# Patient Record
Sex: FEMALE | Race: WHITE | Employment: OTHER | URBAN - METROPOLITAN AREA
[De-identification: names, ages, dates, MRNs, and addresses within clinical notes are randomized per-mention and may not be internally consistent; named-entity substitution may affect disease eponyms.]

---

## 2019-01-01 ENCOUNTER — RESULTS ONLY (OUTPATIENT)
Dept: ENDOSCOPY | Facility: CLINIC | Age: 59
End: 2019-01-01

## 2019-01-01 ENCOUNTER — APPOINTMENT (OUTPATIENT)
Dept: INTERVENTIONAL RADIOLOGY/VASCULAR | Facility: CLINIC | Age: 59
End: 2019-01-01
Attending: RADIOLOGY
Payer: COMMERCIAL

## 2019-01-01 ENCOUNTER — APPOINTMENT (OUTPATIENT)
Dept: GENERAL RADIOLOGY | Facility: CLINIC | Age: 59
End: 2019-01-01
Attending: INTERNAL MEDICINE
Payer: COMMERCIAL

## 2019-01-01 ENCOUNTER — APPOINTMENT (OUTPATIENT)
Dept: INTERVENTIONAL RADIOLOGY/VASCULAR | Facility: CLINIC | Age: 59
End: 2019-01-01
Attending: INTERNAL MEDICINE
Payer: COMMERCIAL

## 2019-01-01 ENCOUNTER — TRANSFERRED RECORDS (OUTPATIENT)
Dept: HEALTH INFORMATION MANAGEMENT | Facility: CLINIC | Age: 59
End: 2019-01-01

## 2019-01-01 ENCOUNTER — APPOINTMENT (OUTPATIENT)
Dept: SPEECH THERAPY | Facility: CLINIC | Age: 59
End: 2019-01-01
Attending: INTERNAL MEDICINE
Payer: COMMERCIAL

## 2019-01-01 ENCOUNTER — APPOINTMENT (OUTPATIENT)
Dept: OCCUPATIONAL THERAPY | Facility: CLINIC | Age: 59
End: 2019-01-01
Attending: INTERNAL MEDICINE
Payer: COMMERCIAL

## 2019-01-01 ENCOUNTER — APPOINTMENT (OUTPATIENT)
Dept: GENERAL RADIOLOGY | Facility: CLINIC | Age: 59
End: 2019-01-01
Attending: EMERGENCY MEDICINE
Payer: COMMERCIAL

## 2019-01-01 ENCOUNTER — APPOINTMENT (OUTPATIENT)
Dept: GENERAL RADIOLOGY | Facility: CLINIC | Age: 59
End: 2019-01-01
Attending: HOSPITALIST
Payer: COMMERCIAL

## 2019-01-01 ENCOUNTER — HOSPITAL ENCOUNTER (INPATIENT)
Facility: CLINIC | Age: 59
LOS: 20 days | End: 2020-01-09
Attending: INTERNAL MEDICINE | Admitting: INTERNAL MEDICINE
Payer: COMMERCIAL

## 2019-01-01 ENCOUNTER — APPOINTMENT (OUTPATIENT)
Dept: CARDIOLOGY | Facility: CLINIC | Age: 59
End: 2019-01-01
Attending: INTERNAL MEDICINE
Payer: COMMERCIAL

## 2019-01-01 ENCOUNTER — ANESTHESIA (OUTPATIENT)
Dept: INTENSIVE CARE | Facility: CLINIC | Age: 59
End: 2019-01-01
Payer: COMMERCIAL

## 2019-01-01 ENCOUNTER — ANESTHESIA EVENT (OUTPATIENT)
Dept: INTENSIVE CARE | Facility: CLINIC | Age: 59
End: 2019-01-01
Payer: COMMERCIAL

## 2019-01-01 ENCOUNTER — HOSPITAL ENCOUNTER (INPATIENT)
Facility: CLINIC | Age: 59
LOS: 1 days | Discharge: ANOTHER HOSPITAL | End: 2019-12-20
Attending: EMERGENCY MEDICINE | Admitting: INTERNAL MEDICINE
Payer: COMMERCIAL

## 2019-01-01 ENCOUNTER — APPOINTMENT (OUTPATIENT)
Dept: PHYSICAL THERAPY | Facility: CLINIC | Age: 59
End: 2019-01-01
Attending: INTERNAL MEDICINE
Payer: COMMERCIAL

## 2019-01-01 ENCOUNTER — APPOINTMENT (OUTPATIENT)
Dept: GENERAL RADIOLOGY | Facility: CLINIC | Age: 59
End: 2019-01-01
Attending: SURGERY
Payer: COMMERCIAL

## 2019-01-01 VITALS
TEMPERATURE: 97.5 F | WEIGHT: 91.49 LBS | RESPIRATION RATE: 18 BRPM | DIASTOLIC BLOOD PRESSURE: 90 MMHG | HEART RATE: 126 BPM | OXYGEN SATURATION: 99 % | SYSTOLIC BLOOD PRESSURE: 172 MMHG

## 2019-01-01 DIAGNOSIS — N28.9 RENAL INSUFFICIENCY: ICD-10-CM

## 2019-01-01 DIAGNOSIS — K26.4 GASTROINTESTINAL HEMORRHAGE ASSOCIATED WITH DUODENAL ULCER: ICD-10-CM

## 2019-01-01 DIAGNOSIS — I95.89 OTHER SPECIFIED HYPOTENSION: ICD-10-CM

## 2019-01-01 LAB
ABO + RH BLD: NORMAL
ALBUMIN SERPL-MCNC: 1 G/DL (ref 3.4–5)
ALBUMIN SERPL-MCNC: 1.2 G/DL (ref 3.4–5)
ALBUMIN SERPL-MCNC: 1.8 G/DL (ref 3.4–5)
ALBUMIN SERPL-MCNC: 1.8 G/DL (ref 3.4–5)
ALBUMIN SERPL-MCNC: 2 G/DL (ref 3.4–5)
ALBUMIN SERPL-MCNC: 2.3 G/DL (ref 3.4–5)
ALP SERPL-CCNC: 136 U/L (ref 40–150)
ALP SERPL-CCNC: 42 U/L (ref 40–150)
ALP SERPL-CCNC: 43 U/L (ref 40–150)
ALP SERPL-CCNC: 45 U/L (ref 40–150)
ALP SERPL-CCNC: 62 U/L (ref 40–150)
ALP SERPL-CCNC: 81 U/L (ref 40–150)
ALT SERPL W P-5'-P-CCNC: 12 U/L (ref 0–50)
ALT SERPL W P-5'-P-CCNC: 12 U/L (ref 0–50)
ALT SERPL W P-5'-P-CCNC: 17 U/L (ref 0–50)
ALT SERPL W P-5'-P-CCNC: 22 U/L (ref 0–50)
ALT SERPL W P-5'-P-CCNC: 23 U/L (ref 0–50)
ALT SERPL W P-5'-P-CCNC: 24 U/L (ref 0–50)
ANION GAP SERPL CALCULATED.3IONS-SCNC: 10 MMOL/L (ref 3–14)
ANION GAP SERPL CALCULATED.3IONS-SCNC: 11 MMOL/L (ref 3–14)
ANION GAP SERPL CALCULATED.3IONS-SCNC: 11 MMOL/L (ref 3–14)
ANION GAP SERPL CALCULATED.3IONS-SCNC: 12 MMOL/L (ref 3–14)
ANION GAP SERPL CALCULATED.3IONS-SCNC: 13 MMOL/L (ref 3–14)
ANION GAP SERPL CALCULATED.3IONS-SCNC: 14 MMOL/L (ref 3–14)
ANION GAP SERPL CALCULATED.3IONS-SCNC: 8 MMOL/L (ref 3–14)
ANION GAP SERPL CALCULATED.3IONS-SCNC: 9 MMOL/L (ref 3–14)
APTT PPP: 25 SEC (ref 22–37)
APTT PPP: 36 SEC (ref 22–37)
APTT PPP: <20 SEC (ref 22–37)
AST SERPL W P-5'-P-CCNC: 12 U/L (ref 0–45)
AST SERPL W P-5'-P-CCNC: 18 U/L (ref 0–45)
AST SERPL W P-5'-P-CCNC: 22 U/L (ref 0–45)
AST SERPL W P-5'-P-CCNC: 28 U/L (ref 0–45)
AST SERPL W P-5'-P-CCNC: 33 U/L (ref 0–45)
AST SERPL W P-5'-P-CCNC: 39 U/L (ref 0–45)
BACTERIA SPEC CULT: NO GROWTH
BACTERIA SPEC CULT: NO GROWTH
BASE DEFICIT BLDA-SCNC: 11.2 MMOL/L
BASE DEFICIT BLDA-SCNC: 16.6 MMOL/L
BASE DEFICIT BLDV-SCNC: 12.1 MMOL/L
BASE DEFICIT BLDV-SCNC: 12.2 MMOL/L
BASE DEFICIT BLDV-SCNC: 13 MMOL/L
BASE DEFICIT BLDV-SCNC: 13.3 MMOL/L
BASE DEFICIT BLDV-SCNC: 14.1 MMOL/L
BASE DEFICIT BLDV-SCNC: 16.1 MMOL/L
BASE DEFICIT BLDV-SCNC: 5.4 MMOL/L
BASE DEFICIT BLDV-SCNC: 9.4 MMOL/L
BASOPHILS # BLD AUTO: 0 10E9/L (ref 0–0.2)
BASOPHILS NFR BLD AUTO: 0 %
BASOPHILS NFR BLD AUTO: 0.1 %
BASOPHILS NFR BLD AUTO: 0.1 %
BILIRUB SERPL-MCNC: 0.2 MG/DL (ref 0.2–1.3)
BILIRUB SERPL-MCNC: 0.2 MG/DL (ref 0.2–1.3)
BILIRUB SERPL-MCNC: 0.4 MG/DL (ref 0.2–1.3)
BILIRUB SERPL-MCNC: 0.4 MG/DL (ref 0.2–1.3)
BILIRUB SERPL-MCNC: 0.5 MG/DL (ref 0.2–1.3)
BILIRUB SERPL-MCNC: 0.8 MG/DL (ref 0.2–1.3)
BLD GP AB SCN SERPL QL: NORMAL
BLD PROD TYP BPU: NORMAL
BLD UNIT ID BPU: 0
BLOOD BANK CMNT PATIENT-IMP: NORMAL
BLOOD PRODUCT CODE: NORMAL
BPU ID: NORMAL
BUN SERPL-MCNC: 100 MG/DL (ref 7–30)
BUN SERPL-MCNC: 100 MG/DL (ref 7–30)
BUN SERPL-MCNC: 101 MG/DL (ref 7–30)
BUN SERPL-MCNC: 104 MG/DL (ref 7–30)
BUN SERPL-MCNC: 105 MG/DL (ref 7–30)
BUN SERPL-MCNC: 111 MG/DL (ref 7–30)
BUN SERPL-MCNC: 117 MG/DL (ref 7–30)
BUN SERPL-MCNC: 130 MG/DL (ref 7–30)
BUN SERPL-MCNC: 147 MG/DL (ref 7–30)
BUN SERPL-MCNC: 92 MG/DL (ref 7–30)
BUN SERPL-MCNC: 94 MG/DL (ref 7–30)
BUN SERPL-MCNC: 95 MG/DL (ref 7–30)
BUN SERPL-MCNC: 98 MG/DL (ref 7–30)
BUN SERPL-MCNC: 98 MG/DL (ref 7–30)
BUN SERPL-MCNC: 99 MG/DL (ref 7–30)
CA-I BLD-MCNC: 4.1 MG/DL (ref 4.4–5.2)
CA-I BLD-MCNC: 4.3 MG/DL (ref 4.4–5.2)
CA-I BLD-MCNC: 4.4 MG/DL (ref 4.4–5.2)
CA-I BLD-MCNC: 4.8 MG/DL (ref 4.4–5.2)
CA-I SERPL ISE-MCNC: 3.9 MG/DL (ref 4.4–5.2)
CALCIUM SERPL-MCNC: 6.3 MG/DL (ref 8.5–10.1)
CALCIUM SERPL-MCNC: 6.3 MG/DL (ref 8.5–10.1)
CALCIUM SERPL-MCNC: 6.5 MG/DL (ref 8.5–10.1)
CALCIUM SERPL-MCNC: 6.5 MG/DL (ref 8.5–10.1)
CALCIUM SERPL-MCNC: 6.6 MG/DL (ref 8.5–10.1)
CALCIUM SERPL-MCNC: 6.6 MG/DL (ref 8.5–10.1)
CALCIUM SERPL-MCNC: 6.8 MG/DL (ref 8.5–10.1)
CALCIUM SERPL-MCNC: 6.9 MG/DL (ref 8.5–10.1)
CALCIUM SERPL-MCNC: 7.2 MG/DL (ref 8.5–10.1)
CALCIUM SERPL-MCNC: 7.3 MG/DL (ref 8.5–10.1)
CALCIUM SERPL-MCNC: 7.5 MG/DL (ref 8.5–10.1)
CALCIUM SERPL-MCNC: 8.1 MG/DL (ref 8.5–10.1)
CALCIUM SERPL-MCNC: 8.6 MG/DL (ref 8.5–10.1)
CHLORIDE SERPL-SCNC: 112 MMOL/L (ref 94–109)
CHLORIDE SERPL-SCNC: 112 MMOL/L (ref 94–109)
CHLORIDE SERPL-SCNC: 113 MMOL/L (ref 94–109)
CHLORIDE SERPL-SCNC: 115 MMOL/L (ref 94–109)
CHLORIDE SERPL-SCNC: 116 MMOL/L (ref 94–109)
CHLORIDE SERPL-SCNC: 116 MMOL/L (ref 94–109)
CHLORIDE SERPL-SCNC: 117 MMOL/L (ref 94–109)
CHLORIDE SERPL-SCNC: 120 MMOL/L (ref 94–109)
CHLORIDE SERPL-SCNC: 120 MMOL/L (ref 94–109)
CHLORIDE SERPL-SCNC: 121 MMOL/L (ref 94–109)
CHLORIDE SERPL-SCNC: 121 MMOL/L (ref 94–109)
CHLORIDE SERPL-SCNC: 122 MMOL/L (ref 94–109)
CHLORIDE SERPL-SCNC: 123 MMOL/L (ref 94–109)
CHLORIDE SERPL-SCNC: 123 MMOL/L (ref 94–109)
CHLORIDE SERPL-SCNC: 125 MMOL/L (ref 94–109)
CO2 SERPL-SCNC: 11 MMOL/L (ref 20–32)
CO2 SERPL-SCNC: 12 MMOL/L (ref 20–32)
CO2 SERPL-SCNC: 12 MMOL/L (ref 20–32)
CO2 SERPL-SCNC: 13 MMOL/L (ref 20–32)
CO2 SERPL-SCNC: 14 MMOL/L (ref 20–32)
CO2 SERPL-SCNC: 14 MMOL/L (ref 20–32)
CO2 SERPL-SCNC: 15 MMOL/L (ref 20–32)
CO2 SERPL-SCNC: 15 MMOL/L (ref 20–32)
CO2 SERPL-SCNC: 16 MMOL/L (ref 20–32)
CO2 SERPL-SCNC: 16 MMOL/L (ref 20–32)
CO2 SERPL-SCNC: 17 MMOL/L (ref 20–32)
CO2 SERPL-SCNC: 18 MMOL/L (ref 20–32)
CO2 SERPL-SCNC: 18 MMOL/L (ref 20–32)
CREAT SERPL-MCNC: 2.17 MG/DL (ref 0.52–1.04)
CREAT SERPL-MCNC: 2.26 MG/DL (ref 0.52–1.04)
CREAT SERPL-MCNC: 2.49 MG/DL (ref 0.52–1.04)
CREAT SERPL-MCNC: 2.6 MG/DL (ref 0.52–1.04)
CREAT SERPL-MCNC: 2.62 MG/DL (ref 0.52–1.04)
CREAT SERPL-MCNC: 2.63 MG/DL (ref 0.52–1.04)
CREAT SERPL-MCNC: 2.72 MG/DL (ref 0.52–1.04)
CREAT SERPL-MCNC: 2.77 MG/DL (ref 0.52–1.04)
CREAT SERPL-MCNC: 2.95 MG/DL (ref 0.52–1.04)
CREAT SERPL-MCNC: 2.97 MG/DL (ref 0.52–1.04)
CREAT SERPL-MCNC: 3.1 MG/DL (ref 0.52–1.04)
CREAT SERPL-MCNC: 3.26 MG/DL (ref 0.52–1.04)
CREAT SERPL-MCNC: 3.27 MG/DL (ref 0.52–1.04)
CREAT SERPL-MCNC: 3.29 MG/DL (ref 0.52–1.04)
CREAT SERPL-MCNC: 3.36 MG/DL (ref 0.52–1.04)
CREAT SERPL-MCNC: 3.39 MG/DL (ref 0.52–1.04)
CREAT SERPL-MCNC: 3.52 MG/DL (ref 0.52–1.04)
CREAT UR-MCNC: 35 MG/DL
DIFFERENTIAL METHOD BLD: ABNORMAL
EOSINOPHIL # BLD AUTO: 0 10E9/L (ref 0–0.7)
EOSINOPHIL # BLD AUTO: 0.2 10E9/L (ref 0–0.7)
EOSINOPHIL # BLD AUTO: 0.2 10E9/L (ref 0–0.7)
EOSINOPHIL NFR BLD AUTO: 0 %
EOSINOPHIL NFR BLD AUTO: 1.1 %
EOSINOPHIL NFR BLD AUTO: 1.3 %
ERYTHROCYTE [DISTWIDTH] IN BLOOD BY AUTOMATED COUNT: 14.5 % (ref 10–15)
ERYTHROCYTE [DISTWIDTH] IN BLOOD BY AUTOMATED COUNT: 14.5 % (ref 10–15)
ERYTHROCYTE [DISTWIDTH] IN BLOOD BY AUTOMATED COUNT: 14.6 % (ref 10–15)
ERYTHROCYTE [DISTWIDTH] IN BLOOD BY AUTOMATED COUNT: 14.8 % (ref 10–15)
ERYTHROCYTE [DISTWIDTH] IN BLOOD BY AUTOMATED COUNT: 14.9 % (ref 10–15)
ERYTHROCYTE [DISTWIDTH] IN BLOOD BY AUTOMATED COUNT: 15 % (ref 10–15)
ERYTHROCYTE [DISTWIDTH] IN BLOOD BY AUTOMATED COUNT: 15 % (ref 10–15)
ERYTHROCYTE [DISTWIDTH] IN BLOOD BY AUTOMATED COUNT: 15.1 % (ref 10–15)
ERYTHROCYTE [DISTWIDTH] IN BLOOD BY AUTOMATED COUNT: 15.3 % (ref 10–15)
ERYTHROCYTE [DISTWIDTH] IN BLOOD BY AUTOMATED COUNT: 15.4 % (ref 10–15)
ERYTHROCYTE [DISTWIDTH] IN BLOOD BY AUTOMATED COUNT: 15.4 % (ref 10–15)
ERYTHROCYTE [DISTWIDTH] IN BLOOD BY AUTOMATED COUNT: 15.5 % (ref 10–15)
ERYTHROCYTE [DISTWIDTH] IN BLOOD BY AUTOMATED COUNT: 15.6 % (ref 10–15)
ERYTHROCYTE [DISTWIDTH] IN BLOOD BY AUTOMATED COUNT: 15.8 % (ref 10–15)
ERYTHROCYTE [DISTWIDTH] IN BLOOD BY AUTOMATED COUNT: 16.1 % (ref 10–15)
ERYTHROCYTE [DISTWIDTH] IN BLOOD BY AUTOMATED COUNT: 16.3 % (ref 10–15)
ERYTHROCYTE [DISTWIDTH] IN BLOOD BY AUTOMATED COUNT: 17 % (ref 10–15)
ERYTHROCYTE [DISTWIDTH] IN BLOOD BY AUTOMATED COUNT: 20.3 % (ref 10–15)
ETHANOL SERPL-MCNC: <0.01 G/DL
FIBRINOGEN PPP-MCNC: 187 MG/DL (ref 200–420)
FIBRINOGEN PPP-MCNC: 252 MG/DL (ref 200–420)
FIBRINOGEN PPP-MCNC: 297 MG/DL (ref 200–420)
FIBRINOGEN PPP-MCNC: 69 MG/DL (ref 200–420)
FIBRINOGEN PPP-MCNC: 92 MG/DL (ref 200–420)
FRACT EXCRET NA UR+SERPL-RTO: 2 %
GFR SERPL CREATININE-BSD FRML MDRD: 13 ML/MIN/{1.73_M2}
GFR SERPL CREATININE-BSD FRML MDRD: 14 ML/MIN/{1.73_M2}
GFR SERPL CREATININE-BSD FRML MDRD: 14 ML/MIN/{1.73_M2}
GFR SERPL CREATININE-BSD FRML MDRD: 15 ML/MIN/{1.73_M2}
GFR SERPL CREATININE-BSD FRML MDRD: 16 ML/MIN/{1.73_M2}
GFR SERPL CREATININE-BSD FRML MDRD: 16 ML/MIN/{1.73_M2}
GFR SERPL CREATININE-BSD FRML MDRD: 17 ML/MIN/{1.73_M2}
GFR SERPL CREATININE-BSD FRML MDRD: 18 ML/MIN/{1.73_M2}
GFR SERPL CREATININE-BSD FRML MDRD: 18 ML/MIN/{1.73_M2}
GFR SERPL CREATININE-BSD FRML MDRD: 19 ML/MIN/{1.73_M2}
GFR SERPL CREATININE-BSD FRML MDRD: 20 ML/MIN/{1.73_M2}
GFR SERPL CREATININE-BSD FRML MDRD: 23 ML/MIN/{1.73_M2}
GFR SERPL CREATININE-BSD FRML MDRD: 24 ML/MIN/{1.73_M2}
GLUCOSE BLDC GLUCOMTR-MCNC: 100 MG/DL (ref 70–99)
GLUCOSE BLDC GLUCOMTR-MCNC: 101 MG/DL (ref 70–99)
GLUCOSE BLDC GLUCOMTR-MCNC: 102 MG/DL (ref 70–99)
GLUCOSE BLDC GLUCOMTR-MCNC: 102 MG/DL (ref 70–99)
GLUCOSE BLDC GLUCOMTR-MCNC: 104 MG/DL (ref 70–99)
GLUCOSE BLDC GLUCOMTR-MCNC: 106 MG/DL (ref 70–99)
GLUCOSE BLDC GLUCOMTR-MCNC: 107 MG/DL (ref 70–99)
GLUCOSE BLDC GLUCOMTR-MCNC: 107 MG/DL (ref 70–99)
GLUCOSE BLDC GLUCOMTR-MCNC: 110 MG/DL (ref 70–99)
GLUCOSE BLDC GLUCOMTR-MCNC: 111 MG/DL (ref 70–99)
GLUCOSE BLDC GLUCOMTR-MCNC: 113 MG/DL (ref 70–99)
GLUCOSE BLDC GLUCOMTR-MCNC: 114 MG/DL (ref 70–99)
GLUCOSE BLDC GLUCOMTR-MCNC: 122 MG/DL (ref 70–99)
GLUCOSE BLDC GLUCOMTR-MCNC: 133 MG/DL (ref 70–99)
GLUCOSE BLDC GLUCOMTR-MCNC: 134 MG/DL (ref 70–99)
GLUCOSE BLDC GLUCOMTR-MCNC: 138 MG/DL (ref 70–99)
GLUCOSE BLDC GLUCOMTR-MCNC: 145 MG/DL (ref 70–99)
GLUCOSE BLDC GLUCOMTR-MCNC: 153 MG/DL (ref 70–99)
GLUCOSE BLDC GLUCOMTR-MCNC: 62 MG/DL (ref 70–99)
GLUCOSE BLDC GLUCOMTR-MCNC: 73 MG/DL (ref 70–99)
GLUCOSE BLDC GLUCOMTR-MCNC: 74 MG/DL (ref 70–99)
GLUCOSE BLDC GLUCOMTR-MCNC: 74 MG/DL (ref 70–99)
GLUCOSE BLDC GLUCOMTR-MCNC: 76 MG/DL (ref 70–99)
GLUCOSE BLDC GLUCOMTR-MCNC: 78 MG/DL (ref 70–99)
GLUCOSE BLDC GLUCOMTR-MCNC: 80 MG/DL (ref 70–99)
GLUCOSE BLDC GLUCOMTR-MCNC: 81 MG/DL (ref 70–99)
GLUCOSE BLDC GLUCOMTR-MCNC: 81 MG/DL (ref 70–99)
GLUCOSE BLDC GLUCOMTR-MCNC: 83 MG/DL (ref 70–99)
GLUCOSE BLDC GLUCOMTR-MCNC: 86 MG/DL (ref 70–99)
GLUCOSE BLDC GLUCOMTR-MCNC: 88 MG/DL (ref 70–99)
GLUCOSE BLDC GLUCOMTR-MCNC: 89 MG/DL (ref 70–99)
GLUCOSE BLDC GLUCOMTR-MCNC: 90 MG/DL (ref 70–99)
GLUCOSE BLDC GLUCOMTR-MCNC: 91 MG/DL (ref 70–99)
GLUCOSE BLDC GLUCOMTR-MCNC: 92 MG/DL (ref 70–99)
GLUCOSE BLDC GLUCOMTR-MCNC: 93 MG/DL (ref 70–99)
GLUCOSE BLDC GLUCOMTR-MCNC: 94 MG/DL (ref 70–99)
GLUCOSE BLDC GLUCOMTR-MCNC: 95 MG/DL (ref 70–99)
GLUCOSE BLDC GLUCOMTR-MCNC: 96 MG/DL (ref 70–99)
GLUCOSE BLDC GLUCOMTR-MCNC: 98 MG/DL (ref 70–99)
GLUCOSE BLDC GLUCOMTR-MCNC: 98 MG/DL (ref 70–99)
GLUCOSE BLDC GLUCOMTR-MCNC: 99 MG/DL (ref 70–99)
GLUCOSE SERPL-MCNC: 105 MG/DL (ref 70–99)
GLUCOSE SERPL-MCNC: 106 MG/DL (ref 70–99)
GLUCOSE SERPL-MCNC: 106 MG/DL (ref 70–99)
GLUCOSE SERPL-MCNC: 107 MG/DL (ref 70–99)
GLUCOSE SERPL-MCNC: 108 MG/DL (ref 70–99)
GLUCOSE SERPL-MCNC: 111 MG/DL (ref 70–99)
GLUCOSE SERPL-MCNC: 112 MG/DL (ref 70–99)
GLUCOSE SERPL-MCNC: 114 MG/DL (ref 70–99)
GLUCOSE SERPL-MCNC: 122 MG/DL (ref 70–99)
GLUCOSE SERPL-MCNC: 172 MG/DL (ref 70–99)
GLUCOSE SERPL-MCNC: 216 MG/DL (ref 70–99)
GLUCOSE SERPL-MCNC: 253 MG/DL (ref 70–99)
GLUCOSE SERPL-MCNC: 83 MG/DL (ref 70–99)
GLUCOSE SERPL-MCNC: 90 MG/DL (ref 70–99)
GLUCOSE SERPL-MCNC: 91 MG/DL (ref 70–99)
GLUCOSE SERPL-MCNC: 99 MG/DL (ref 70–99)
GLUCOSE SERPL-MCNC: 99 MG/DL (ref 70–99)
H PYLORI AG STL QL IA: NEGATIVE
HBA1C MFR BLD: 5.3 % (ref 0–5.6)
HCO3 BLD-SCNC: 10 MMOL/L (ref 21–28)
HCO3 BLD-SCNC: 13 MMOL/L (ref 21–28)
HCO3 BLDV-SCNC: 12 MMOL/L (ref 21–28)
HCO3 BLDV-SCNC: 13 MMOL/L (ref 21–28)
HCO3 BLDV-SCNC: 13 MMOL/L (ref 21–28)
HCO3 BLDV-SCNC: 14 MMOL/L (ref 21–28)
HCO3 BLDV-SCNC: 16 MMOL/L (ref 21–28)
HCO3 BLDV-SCNC: 20 MMOL/L (ref 21–28)
HCT VFR BLD AUTO: 16.8 % (ref 35–47)
HCT VFR BLD AUTO: 19.8 % (ref 35–47)
HCT VFR BLD AUTO: 20.5 % (ref 35–47)
HCT VFR BLD AUTO: 20.8 % (ref 35–47)
HCT VFR BLD AUTO: 21.1 % (ref 35–47)
HCT VFR BLD AUTO: 21.3 % (ref 35–47)
HCT VFR BLD AUTO: 21.5 % (ref 35–47)
HCT VFR BLD AUTO: 24.8 % (ref 35–47)
HCT VFR BLD AUTO: 26.5 % (ref 35–47)
HCT VFR BLD AUTO: 28.1 % (ref 35–47)
HCT VFR BLD AUTO: 28.6 % (ref 35–47)
HCT VFR BLD AUTO: 28.9 % (ref 35–47)
HCT VFR BLD AUTO: 29.3 % (ref 35–47)
HCT VFR BLD AUTO: 29.7 % (ref 35–47)
HCT VFR BLD AUTO: 29.8 % (ref 35–47)
HCT VFR BLD AUTO: 30.2 % (ref 35–47)
HCT VFR BLD AUTO: 30.5 % (ref 35–47)
HCT VFR BLD AUTO: 30.7 % (ref 35–47)
HCT VFR BLD AUTO: 31.5 % (ref 35–47)
HCT VFR BLD AUTO: 31.7 % (ref 35–47)
HCT VFR BLD AUTO: 33.3 % (ref 35–47)
HCT VFR BLD AUTO: 34.1 % (ref 35–47)
HCT VFR BLD AUTO: 36.1 % (ref 35–47)
HCT VFR BLD AUTO: 39.1 % (ref 35–47)
HGB BLD-MCNC: 10 G/DL (ref 11.7–15.7)
HGB BLD-MCNC: 10.1 G/DL (ref 11.7–15.7)
HGB BLD-MCNC: 10.2 G/DL (ref 11.7–15.7)
HGB BLD-MCNC: 10.3 G/DL (ref 11.7–15.7)
HGB BLD-MCNC: 10.3 G/DL (ref 11.7–15.7)
HGB BLD-MCNC: 10.4 G/DL (ref 11.7–15.7)
HGB BLD-MCNC: 10.5 G/DL (ref 11.7–15.7)
HGB BLD-MCNC: 10.5 G/DL (ref 11.7–15.7)
HGB BLD-MCNC: 10.6 G/DL (ref 11.7–15.7)
HGB BLD-MCNC: 10.6 G/DL (ref 11.7–15.7)
HGB BLD-MCNC: 10.7 G/DL (ref 11.7–15.7)
HGB BLD-MCNC: 10.7 G/DL (ref 11.7–15.7)
HGB BLD-MCNC: 11.1 G/DL (ref 11.7–15.7)
HGB BLD-MCNC: 11.8 G/DL (ref 11.7–15.7)
HGB BLD-MCNC: 12.7 G/DL (ref 11.7–15.7)
HGB BLD-MCNC: 13.5 G/DL (ref 11.7–15.7)
HGB BLD-MCNC: 13.5 G/DL (ref 11.7–15.7)
HGB BLD-MCNC: 5 G/DL (ref 11.7–15.7)
HGB BLD-MCNC: 6.8 G/DL (ref 11.7–15.7)
HGB BLD-MCNC: 7 G/DL (ref 11.7–15.7)
HGB BLD-MCNC: 7.2 G/DL (ref 11.7–15.7)
HGB BLD-MCNC: 7.3 G/DL (ref 11.7–15.7)
HGB BLD-MCNC: 7.4 G/DL (ref 11.7–15.7)
HGB BLD-MCNC: 7.5 G/DL (ref 11.7–15.7)
HGB BLD-MCNC: 8 G/DL (ref 11.7–15.7)
HGB BLD-MCNC: 8.1 G/DL (ref 11.7–15.7)
HGB BLD-MCNC: 8.4 G/DL (ref 11.7–15.7)
HGB BLD-MCNC: 8.4 G/DL (ref 11.7–15.7)
HGB BLD-MCNC: 8.8 G/DL (ref 11.7–15.7)
HGB BLD-MCNC: 8.9 G/DL (ref 11.7–15.7)
HGB BLD-MCNC: 9.2 G/DL (ref 11.7–15.7)
HGB BLD-MCNC: 9.3 G/DL (ref 11.7–15.7)
HGB BLD-MCNC: 9.4 G/DL (ref 11.7–15.7)
HGB BLD-MCNC: 9.5 G/DL (ref 11.7–15.7)
HGB BLD-MCNC: 9.8 G/DL (ref 11.7–15.7)
IMM GRANULOCYTES # BLD: 0 10E9/L (ref 0–0.4)
IMM GRANULOCYTES # BLD: 0.1 10E9/L (ref 0–0.4)
IMM GRANULOCYTES # BLD: 0.1 10E9/L (ref 0–0.4)
IMM GRANULOCYTES NFR BLD: 0.3 %
IMM GRANULOCYTES NFR BLD: 0.6 %
IMM GRANULOCYTES NFR BLD: 0.8 %
INR PPP: 1.26 (ref 0.86–1.14)
INR PPP: 1.26 (ref 0.86–1.14)
INR PPP: 1.28 (ref 0.86–1.14)
INR PPP: 1.3 (ref 0.86–1.14)
INR PPP: 1.31 (ref 0.86–1.14)
INR PPP: 1.32 (ref 0.86–1.14)
INR PPP: 1.34 (ref 0.86–1.14)
INR PPP: 1.36 (ref 0.86–1.14)
INR PPP: 1.67 (ref 0.86–1.14)
INR PPP: 1.74 (ref 0.86–1.14)
INTERPRETATION ECG - MUSE: NORMAL
INTERPRETATION ECG - MUSE: NORMAL
LACTATE BLD-SCNC: 0.7 MMOL/L (ref 0.7–2)
LACTATE BLD-SCNC: 0.9 MMOL/L (ref 0.7–2)
LACTATE BLD-SCNC: 1 MMOL/L (ref 0.7–2)
LACTATE BLD-SCNC: 1.1 MMOL/L (ref 0.7–2)
LACTATE BLD-SCNC: 1.3 MMOL/L (ref 0.7–2)
LACTATE BLD-SCNC: 2.1 MMOL/L (ref 0.7–2)
LACTATE BLD-SCNC: 4.2 MMOL/L (ref 0.7–2)
LACTATE SERPL-SCNC: 0.5 MMOL/L (ref 0.4–2)
LACTATE SERPL-SCNC: 0.6 MMOL/L (ref 0.4–2)
LACTATE SERPL-SCNC: NORMAL MMOL/L (ref 0.4–2)
LIPASE SERPL-CCNC: 209 U/L (ref 73–393)
LYMPHOCYTES # BLD AUTO: 1.2 10E9/L (ref 0.8–5.3)
LYMPHOCYTES # BLD AUTO: 1.3 10E9/L (ref 0.8–5.3)
LYMPHOCYTES # BLD AUTO: 1.9 10E9/L (ref 0.8–5.3)
LYMPHOCYTES NFR BLD AUTO: 20.5 %
LYMPHOCYTES NFR BLD AUTO: 8.3 %
LYMPHOCYTES NFR BLD AUTO: 8.8 %
Lab: NORMAL
Lab: NORMAL
MAGNESIUM SERPL-MCNC: 1.4 MG/DL (ref 1.6–2.3)
MAGNESIUM SERPL-MCNC: 1.4 MG/DL (ref 1.6–2.3)
MAGNESIUM SERPL-MCNC: 1.7 MG/DL (ref 1.6–2.3)
MAGNESIUM SERPL-MCNC: 1.7 MG/DL (ref 1.6–2.3)
MAGNESIUM SERPL-MCNC: 1.9 MG/DL (ref 1.6–2.3)
MAGNESIUM SERPL-MCNC: 2 MG/DL (ref 1.6–2.3)
MCH RBC QN AUTO: 19.8 PG (ref 26.5–33)
MCH RBC QN AUTO: 28.9 PG (ref 26.5–33)
MCH RBC QN AUTO: 28.9 PG (ref 26.5–33)
MCH RBC QN AUTO: 29 PG (ref 26.5–33)
MCH RBC QN AUTO: 29.3 PG (ref 26.5–33)
MCH RBC QN AUTO: 29.4 PG (ref 26.5–33)
MCH RBC QN AUTO: 29.6 PG (ref 26.5–33)
MCH RBC QN AUTO: 29.6 PG (ref 26.5–33)
MCH RBC QN AUTO: 29.8 PG (ref 26.5–33)
MCH RBC QN AUTO: 29.8 PG (ref 26.5–33)
MCH RBC QN AUTO: 30 PG (ref 26.5–33)
MCH RBC QN AUTO: 30.1 PG (ref 26.5–33)
MCH RBC QN AUTO: 30.2 PG (ref 26.5–33)
MCH RBC QN AUTO: 30.2 PG (ref 26.5–33)
MCH RBC QN AUTO: 30.3 PG (ref 26.5–33)
MCH RBC QN AUTO: 30.3 PG (ref 26.5–33)
MCH RBC QN AUTO: 30.4 PG (ref 26.5–33)
MCH RBC QN AUTO: 30.5 PG (ref 26.5–33)
MCH RBC QN AUTO: 30.7 PG (ref 26.5–33)
MCH RBC QN AUTO: 30.7 PG (ref 26.5–33)
MCH RBC QN AUTO: 30.8 PG (ref 26.5–33)
MCHC RBC AUTO-ENTMCNC: 29.8 G/DL (ref 31.5–36.5)
MCHC RBC AUTO-ENTMCNC: 32.7 G/DL (ref 31.5–36.5)
MCHC RBC AUTO-ENTMCNC: 32.8 G/DL (ref 31.5–36.5)
MCHC RBC AUTO-ENTMCNC: 32.9 G/DL (ref 31.5–36.5)
MCHC RBC AUTO-ENTMCNC: 33.3 G/DL (ref 31.5–36.5)
MCHC RBC AUTO-ENTMCNC: 33.5 G/DL (ref 31.5–36.5)
MCHC RBC AUTO-ENTMCNC: 33.6 G/DL (ref 31.5–36.5)
MCHC RBC AUTO-ENTMCNC: 33.7 G/DL (ref 31.5–36.5)
MCHC RBC AUTO-ENTMCNC: 33.9 G/DL (ref 31.5–36.5)
MCHC RBC AUTO-ENTMCNC: 34 G/DL (ref 31.5–36.5)
MCHC RBC AUTO-ENTMCNC: 34.1 G/DL (ref 31.5–36.5)
MCHC RBC AUTO-ENTMCNC: 34.1 G/DL (ref 31.5–36.5)
MCHC RBC AUTO-ENTMCNC: 34.3 G/DL (ref 31.5–36.5)
MCHC RBC AUTO-ENTMCNC: 34.4 G/DL (ref 31.5–36.5)
MCHC RBC AUTO-ENTMCNC: 34.5 G/DL (ref 31.5–36.5)
MCHC RBC AUTO-ENTMCNC: 34.5 G/DL (ref 31.5–36.5)
MCHC RBC AUTO-ENTMCNC: 34.6 G/DL (ref 31.5–36.5)
MCHC RBC AUTO-ENTMCNC: 34.6 G/DL (ref 31.5–36.5)
MCHC RBC AUTO-ENTMCNC: 34.7 G/DL (ref 31.5–36.5)
MCHC RBC AUTO-ENTMCNC: 34.8 G/DL (ref 31.5–36.5)
MCHC RBC AUTO-ENTMCNC: 34.9 G/DL (ref 31.5–36.5)
MCHC RBC AUTO-ENTMCNC: 34.9 G/DL (ref 31.5–36.5)
MCHC RBC AUTO-ENTMCNC: 35.1 G/DL (ref 31.5–36.5)
MCHC RBC AUTO-ENTMCNC: 35.2 G/DL (ref 31.5–36.5)
MCHC RBC AUTO-ENTMCNC: 35.2 G/DL (ref 31.5–36.5)
MCHC RBC AUTO-ENTMCNC: 35.4 G/DL (ref 31.5–36.5)
MCV RBC AUTO: 66 FL (ref 78–100)
MCV RBC AUTO: 85 FL (ref 78–100)
MCV RBC AUTO: 86 FL (ref 78–100)
MCV RBC AUTO: 87 FL (ref 78–100)
MCV RBC AUTO: 88 FL (ref 78–100)
MCV RBC AUTO: 89 FL (ref 78–100)
MCV RBC AUTO: 92 FL (ref 78–100)
MONOCYTES # BLD AUTO: 0.4 10E9/L (ref 0–1.3)
MONOCYTES # BLD AUTO: 1.1 10E9/L (ref 0–1.3)
MONOCYTES # BLD AUTO: 1.4 10E9/L (ref 0–1.3)
MONOCYTES NFR BLD AUTO: 4.8 %
MONOCYTES NFR BLD AUTO: 7.2 %
MONOCYTES NFR BLD AUTO: 9.4 %
MRSA DNA SPEC QL NAA+PROBE: NEGATIVE
MRSA DNA SPEC QL NAA+PROBE: NEGATIVE
NEUTROPHILS # BLD AUTO: 11.9 10E9/L (ref 1.6–8.3)
NEUTROPHILS # BLD AUTO: 12.3 10E9/L (ref 1.6–8.3)
NEUTROPHILS # BLD AUTO: 6.7 10E9/L (ref 1.6–8.3)
NEUTROPHILS NFR BLD AUTO: 72.4 %
NEUTROPHILS NFR BLD AUTO: 80 %
NEUTROPHILS NFR BLD AUTO: 82.3 %
NRBC # BLD AUTO: 0 10*3/UL
NRBC # BLD AUTO: 0 10*3/UL
NRBC # BLD AUTO: 0.1 10*3/UL
NRBC BLD AUTO-RTO: 0 /100
NRBC BLD AUTO-RTO: 0 /100
NRBC BLD AUTO-RTO: 2 /100
NUM BPU REQUESTED: 1
NUM BPU REQUESTED: 1
NUM BPU REQUESTED: 12
NUM BPU REQUESTED: 2
NUM BPU REQUESTED: 4
NUM BPU REQUESTED: 4
NUM BPU REQUESTED: 8
O2/TOTAL GAS SETTING VFR VENT: 30 %
O2/TOTAL GAS SETTING VFR VENT: ABNORMAL %
OXYHGB MFR BLD: 95 % (ref 92–100)
OXYHGB MFR BLD: 99 % (ref 92–100)
OXYHGB MFR BLDV: 52 %
OXYHGB MFR BLDV: 62 %
OXYHGB MFR BLDV: 67 %
OXYHGB MFR BLDV: 71 %
OXYHGB MFR BLDV: 71 %
OXYHGB MFR BLDV: 73 %
OXYHGB MFR BLDV: 73 %
OXYHGB MFR BLDV: 79 %
PCO2 BLD: 24 MM HG (ref 35–45)
PCO2 BLD: 24 MM HG (ref 35–45)
PCO2 BLDV: 26 MM HG (ref 40–50)
PCO2 BLDV: 27 MM HG (ref 40–50)
PCO2 BLDV: 28 MM HG (ref 40–50)
PCO2 BLDV: 30 MM HG (ref 40–50)
PCO2 BLDV: 31 MM HG (ref 40–50)
PCO2 BLDV: 33 MM HG (ref 40–50)
PCO2 BLDV: 35 MM HG (ref 40–50)
PCO2 BLDV: 42 MM HG (ref 40–50)
PH BLD: 7.21 PH (ref 7.35–7.45)
PH BLD: 7.34 PH (ref 7.35–7.45)
PH BLDV: 7.1 PH (ref 7.32–7.43)
PH BLDV: 7.22 PH (ref 7.32–7.43)
PH BLDV: 7.26 PH (ref 7.32–7.43)
PH BLDV: 7.27 PH (ref 7.32–7.43)
PH BLDV: 7.28 PH (ref 7.32–7.43)
PH BLDV: 7.3 PH (ref 7.32–7.43)
PH BLDV: 7.3 PH (ref 7.32–7.43)
PH BLDV: 7.35 PH (ref 7.32–7.43)
PHOSPHATE SERPL-MCNC: 4 MG/DL (ref 2.5–4.5)
PHOSPHATE SERPL-MCNC: 4 MG/DL (ref 2.5–4.5)
PHOSPHATE SERPL-MCNC: 4.8 MG/DL (ref 2.5–4.5)
PHOSPHATE SERPL-MCNC: 5.8 MG/DL (ref 2.5–4.5)
PHOSPHATE SERPL-MCNC: 5.8 MG/DL (ref 2.5–4.5)
PHOSPHATE SERPL-MCNC: 6 MG/DL (ref 2.5–4.5)
PHOSPHATE SERPL-MCNC: 7.8 MG/DL (ref 2.5–4.5)
PLATELET # BLD AUTO: 110 10E9/L (ref 150–450)
PLATELET # BLD AUTO: 117 10E9/L (ref 150–450)
PLATELET # BLD AUTO: 118 10E9/L (ref 150–450)
PLATELET # BLD AUTO: 119 10E9/L (ref 150–450)
PLATELET # BLD AUTO: 120 10E9/L (ref 150–450)
PLATELET # BLD AUTO: 123 10E9/L (ref 150–450)
PLATELET # BLD AUTO: 123 10E9/L (ref 150–450)
PLATELET # BLD AUTO: 124 10E9/L (ref 150–450)
PLATELET # BLD AUTO: 143 10E9/L (ref 150–450)
PLATELET # BLD AUTO: 144 10E9/L (ref 150–450)
PLATELET # BLD AUTO: 170 10E9/L (ref 150–450)
PLATELET # BLD AUTO: 171 10E9/L (ref 150–450)
PLATELET # BLD AUTO: 182 10E9/L (ref 150–450)
PLATELET # BLD AUTO: 201 10E9/L (ref 150–450)
PLATELET # BLD AUTO: 202 10E9/L (ref 150–450)
PLATELET # BLD AUTO: 219 10E9/L (ref 150–450)
PLATELET # BLD AUTO: 227 10E9/L (ref 150–450)
PLATELET # BLD AUTO: 283 10E9/L (ref 150–450)
PLATELET # BLD AUTO: 29 10E9/L (ref 150–450)
PLATELET # BLD AUTO: 53 10E9/L (ref 150–450)
PLATELET # BLD AUTO: 98 10E9/L (ref 150–450)
PLATELET # BLD AUTO: 98 10E9/L (ref 150–450)
PO2 BLD: 227 MM HG (ref 80–105)
PO2 BLD: 72 MM HG (ref 80–105)
PO2 BLDV: 26 MM HG (ref 25–47)
PO2 BLDV: 32 MM HG (ref 25–47)
PO2 BLDV: 36 MM HG (ref 25–47)
PO2 BLDV: 37 MM HG (ref 25–47)
PO2 BLDV: 39 MM HG (ref 25–47)
PO2 BLDV: 39 MM HG (ref 25–47)
PO2 BLDV: 41 MM HG (ref 25–47)
PO2 BLDV: 52 MM HG (ref 25–47)
POTASSIUM SERPL-SCNC: 3.2 MMOL/L (ref 3.4–5.3)
POTASSIUM SERPL-SCNC: 3.3 MMOL/L (ref 3.4–5.3)
POTASSIUM SERPL-SCNC: 3.4 MMOL/L (ref 3.4–5.3)
POTASSIUM SERPL-SCNC: 3.6 MMOL/L (ref 3.4–5.3)
POTASSIUM SERPL-SCNC: 3.8 MMOL/L (ref 3.4–5.3)
POTASSIUM SERPL-SCNC: 3.9 MMOL/L (ref 3.4–5.3)
POTASSIUM SERPL-SCNC: 4 MMOL/L (ref 3.4–5.3)
POTASSIUM SERPL-SCNC: 4.3 MMOL/L (ref 3.4–5.3)
POTASSIUM SERPL-SCNC: 4.4 MMOL/L (ref 3.4–5.3)
POTASSIUM SERPL-SCNC: 4.5 MMOL/L (ref 3.4–5.3)
POTASSIUM SERPL-SCNC: 4.6 MMOL/L (ref 3.4–5.3)
POTASSIUM SERPL-SCNC: 4.6 MMOL/L (ref 3.4–5.3)
POTASSIUM SERPL-SCNC: 4.7 MMOL/L (ref 3.4–5.3)
POTASSIUM SERPL-SCNC: 4.9 MMOL/L (ref 3.4–5.3)
POTASSIUM SERPL-SCNC: 5.2 MMOL/L (ref 3.4–5.3)
POTASSIUM SERPL-SCNC: 5.3 MMOL/L (ref 3.4–5.3)
POTASSIUM SERPL-SCNC: 5.4 MMOL/L (ref 3.4–5.3)
PROCALCITONIN SERPL-MCNC: 1.58 NG/ML
PROCALCITONIN SERPL-MCNC: 1.66 NG/ML
PROT SERPL-MCNC: 3.2 G/DL (ref 6.8–8.8)
PROT SERPL-MCNC: 3.4 G/DL (ref 6.8–8.8)
PROT SERPL-MCNC: 3.9 G/DL (ref 6.8–8.8)
PROT SERPL-MCNC: 4.3 G/DL (ref 6.8–8.8)
PROT SERPL-MCNC: 4.5 G/DL (ref 6.8–8.8)
PROT SERPL-MCNC: 5.2 G/DL (ref 6.8–8.8)
RBC # BLD AUTO: 2.23 10E12/L (ref 3.8–5.2)
RBC # BLD AUTO: 2.3 10E12/L (ref 3.8–5.2)
RBC # BLD AUTO: 2.37 10E12/L (ref 3.8–5.2)
RBC # BLD AUTO: 2.41 10E12/L (ref 3.8–5.2)
RBC # BLD AUTO: 2.42 10E12/L (ref 3.8–5.2)
RBC # BLD AUTO: 2.46 10E12/L (ref 3.8–5.2)
RBC # BLD AUTO: 2.46 10E12/L (ref 3.8–5.2)
RBC # BLD AUTO: 2.53 10E12/L (ref 3.8–5.2)
RBC # BLD AUTO: 2.91 10E12/L (ref 3.8–5.2)
RBC # BLD AUTO: 2.97 10E12/L (ref 3.8–5.2)
RBC # BLD AUTO: 3.14 10E12/L (ref 3.8–5.2)
RBC # BLD AUTO: 3.18 10E12/L (ref 3.8–5.2)
RBC # BLD AUTO: 3.42 10E12/L (ref 3.8–5.2)
RBC # BLD AUTO: 3.46 10E12/L (ref 3.8–5.2)
RBC # BLD AUTO: 3.47 10E12/L (ref 3.8–5.2)
RBC # BLD AUTO: 3.48 10E12/L (ref 3.8–5.2)
RBC # BLD AUTO: 3.49 10E12/L (ref 3.8–5.2)
RBC # BLD AUTO: 3.56 10E12/L (ref 3.8–5.2)
RBC # BLD AUTO: 3.58 10E12/L (ref 3.8–5.2)
RBC # BLD AUTO: 3.59 10E12/L (ref 3.8–5.2)
RBC # BLD AUTO: 3.78 10E12/L (ref 3.8–5.2)
RBC # BLD AUTO: 3.91 10E12/L (ref 3.8–5.2)
RBC # BLD AUTO: 4.17 10E12/L (ref 3.8–5.2)
RBC # BLD AUTO: 4.42 10E12/L (ref 3.8–5.2)
SODIUM SERPL-SCNC: 136 MMOL/L (ref 133–144)
SODIUM SERPL-SCNC: 137 MMOL/L (ref 133–144)
SODIUM SERPL-SCNC: 138 MMOL/L (ref 133–144)
SODIUM SERPL-SCNC: 139 MMOL/L (ref 133–144)
SODIUM SERPL-SCNC: 142 MMOL/L (ref 133–144)
SODIUM SERPL-SCNC: 142 MMOL/L (ref 133–144)
SODIUM SERPL-SCNC: 144 MMOL/L (ref 133–144)
SODIUM SERPL-SCNC: 146 MMOL/L (ref 133–144)
SODIUM SERPL-SCNC: 147 MMOL/L (ref 133–144)
SODIUM SERPL-SCNC: 148 MMOL/L (ref 133–144)
SODIUM SERPL-SCNC: 149 MMOL/L (ref 133–144)
SODIUM SERPL-SCNC: 151 MMOL/L (ref 133–144)
SODIUM SERPL-SCNC: 151 MMOL/L (ref 133–144)
SODIUM UR-SCNC: 29 MMOL/L
SPECIMEN EXP DATE BLD: NORMAL
SPECIMEN SOURCE: NORMAL
TRANSFUSION STATUS PATIENT QL: NORMAL
TROPONIN I SERPL-MCNC: 0.04 UG/L (ref 0–0.04)
UPPER GI ENDOSCOPY: NORMAL
UPPER GI ENDOSCOPY: NORMAL
WBC # BLD AUTO: 11.3 10E9/L (ref 4–11)
WBC # BLD AUTO: 12.2 10E9/L (ref 4–11)
WBC # BLD AUTO: 14.9 10E9/L (ref 4–11)
WBC # BLD AUTO: 15 10E9/L (ref 4–11)
WBC # BLD AUTO: 15.2 10E9/L (ref 4–11)
WBC # BLD AUTO: 15.4 10E9/L (ref 4–11)
WBC # BLD AUTO: 15.5 10E9/L (ref 4–11)
WBC # BLD AUTO: 16.1 10E9/L (ref 4–11)
WBC # BLD AUTO: 16.1 10E9/L (ref 4–11)
WBC # BLD AUTO: 16.5 10E9/L (ref 4–11)
WBC # BLD AUTO: 17 10E9/L (ref 4–11)
WBC # BLD AUTO: 17.6 10E9/L (ref 4–11)
WBC # BLD AUTO: 17.7 10E9/L (ref 4–11)
WBC # BLD AUTO: 17.7 10E9/L (ref 4–11)
WBC # BLD AUTO: 19.2 10E9/L (ref 4–11)
WBC # BLD AUTO: 20 10E9/L (ref 4–11)
WBC # BLD AUTO: 20 10E9/L (ref 4–11)
WBC # BLD AUTO: 20.5 10E9/L (ref 4–11)
WBC # BLD AUTO: 21.3 10E9/L (ref 4–11)
WBC # BLD AUTO: 21.4 10E9/L (ref 4–11)
WBC # BLD AUTO: 21.5 10E9/L (ref 4–11)
WBC # BLD AUTO: 23.3 10E9/L (ref 4–11)
WBC # BLD AUTO: 23.4 10E9/L (ref 4–11)
WBC # BLD AUTO: 23.7 10E9/L (ref 4–11)
WBC # BLD AUTO: 8.1 10E9/L (ref 4–11)
WBC # BLD AUTO: 9 10E9/L (ref 4–11)

## 2019-01-01 PROCEDURE — 25000128 H RX IP 250 OP 636: Performed by: HOSPITALIST

## 2019-01-01 PROCEDURE — 87040 BLOOD CULTURE FOR BACTERIA: CPT | Performed by: EMERGENCY MEDICINE

## 2019-01-01 PROCEDURE — 85027 COMPLETE CBC AUTOMATED: CPT | Performed by: INTERNAL MEDICINE

## 2019-01-01 PROCEDURE — C9113 INJ PANTOPRAZOLE SODIUM, VIA: HCPCS | Performed by: INTERNAL MEDICINE

## 2019-01-01 PROCEDURE — 25800030 ZZH RX IP 258 OP 636: Performed by: HOSPITALIST

## 2019-01-01 PROCEDURE — 85384 FIBRINOGEN ACTIVITY: CPT | Performed by: INTERNAL MEDICINE

## 2019-01-01 PROCEDURE — 82805 BLOOD GASES W/O2 SATURATION: CPT | Performed by: INTERNAL MEDICINE

## 2019-01-01 PROCEDURE — 3E043XZ INTRODUCTION OF VASOPRESSOR INTO CENTRAL VEIN, PERCUTANEOUS APPROACH: ICD-10-PCS | Performed by: INTERNAL MEDICINE

## 2019-01-01 PROCEDURE — C1769 GUIDE WIRE: HCPCS

## 2019-01-01 PROCEDURE — 86923 COMPATIBILITY TEST ELECTRIC: CPT | Performed by: EMERGENCY MEDICINE

## 2019-01-01 PROCEDURE — 25800030 ZZH RX IP 258 OP 636: Performed by: INTERNAL MEDICINE

## 2019-01-01 PROCEDURE — 84100 ASSAY OF PHOSPHORUS: CPT | Performed by: INTERNAL MEDICINE

## 2019-01-01 PROCEDURE — 25800030 ZZH RX IP 258 OP 636: Performed by: RADIOLOGY

## 2019-01-01 PROCEDURE — 86850 RBC ANTIBODY SCREEN: CPT | Performed by: NURSE PRACTITIONER

## 2019-01-01 PROCEDURE — 96374 THER/PROPH/DIAG INJ IV PUSH: CPT | Mod: 59

## 2019-01-01 PROCEDURE — 20000003 ZZH R&B ICU

## 2019-01-01 PROCEDURE — 85610 PROTHROMBIN TIME: CPT | Performed by: INTERNAL MEDICINE

## 2019-01-01 PROCEDURE — 25000128 H RX IP 250 OP 636: Performed by: INTERNAL MEDICINE

## 2019-01-01 PROCEDURE — 92526 ORAL FUNCTION THERAPY: CPT | Mod: GN | Performed by: SPEECH-LANGUAGE PATHOLOGIST

## 2019-01-01 PROCEDURE — 25000128 H RX IP 250 OP 636

## 2019-01-01 PROCEDURE — 97110 THERAPEUTIC EXERCISES: CPT | Mod: GP | Performed by: PHYSICAL THERAPIST

## 2019-01-01 PROCEDURE — 40000008 ZZH STATISTIC AIRWAY CARE

## 2019-01-01 PROCEDURE — C9113 INJ PANTOPRAZOLE SODIUM, VIA: HCPCS | Performed by: HOSPITALIST

## 2019-01-01 PROCEDURE — 94002 VENT MGMT INPAT INIT DAY: CPT

## 2019-01-01 PROCEDURE — 99233 SBSQ HOSP IP/OBS HIGH 50: CPT | Performed by: INTERNAL MEDICINE

## 2019-01-01 PROCEDURE — 87641 MR-STAPH DNA AMP PROBE: CPT | Performed by: INTERNAL MEDICINE

## 2019-01-01 PROCEDURE — 99291 CRITICAL CARE FIRST HOUR: CPT | Performed by: INTERNAL MEDICINE

## 2019-01-01 PROCEDURE — 27210746 ZZH CATH CR16

## 2019-01-01 PROCEDURE — 00000146 ZZHCL STATISTIC GLUCOSE BY METER IP

## 2019-01-01 PROCEDURE — 93325 DOPPLER ECHO COLOR FLOW MAPG: CPT | Mod: 26 | Performed by: INTERNAL MEDICINE

## 2019-01-01 PROCEDURE — 25000125 ZZHC RX 250: Performed by: INTERNAL MEDICINE

## 2019-01-01 PROCEDURE — 86900 BLOOD TYPING SEROLOGIC ABO: CPT | Performed by: INTERNAL MEDICINE

## 2019-01-01 PROCEDURE — 83605 ASSAY OF LACTIC ACID: CPT | Performed by: INTERNAL MEDICINE

## 2019-01-01 PROCEDURE — 36246 INS CATH ABD/L-EXT ART 2ND: CPT

## 2019-01-01 PROCEDURE — 36430 TRANSFUSION BLD/BLD COMPNT: CPT

## 2019-01-01 PROCEDURE — 40000275 ZZH STATISTIC RCP TIME EA 10 MIN

## 2019-01-01 PROCEDURE — 87070 CULTURE OTHR SPECIMN AEROBIC: CPT | Performed by: INTERNAL MEDICINE

## 2019-01-01 PROCEDURE — 40000344 ZZHCL STATISTIC THAWING COMPONENT: Performed by: INTERNAL MEDICINE

## 2019-01-01 PROCEDURE — 94003 VENT MGMT INPAT SUBQ DAY: CPT

## 2019-01-01 PROCEDURE — 85027 COMPLETE CBC AUTOMATED: CPT | Performed by: HOSPITALIST

## 2019-01-01 PROCEDURE — 85018 HEMOGLOBIN: CPT | Performed by: INTERNAL MEDICINE

## 2019-01-01 PROCEDURE — 97530 THERAPEUTIC ACTIVITIES: CPT | Mod: GP | Performed by: PHYSICAL THERAPIST

## 2019-01-01 PROCEDURE — 75774 ARTERY X-RAY EACH VESSEL: CPT

## 2019-01-01 PROCEDURE — 80048 BASIC METABOLIC PNL TOTAL CA: CPT | Performed by: INTERNAL MEDICINE

## 2019-01-01 PROCEDURE — 3E043XZ INTRODUCTION OF VASOPRESSOR INTO CENTRAL VEIN, PERCUTANEOUS APPROACH: ICD-10-PCS | Performed by: HOSPITALIST

## 2019-01-01 PROCEDURE — 5A1955Z RESPIRATORY VENTILATION, GREATER THAN 96 CONSECUTIVE HOURS: ICD-10-PCS | Performed by: HOSPITALIST

## 2019-01-01 PROCEDURE — 86850 RBC ANTIBODY SCREEN: CPT | Performed by: INTERNAL MEDICINE

## 2019-01-01 PROCEDURE — 40000104 ZZH STATISTIC MODERATE SEDATION < 10 MIN: Performed by: INTERNAL MEDICINE

## 2019-01-01 PROCEDURE — 43235 EGD DIAGNOSTIC BRUSH WASH: CPT | Performed by: INTERNAL MEDICINE

## 2019-01-01 PROCEDURE — 0DJ08ZZ INSPECTION OF UPPER INTESTINAL TRACT, VIA NATURAL OR ARTIFICIAL OPENING ENDOSCOPIC: ICD-10-PCS | Performed by: INTERNAL MEDICINE

## 2019-01-01 PROCEDURE — 93308 TTE F-UP OR LMTD: CPT

## 2019-01-01 PROCEDURE — 36415 COLL VENOUS BLD VENIPUNCTURE: CPT | Performed by: EMERGENCY MEDICINE

## 2019-01-01 PROCEDURE — 82805 BLOOD GASES W/O2 SATURATION: CPT | Performed by: SURGERY

## 2019-01-01 PROCEDURE — 0D968ZZ DRAINAGE OF STOMACH, VIA NATURAL OR ARTIFICIAL OPENING ENDOSCOPIC: ICD-10-PCS | Performed by: INTERNAL MEDICINE

## 2019-01-01 PROCEDURE — 40000986 XR CHEST PORT 1 VW

## 2019-01-01 PROCEDURE — 83690 ASSAY OF LIPASE: CPT | Performed by: EMERGENCY MEDICINE

## 2019-01-01 PROCEDURE — 85610 PROTHROMBIN TIME: CPT | Performed by: SURGERY

## 2019-01-01 PROCEDURE — 86923 COMPATIBILITY TEST ELECTRIC: CPT | Performed by: INTERNAL MEDICINE

## 2019-01-01 PROCEDURE — 27210889 ZZH ACCESSORY CR8

## 2019-01-01 PROCEDURE — 25800025 ZZH RX 258: Performed by: INTERNAL MEDICINE

## 2019-01-01 PROCEDURE — 85730 THROMBOPLASTIN TIME PARTIAL: CPT | Performed by: SURGERY

## 2019-01-01 PROCEDURE — 84132 ASSAY OF SERUM POTASSIUM: CPT | Performed by: INTERNAL MEDICINE

## 2019-01-01 PROCEDURE — 5A1935Z RESPIRATORY VENTILATION, LESS THAN 24 CONSECUTIVE HOURS: ICD-10-PCS | Performed by: HOSPITALIST

## 2019-01-01 PROCEDURE — 99231 SBSQ HOSP IP/OBS SF/LOW 25: CPT | Performed by: SURGERY

## 2019-01-01 PROCEDURE — 84145 PROCALCITONIN (PCT): CPT | Performed by: INTERNAL MEDICINE

## 2019-01-01 PROCEDURE — C1760 CLOSURE DEV, VASC: HCPCS

## 2019-01-01 PROCEDURE — 25000125 ZZHC RX 250: Performed by: SURGERY

## 2019-01-01 PROCEDURE — 83036 HEMOGLOBIN GLYCOSYLATED A1C: CPT | Performed by: INTERNAL MEDICINE

## 2019-01-01 PROCEDURE — 25000128 H RX IP 250 OP 636: Performed by: RADIOLOGY

## 2019-01-01 PROCEDURE — 87040 BLOOD CULTURE FOR BACTERIA: CPT | Performed by: INTERNAL MEDICINE

## 2019-01-01 PROCEDURE — P9059 PLASMA, FRZ BETWEEN 8-24HOUR: HCPCS | Performed by: INTERNAL MEDICINE

## 2019-01-01 PROCEDURE — 99291 CRITICAL CARE FIRST HOUR: CPT | Mod: 25

## 2019-01-01 PROCEDURE — 80048 BASIC METABOLIC PNL TOTAL CA: CPT | Performed by: HOSPITALIST

## 2019-01-01 PROCEDURE — P9037 PLATE PHERES LEUKOREDU IRRAD: HCPCS | Performed by: INTERNAL MEDICINE

## 2019-01-01 PROCEDURE — 99233 SBSQ HOSP IP/OBS HIGH 50: CPT | Performed by: HOSPITALIST

## 2019-01-01 PROCEDURE — 85384 FIBRINOGEN ACTIVITY: CPT | Performed by: SURGERY

## 2019-01-01 PROCEDURE — P9016 RBC LEUKOCYTES REDUCED: HCPCS | Performed by: INTERNAL MEDICINE

## 2019-01-01 PROCEDURE — 85027 COMPLETE CBC AUTOMATED: CPT | Performed by: SURGERY

## 2019-01-01 PROCEDURE — 84100 ASSAY OF PHOSPHORUS: CPT | Performed by: HOSPITALIST

## 2019-01-01 PROCEDURE — 99291 CRITICAL CARE FIRST HOUR: CPT | Mod: 24 | Performed by: INTERNAL MEDICINE

## 2019-01-01 PROCEDURE — 25000131 ZZH RX MED GY IP 250 OP 636 PS 637: Performed by: INTERNAL MEDICINE

## 2019-01-01 PROCEDURE — 83735 ASSAY OF MAGNESIUM: CPT | Performed by: HOSPITALIST

## 2019-01-01 PROCEDURE — C9113 INJ PANTOPRAZOLE SODIUM, VIA: HCPCS | Performed by: EMERGENCY MEDICINE

## 2019-01-01 PROCEDURE — 99221 1ST HOSP IP/OBS SF/LOW 40: CPT | Mod: 24 | Performed by: SURGERY

## 2019-01-01 PROCEDURE — 85610 PROTHROMBIN TIME: CPT | Performed by: NURSE PRACTITIONER

## 2019-01-01 PROCEDURE — 83605 ASSAY OF LACTIC ACID: CPT | Performed by: HOSPITALIST

## 2019-01-01 PROCEDURE — 27210432 ZZH NUTRITION PRODUCT RENAL BASIC LITER

## 2019-01-01 PROCEDURE — 40000256 ZZH STATISTIC CARDIOPULM RESUSCITATION

## 2019-01-01 PROCEDURE — 04L13DZ OCCLUSION OF CELIAC ARTERY WITH INTRALUMINAL DEVICE, PERCUTANEOUS APPROACH: ICD-10-PCS | Performed by: RADIOLOGY

## 2019-01-01 PROCEDURE — 84484 ASSAY OF TROPONIN QUANT: CPT | Performed by: EMERGENCY MEDICINE

## 2019-01-01 PROCEDURE — 85025 COMPLETE CBC W/AUTO DIFF WBC: CPT | Performed by: EMERGENCY MEDICINE

## 2019-01-01 PROCEDURE — 27210886 ZZH ACCESSORY CR5

## 2019-01-01 PROCEDURE — 12000000 ZZH R&B MED SURG/OB

## 2019-01-01 PROCEDURE — 83735 ASSAY OF MAGNESIUM: CPT | Performed by: INTERNAL MEDICINE

## 2019-01-01 PROCEDURE — 25000132 ZZH RX MED GY IP 250 OP 250 PS 637: Performed by: INTERNAL MEDICINE

## 2019-01-01 PROCEDURE — 31500 INSERT EMERGENCY AIRWAY: CPT | Performed by: SURGERY

## 2019-01-01 PROCEDURE — 87077 CULTURE AEROBIC IDENTIFY: CPT | Performed by: INTERNAL MEDICINE

## 2019-01-01 PROCEDURE — 92610 EVALUATE SWALLOWING FUNCTION: CPT | Mod: GN

## 2019-01-01 PROCEDURE — 27210808 ZZH SHEATH CR7

## 2019-01-01 PROCEDURE — 40000671 ZZH STATISTIC ANESTHESIA CASE

## 2019-01-01 PROCEDURE — 85014 HEMATOCRIT: CPT | Performed by: INTERNAL MEDICINE

## 2019-01-01 PROCEDURE — 25800030 ZZH RX IP 258 OP 636: Performed by: EMERGENCY MEDICINE

## 2019-01-01 PROCEDURE — 85027 COMPLETE CBC AUTOMATED: CPT | Performed by: RADIOLOGY

## 2019-01-01 PROCEDURE — 27210738 ZZH ACCESSORY CR2

## 2019-01-01 PROCEDURE — 36248 INS CATH ABD/L-EXT ART ADDL: CPT

## 2019-01-01 PROCEDURE — 86901 BLOOD TYPING SEROLOGIC RH(D): CPT | Performed by: NURSE PRACTITIONER

## 2019-01-01 PROCEDURE — 27211143 ZZHC CATH NEURO CR14

## 2019-01-01 PROCEDURE — 85025 COMPLETE CBC W/AUTO DIFF WBC: CPT | Performed by: INTERNAL MEDICINE

## 2019-01-01 PROCEDURE — 85018 HEMOGLOBIN: CPT | Performed by: HOSPITALIST

## 2019-01-01 PROCEDURE — 86901 BLOOD TYPING SEROLOGIC RH(D): CPT | Performed by: INTERNAL MEDICINE

## 2019-01-01 PROCEDURE — 82330 ASSAY OF CALCIUM: CPT | Performed by: INTERNAL MEDICINE

## 2019-01-01 PROCEDURE — 80053 COMPREHEN METABOLIC PANEL: CPT | Performed by: EMERGENCY MEDICINE

## 2019-01-01 PROCEDURE — 40000809 ZZH STATISTIC NO DOCUMENTATION TO SUPPORT CHARGE

## 2019-01-01 PROCEDURE — 25500064 ZZH RX 255 OP 636: Performed by: HOSPITALIST

## 2019-01-01 PROCEDURE — 40000986 XR ABDOMEN PORT 1 VW

## 2019-01-01 PROCEDURE — 25000125 ZZHC RX 250: Performed by: RADIOLOGY

## 2019-01-01 PROCEDURE — 80053 COMPREHEN METABOLIC PANEL: CPT | Performed by: NURSE PRACTITIONER

## 2019-01-01 PROCEDURE — 5A1935Z RESPIRATORY VENTILATION, LESS THAN 24 CONSECUTIVE HOURS: ICD-10-PCS | Performed by: INTERNAL MEDICINE

## 2019-01-01 PROCEDURE — 25500064 ZZH RX 255 OP 636: Performed by: RADIOLOGY

## 2019-01-01 PROCEDURE — 85730 THROMBOPLASTIN TIME PARTIAL: CPT | Performed by: INTERNAL MEDICINE

## 2019-01-01 PROCEDURE — 99292 CRITICAL CARE ADDL 30 MIN: CPT | Performed by: INTERNAL MEDICINE

## 2019-01-01 PROCEDURE — 87186 SC STD MICRODIL/AGAR DIL: CPT | Performed by: INTERNAL MEDICINE

## 2019-01-01 PROCEDURE — C1773 RET DEV, INSERTABLE: HCPCS

## 2019-01-01 PROCEDURE — 82570 ASSAY OF URINE CREATININE: CPT | Performed by: INTERNAL MEDICINE

## 2019-01-01 PROCEDURE — 75726 ARTERY X-RAYS ABDOMEN: CPT

## 2019-01-01 PROCEDURE — 85610 PROTHROMBIN TIME: CPT | Performed by: EMERGENCY MEDICINE

## 2019-01-01 PROCEDURE — 25000125 ZZHC RX 250

## 2019-01-01 PROCEDURE — 93308 TTE F-UP OR LMTD: CPT | Mod: 26 | Performed by: INTERNAL MEDICINE

## 2019-01-01 PROCEDURE — P9016 RBC LEUKOCYTES REDUCED: HCPCS | Performed by: EMERGENCY MEDICINE

## 2019-01-01 PROCEDURE — 27210804 ZZH SHEATH CR3

## 2019-01-01 PROCEDURE — 96365 THER/PROPH/DIAG IV INF INIT: CPT | Mod: 59

## 2019-01-01 PROCEDURE — 71045 X-RAY EXAM CHEST 1 VIEW: CPT

## 2019-01-01 PROCEDURE — 25000128 H RX IP 250 OP 636: Performed by: EMERGENCY MEDICINE

## 2019-01-01 PROCEDURE — 36247 INS CATH ABD/L-EXT ART 3RD: CPT

## 2019-01-01 PROCEDURE — 97165 OT EVAL LOW COMPLEX 30 MIN: CPT | Mod: GO | Performed by: OCCUPATIONAL THERAPIST

## 2019-01-01 PROCEDURE — 80053 COMPREHEN METABOLIC PANEL: CPT | Performed by: SURGERY

## 2019-01-01 PROCEDURE — 04L53DZ OCCLUSION OF SUPERIOR MESENTERIC ARTERY WITH INTRALUMINAL DEVICE, PERCUTANEOUS APPROACH: ICD-10-PCS | Performed by: RADIOLOGY

## 2019-01-01 PROCEDURE — 86900 BLOOD TYPING SEROLOGIC ABO: CPT | Performed by: NURSE PRACTITIONER

## 2019-01-01 PROCEDURE — P9012 CRYOPRECIPITATE EACH UNIT: HCPCS | Performed by: INTERNAL MEDICINE

## 2019-01-01 PROCEDURE — 36556 INSERT NON-TUNNEL CV CATH: CPT

## 2019-01-01 PROCEDURE — 25800030 ZZH RX IP 258 OP 636: Performed by: NURSE PRACTITIONER

## 2019-01-01 PROCEDURE — 27211078 ZZH GLUE EMBOLI CR28

## 2019-01-01 PROCEDURE — 83605 ASSAY OF LACTIC ACID: CPT | Performed by: EMERGENCY MEDICINE

## 2019-01-01 PROCEDURE — 93321 DOPPLER ECHO F-UP/LMTD STD: CPT | Mod: 26 | Performed by: INTERNAL MEDICINE

## 2019-01-01 PROCEDURE — 87338 HPYLORI STOOL AG IA: CPT | Performed by: INTERNAL MEDICINE

## 2019-01-01 PROCEDURE — 25000132 ZZH RX MED GY IP 250 OP 250 PS 637: Performed by: HOSPITALIST

## 2019-01-01 PROCEDURE — 96361 HYDRATE IV INFUSION ADD-ON: CPT

## 2019-01-01 PROCEDURE — 25000125 ZZHC RX 250: Performed by: HOSPITALIST

## 2019-01-01 PROCEDURE — 85027 COMPLETE CBC AUTOMATED: CPT | Performed by: NURSE PRACTITIONER

## 2019-01-01 PROCEDURE — 40000873 ZZH CANCELLED SURGERY UP TO 15 MINS: Performed by: INTERNAL MEDICINE

## 2019-01-01 PROCEDURE — 80320 DRUG SCREEN QUANTALCOHOLS: CPT | Performed by: EMERGENCY MEDICINE

## 2019-01-01 PROCEDURE — 84300 ASSAY OF URINE SODIUM: CPT | Performed by: INTERNAL MEDICINE

## 2019-01-01 PROCEDURE — 99292 CRITICAL CARE ADDL 30 MIN: CPT | Mod: 24 | Performed by: SURGERY

## 2019-01-01 PROCEDURE — 99291 CRITICAL CARE FIRST HOUR: CPT | Mod: 25 | Performed by: INTERNAL MEDICINE

## 2019-01-01 PROCEDURE — 80048 BASIC METABOLIC PNL TOTAL CA: CPT | Performed by: SURGERY

## 2019-01-01 PROCEDURE — 27210742 ZZH CATH CR1

## 2019-01-01 PROCEDURE — 87640 STAPH A DNA AMP PROBE: CPT | Performed by: INTERNAL MEDICINE

## 2019-01-01 PROCEDURE — 99231 SBSQ HOSP IP/OBS SF/LOW 25: CPT | Mod: 24 | Performed by: SURGERY

## 2019-01-01 PROCEDURE — 25000125 ZZHC RX 250: Performed by: NURSE ANESTHETIST, CERTIFIED REGISTERED

## 2019-01-01 PROCEDURE — 80053 COMPREHEN METABOLIC PANEL: CPT | Performed by: INTERNAL MEDICINE

## 2019-01-01 PROCEDURE — 93005 ELECTROCARDIOGRAM TRACING: CPT | Mod: 76

## 2019-01-01 PROCEDURE — 99291 CRITICAL CARE FIRST HOUR: CPT | Performed by: NURSE PRACTITIONER

## 2019-01-01 PROCEDURE — 27210805 ZZH SHEATH CR4

## 2019-01-01 PROCEDURE — 85018 HEMOGLOBIN: CPT | Performed by: SURGERY

## 2019-01-01 PROCEDURE — 83605 ASSAY OF LACTIC ACID: CPT | Performed by: SURGERY

## 2019-01-01 PROCEDURE — 04V33DZ RESTRICTION OF HEPATIC ARTERY WITH INTRALUMINAL DEVICE, PERCUTANEOUS APPROACH: ICD-10-PCS | Performed by: RADIOLOGY

## 2019-01-01 PROCEDURE — 93005 ELECTROCARDIOGRAM TRACING: CPT

## 2019-01-01 PROCEDURE — 31500 INSERT EMERGENCY AIRWAY: CPT

## 2019-01-01 PROCEDURE — 86901 BLOOD TYPING SEROLOGIC RH(D): CPT | Performed by: EMERGENCY MEDICINE

## 2019-01-01 PROCEDURE — 04V53DZ RESTRICTION OF SUPERIOR MESENTERIC ARTERY WITH INTRALUMINAL DEVICE, PERCUTANEOUS APPROACH: ICD-10-PCS | Performed by: RADIOLOGY

## 2019-01-01 PROCEDURE — 31500 INSERT EMERGENCY AIRWAY: CPT | Performed by: INTERNAL MEDICINE

## 2019-01-01 PROCEDURE — 36600 WITHDRAWAL OF ARTERIAL BLOOD: CPT

## 2019-01-01 PROCEDURE — 85049 AUTOMATED PLATELET COUNT: CPT | Performed by: INTERNAL MEDICINE

## 2019-01-01 PROCEDURE — 99153 MOD SED SAME PHYS/QHP EA: CPT

## 2019-01-01 PROCEDURE — 99232 SBSQ HOSP IP/OBS MODERATE 35: CPT | Performed by: SURGERY

## 2019-01-01 PROCEDURE — 99152 MOD SED SAME PHYS/QHP 5/>YRS: CPT

## 2019-01-01 PROCEDURE — 25000128 H RX IP 250 OP 636: Performed by: NURSE ANESTHETIST, CERTIFIED REGISTERED

## 2019-01-01 PROCEDURE — 74018 RADEX ABDOMEN 1 VIEW: CPT

## 2019-01-01 PROCEDURE — 86900 BLOOD TYPING SEROLOGIC ABO: CPT | Performed by: EMERGENCY MEDICINE

## 2019-01-01 PROCEDURE — 97161 PT EVAL LOW COMPLEX 20 MIN: CPT | Mod: GP | Performed by: PHYSICAL THERAPIST

## 2019-01-01 PROCEDURE — 97535 SELF CARE MNGMENT TRAINING: CPT | Mod: GO | Performed by: OCCUPATIONAL THERAPY ASSISTANT

## 2019-01-01 PROCEDURE — 86850 RBC ANTIBODY SCREEN: CPT | Performed by: EMERGENCY MEDICINE

## 2019-01-01 PROCEDURE — 40000281 ZZH STATISTIC TRANSPORT TIME EA 15 MIN

## 2019-01-01 RX ORDER — CALCIUM CHLORIDE 100 MG/ML
INJECTION INTRAVENOUS; INTRAVENTRICULAR
Status: DISCONTINUED
Start: 2019-01-01 | End: 2019-01-01 | Stop reason: HOSPADM

## 2019-01-01 RX ORDER — IOPAMIDOL 612 MG/ML
100 INJECTION, SOLUTION INTRAVASCULAR ONCE
Status: COMPLETED | OUTPATIENT
Start: 2019-01-01 | End: 2019-01-01

## 2019-01-01 RX ORDER — HYDROMORPHONE HYDROCHLORIDE 1 MG/ML
0.3 INJECTION, SOLUTION INTRAMUSCULAR; INTRAVENOUS; SUBCUTANEOUS ONCE
Status: COMPLETED | OUTPATIENT
Start: 2019-01-01 | End: 2019-01-01

## 2019-01-01 RX ORDER — ACETAMINOPHEN 650 MG/1
650 SUPPOSITORY RECTAL EVERY 4 HOURS PRN
Status: DISCONTINUED | OUTPATIENT
Start: 2019-01-01 | End: 2020-01-01

## 2019-01-01 RX ORDER — CHLORHEXIDINE GLUCONATE ORAL RINSE 1.2 MG/ML
15 SOLUTION DENTAL EVERY 12 HOURS
Status: DISCONTINUED | OUTPATIENT
Start: 2019-01-01 | End: 2019-01-01

## 2019-01-01 RX ORDER — SODIUM CHLORIDE 9 MG/ML
INJECTION, SOLUTION INTRAVENOUS CONTINUOUS
Status: DISCONTINUED | OUTPATIENT
Start: 2019-01-01 | End: 2019-01-01

## 2019-01-01 RX ORDER — PROCHLORPERAZINE MALEATE 5 MG
10 TABLET ORAL EVERY 6 HOURS PRN
Status: DISCONTINUED | OUTPATIENT
Start: 2019-01-01 | End: 2019-01-01 | Stop reason: HOSPADM

## 2019-01-01 RX ORDER — SODIUM CHLORIDE, SODIUM LACTATE, POTASSIUM CHLORIDE, CALCIUM CHLORIDE 600; 310; 30; 20 MG/100ML; MG/100ML; MG/100ML; MG/100ML
1000 INJECTION, SOLUTION INTRAVENOUS CONTINUOUS
Status: DISCONTINUED | OUTPATIENT
Start: 2019-01-01 | End: 2019-01-01

## 2019-01-01 RX ORDER — NALOXONE HYDROCHLORIDE 0.4 MG/ML
.1-.4 INJECTION, SOLUTION INTRAMUSCULAR; INTRAVENOUS; SUBCUTANEOUS
Status: DISCONTINUED | OUTPATIENT
Start: 2019-01-01 | End: 2019-01-01

## 2019-01-01 RX ORDER — NOREPINEPHRINE BITARTRATE 0.06 MG/ML
INJECTION, SOLUTION INTRAVENOUS
Status: COMPLETED
Start: 2019-01-01 | End: 2019-01-01

## 2019-01-01 RX ORDER — ACETAMINOPHEN 500 MG
500 TABLET ORAL EVERY 6 HOURS PRN
Status: DISCONTINUED | OUTPATIENT
Start: 2019-01-01 | End: 2019-01-01

## 2019-01-01 RX ORDER — NICOTINE POLACRILEX 4 MG
15-30 LOZENGE BUCCAL
Status: DISCONTINUED | OUTPATIENT
Start: 2019-01-01 | End: 2019-01-01

## 2019-01-01 RX ORDER — FLUMAZENIL 0.1 MG/ML
0.2 INJECTION, SOLUTION INTRAVENOUS
Status: DISCONTINUED | OUTPATIENT
Start: 2019-01-01 | End: 2019-01-01 | Stop reason: HOSPADM

## 2019-01-01 RX ORDER — FUROSEMIDE 10 MG/ML
40 INJECTION INTRAMUSCULAR; INTRAVENOUS ONCE
Status: COMPLETED | OUTPATIENT
Start: 2019-01-01 | End: 2019-01-01

## 2019-01-01 RX ORDER — FENTANYL CITRATE 50 UG/ML
INJECTION, SOLUTION INTRAMUSCULAR; INTRAVENOUS
Status: DISCONTINUED
Start: 2019-01-01 | End: 2019-01-01 | Stop reason: HOSPADM

## 2019-01-01 RX ORDER — FENTANYL CITRATE 50 UG/ML
25-50 INJECTION, SOLUTION INTRAMUSCULAR; INTRAVENOUS EVERY 5 MIN PRN
Status: DISCONTINUED | OUTPATIENT
Start: 2019-01-01 | End: 2019-01-01 | Stop reason: HOSPADM

## 2019-01-01 RX ORDER — PROCHLORPERAZINE 25 MG
25 SUPPOSITORY, RECTAL RECTAL EVERY 12 HOURS PRN
Status: DISCONTINUED | OUTPATIENT
Start: 2019-01-01 | End: 2019-01-01 | Stop reason: HOSPADM

## 2019-01-01 RX ORDER — LIDOCAINE 40 MG/G
CREAM TOPICAL
Status: DISCONTINUED | OUTPATIENT
Start: 2019-01-01 | End: 2020-01-01

## 2019-01-01 RX ORDER — DEXMEDETOMIDINE HYDROCHLORIDE 4 UG/ML
0.2-0.7 INJECTION, SOLUTION INTRAVENOUS CONTINUOUS
Status: DISCONTINUED | OUTPATIENT
Start: 2019-01-01 | End: 2020-01-01

## 2019-01-01 RX ORDER — ALBUTEROL SULFATE 0.83 MG/ML
2.5 SOLUTION RESPIRATORY (INHALATION)
Status: DISCONTINUED | OUTPATIENT
Start: 2019-01-01 | End: 2020-01-01 | Stop reason: HOSPADM

## 2019-01-01 RX ORDER — PROPOFOL 10 MG/ML
5-75 INJECTION, EMULSION INTRAVENOUS CONTINUOUS
Status: DISCONTINUED | OUTPATIENT
Start: 2019-01-01 | End: 2019-01-01

## 2019-01-01 RX ORDER — LIDOCAINE HYDROCHLORIDE 10 MG/ML
INJECTION, SOLUTION INFILTRATION; PERINEURAL
Status: DISCONTINUED
Start: 2019-01-01 | End: 2019-01-01 | Stop reason: HOSPADM

## 2019-01-01 RX ORDER — ONDANSETRON 2 MG/ML
4 INJECTION INTRAMUSCULAR; INTRAVENOUS EVERY 6 HOURS PRN
Status: DISCONTINUED | OUTPATIENT
Start: 2019-01-01 | End: 2019-01-01 | Stop reason: HOSPADM

## 2019-01-01 RX ORDER — CHLORHEXIDINE GLUCONATE ORAL RINSE 1.2 MG/ML
15 SOLUTION DENTAL EVERY 12 HOURS
Status: DISCONTINUED | OUTPATIENT
Start: 2019-01-01 | End: 2020-01-01

## 2019-01-01 RX ORDER — POTASSIUM CHLORIDE 29.8 MG/ML
20 INJECTION INTRAVENOUS
Status: DISCONTINUED | OUTPATIENT
Start: 2019-01-01 | End: 2020-01-01

## 2019-01-01 RX ORDER — LIDOCAINE 40 MG/G
CREAM TOPICAL
Status: DISCONTINUED | OUTPATIENT
Start: 2019-01-01 | End: 2019-01-01 | Stop reason: HOSPADM

## 2019-01-01 RX ORDER — HYDROMORPHONE HYDROCHLORIDE 1 MG/ML
0.2 INJECTION, SOLUTION INTRAMUSCULAR; INTRAVENOUS; SUBCUTANEOUS ONCE
Status: DISCONTINUED | OUTPATIENT
Start: 2019-01-01 | End: 2019-01-01 | Stop reason: HOSPADM

## 2019-01-01 RX ORDER — FENTANYL CITRATE 50 UG/ML
25 INJECTION, SOLUTION INTRAMUSCULAR; INTRAVENOUS EVERY 5 MIN PRN
Status: DISCONTINUED | OUTPATIENT
Start: 2019-01-01 | End: 2019-01-01

## 2019-01-01 RX ORDER — EPHEDRINE SULFATE 50 MG/ML
INJECTION, SOLUTION INTRAMUSCULAR; INTRAVENOUS; SUBCUTANEOUS PRN
Status: DISCONTINUED | OUTPATIENT
Start: 2019-01-01 | End: 2019-01-01

## 2019-01-01 RX ORDER — DEXTROSE MONOHYDRATE 50 MG/ML
INJECTION, SOLUTION INTRAVENOUS CONTINUOUS
Status: DISCONTINUED | OUTPATIENT
Start: 2019-01-01 | End: 2019-01-01

## 2019-01-01 RX ORDER — DEXTROSE MONOHYDRATE 25 G/50ML
25-50 INJECTION, SOLUTION INTRAVENOUS
Status: DISCONTINUED | OUTPATIENT
Start: 2019-01-01 | End: 2019-01-01

## 2019-01-01 RX ORDER — CALCIUM CHLORIDE 100 MG/ML
INJECTION INTRAVENOUS; INTRAVENTRICULAR
Status: COMPLETED
Start: 2019-01-01 | End: 2019-01-01

## 2019-01-01 RX ORDER — FENTANYL CITRATE 50 UG/ML
50 INJECTION, SOLUTION INTRAMUSCULAR; INTRAVENOUS
Status: DISCONTINUED | OUTPATIENT
Start: 2019-01-01 | End: 2019-01-01

## 2019-01-01 RX ORDER — SODIUM CHLORIDE 9 MG/ML
INJECTION, SOLUTION INTRAVENOUS ONCE
Status: DISCONTINUED | OUTPATIENT
Start: 2019-01-01 | End: 2019-01-01

## 2019-01-01 RX ORDER — POTASSIUM CHLORIDE 1500 MG/1
20-40 TABLET, EXTENDED RELEASE ORAL
Status: DISCONTINUED | OUTPATIENT
Start: 2019-01-01 | End: 2020-01-01

## 2019-01-01 RX ORDER — ALBUTEROL SULFATE 0.83 MG/ML
2.5 SOLUTION RESPIRATORY (INHALATION) EVERY 6 HOURS PRN
COMMUNITY
End: 2019-01-01

## 2019-01-01 RX ORDER — NALOXONE HYDROCHLORIDE 0.4 MG/ML
.1-.4 INJECTION, SOLUTION INTRAMUSCULAR; INTRAVENOUS; SUBCUTANEOUS
Status: DISCONTINUED | OUTPATIENT
Start: 2019-01-01 | End: 2019-01-01 | Stop reason: HOSPADM

## 2019-01-01 RX ORDER — HYDROMORPHONE HYDROCHLORIDE 1 MG/ML
INJECTION, SOLUTION INTRAMUSCULAR; INTRAVENOUS; SUBCUTANEOUS
Status: COMPLETED
Start: 2019-01-01 | End: 2019-01-01

## 2019-01-01 RX ORDER — SODIUM CHLORIDE 9 MG/ML
INJECTION, SOLUTION INTRAVENOUS CONTINUOUS
Status: DISCONTINUED | OUTPATIENT
Start: 2019-01-01 | End: 2019-01-01 | Stop reason: HOSPADM

## 2019-01-01 RX ORDER — NOREPINEPHRINE BITARTRATE 0.06 MG/ML
0.03-0.4 INJECTION, SOLUTION INTRAVENOUS CONTINUOUS
Status: DISCONTINUED | OUTPATIENT
Start: 2019-01-01 | End: 2019-01-01

## 2019-01-01 RX ORDER — METOCLOPRAMIDE HYDROCHLORIDE 5 MG/ML
5 INJECTION INTRAMUSCULAR; INTRAVENOUS ONCE
Status: COMPLETED | OUTPATIENT
Start: 2019-01-01 | End: 2019-01-01

## 2019-01-01 RX ORDER — ONDANSETRON 4 MG/1
4 TABLET, ORALLY DISINTEGRATING ORAL EVERY 6 HOURS PRN
Status: DISCONTINUED | OUTPATIENT
Start: 2019-01-01 | End: 2019-01-01 | Stop reason: HOSPADM

## 2019-01-01 RX ORDER — MAGNESIUM SULFATE HEPTAHYDRATE 40 MG/ML
2 INJECTION, SOLUTION INTRAVENOUS ONCE
Status: COMPLETED | OUTPATIENT
Start: 2019-01-01 | End: 2019-01-01

## 2019-01-01 RX ORDER — POTASSIUM CHLORIDE 1.5 G/1.58G
20-40 POWDER, FOR SOLUTION ORAL
Status: DISCONTINUED | OUTPATIENT
Start: 2019-01-01 | End: 2020-01-01

## 2019-01-01 RX ORDER — CALCIUM CHLORIDE 100 MG/ML
1 INJECTION INTRAVENOUS; INTRAVENTRICULAR ONCE
Status: COMPLETED | OUTPATIENT
Start: 2019-01-01 | End: 2019-01-01

## 2019-01-01 RX ORDER — MAGNESIUM SULFATE HEPTAHYDRATE 40 MG/ML
2 INJECTION, SOLUTION INTRAVENOUS ONCE
Status: DISCONTINUED | OUTPATIENT
Start: 2019-01-01 | End: 2019-01-01

## 2019-01-01 RX ORDER — POTASSIUM CHLORIDE 7.45 MG/ML
10 INJECTION INTRAVENOUS
Status: DISCONTINUED | OUTPATIENT
Start: 2019-01-01 | End: 2020-01-01

## 2019-01-01 RX ORDER — NITROGLYCERIN 5 MG/ML
VIAL (ML) INTRAVENOUS
Status: DISCONTINUED
Start: 2019-01-01 | End: 2019-01-01 | Stop reason: HOSPADM

## 2019-01-01 RX ORDER — HYDROMORPHONE HYDROCHLORIDE 1 MG/ML
INJECTION, SOLUTION INTRAMUSCULAR; INTRAVENOUS; SUBCUTANEOUS
Status: DISCONTINUED
Start: 2019-01-01 | End: 2019-01-01 | Stop reason: HOSPADM

## 2019-01-01 RX ORDER — DOXYCYCLINE HYCLATE 100 MG/1
TABLET, DELAYED RELEASE ORAL
COMMUNITY
End: 2019-01-01

## 2019-01-01 RX ORDER — NITROGLYCERIN 5 MG/ML
100-500 VIAL (ML) INTRAVENOUS EVERY 5 MIN PRN
Status: DISCONTINUED | OUTPATIENT
Start: 2019-01-01 | End: 2019-01-01 | Stop reason: HOSPADM

## 2019-01-01 RX ORDER — ACETAMINOPHEN 325 MG/1
650 TABLET ORAL EVERY 4 HOURS PRN
Status: DISCONTINUED | OUTPATIENT
Start: 2019-01-01 | End: 2020-01-01 | Stop reason: HOSPADM

## 2019-01-01 RX ORDER — POTASSIUM CL/LIDO/0.9 % NACL 10MEQ/0.1L
10 INTRAVENOUS SOLUTION, PIGGYBACK (ML) INTRAVENOUS
Status: DISCONTINUED | OUTPATIENT
Start: 2019-01-01 | End: 2020-01-01

## 2019-01-01 RX ORDER — HYDROMORPHONE HYDROCHLORIDE 1 MG/ML
0.2 INJECTION, SOLUTION INTRAMUSCULAR; INTRAVENOUS; SUBCUTANEOUS
Status: DISCONTINUED | OUTPATIENT
Start: 2019-01-01 | End: 2020-01-01

## 2019-01-01 RX ORDER — ETOMIDATE 2 MG/ML
20 INJECTION INTRAVENOUS ONCE
Status: COMPLETED | OUTPATIENT
Start: 2019-01-01 | End: 2019-01-01

## 2019-01-01 RX ORDER — DEXTROSE MONOHYDRATE 25 G/50ML
25-50 INJECTION, SOLUTION INTRAVENOUS
Status: DISCONTINUED | OUTPATIENT
Start: 2019-01-01 | End: 2020-01-01 | Stop reason: HOSPADM

## 2019-01-01 RX ORDER — NOREPINEPHRINE BITARTRATE 0.06 MG/ML
.03-.4 INJECTION, SOLUTION INTRAVENOUS CONTINUOUS
Status: DISCONTINUED | OUTPATIENT
Start: 2019-01-01 | End: 2019-01-01

## 2019-01-01 RX ORDER — PHENYLEPHRINE HCL IN 0.9% NACL 50MG/250ML
0.5-6 PLASTIC BAG, INJECTION (ML) INTRAVENOUS CONTINUOUS
Status: DISCONTINUED | OUTPATIENT
Start: 2019-01-01 | End: 2019-01-01

## 2019-01-01 RX ORDER — GUAIFENESIN AND DEXTROMETHORPHAN HYDROBROMIDE 600; 30 MG/1; MG/1
1 TABLET, EXTENDED RELEASE ORAL EVERY 12 HOURS
COMMUNITY
End: 2019-01-01

## 2019-01-01 RX ORDER — PROPOFOL 10 MG/ML
10-20 INJECTION, EMULSION INTRAVENOUS EVERY 30 MIN PRN
Status: DISCONTINUED | OUTPATIENT
Start: 2019-01-01 | End: 2019-01-01

## 2019-01-01 RX ORDER — METOCLOPRAMIDE HYDROCHLORIDE 5 MG/ML
10 INJECTION INTRAMUSCULAR; INTRAVENOUS EVERY 6 HOURS
Status: DISCONTINUED | OUTPATIENT
Start: 2019-01-01 | End: 2019-01-01 | Stop reason: HOSPADM

## 2019-01-01 RX ORDER — FUROSEMIDE 10 MG/ML
20 INJECTION INTRAMUSCULAR; INTRAVENOUS ONCE
Status: COMPLETED | OUTPATIENT
Start: 2019-01-01 | End: 2019-01-01

## 2019-01-01 RX ORDER — PROPOFOL 10 MG/ML
INJECTION, EMULSION INTRAVENOUS PRN
Status: DISCONTINUED | OUTPATIENT
Start: 2019-01-01 | End: 2019-01-01

## 2019-01-01 RX ORDER — FLUMAZENIL 0.1 MG/ML
0.2 INJECTION, SOLUTION INTRAVENOUS
Status: DISCONTINUED | OUTPATIENT
Start: 2019-01-01 | End: 2019-01-01

## 2019-01-01 RX ORDER — NOREPINEPHRINE BITARTRATE 0.06 MG/ML
0.03-0.4 INJECTION, SOLUTION INTRAVENOUS CONTINUOUS
Status: DISCONTINUED | OUTPATIENT
Start: 2019-01-01 | End: 2019-01-01 | Stop reason: HOSPADM

## 2019-01-01 RX ORDER — NICOTINE POLACRILEX 4 MG
15-30 LOZENGE BUCCAL
Status: DISCONTINUED | OUTPATIENT
Start: 2019-01-01 | End: 2020-01-01 | Stop reason: HOSPADM

## 2019-01-01 RX ORDER — NALOXONE HYDROCHLORIDE 0.4 MG/ML
.1-.4 INJECTION, SOLUTION INTRAMUSCULAR; INTRAVENOUS; SUBCUTANEOUS
Status: DISCONTINUED | OUTPATIENT
Start: 2019-01-01 | End: 2020-01-01 | Stop reason: HOSPADM

## 2019-01-01 RX ORDER — HEPARIN SODIUM,PORCINE 10 UNIT/ML
5-10 VIAL (ML) INTRAVENOUS EVERY 24 HOURS
Status: DISCONTINUED | OUTPATIENT
Start: 2019-01-01 | End: 2020-01-01 | Stop reason: CLARIF

## 2019-01-01 RX ORDER — HEPARIN SODIUM,PORCINE 10 UNIT/ML
5-10 VIAL (ML) INTRAVENOUS
Status: DISCONTINUED | OUTPATIENT
Start: 2019-01-01 | End: 2020-01-01 | Stop reason: HOSPADM

## 2019-01-01 RX ORDER — SODIUM CHLORIDE 9 MG/ML
INJECTION, SOLUTION INTRAVENOUS CONTINUOUS
Status: DISCONTINUED | OUTPATIENT
Start: 2019-01-01 | End: 2020-01-01

## 2019-01-01 RX ORDER — ONDANSETRON 4 MG/1
4 TABLET, ORALLY DISINTEGRATING ORAL EVERY 8 HOURS PRN
COMMUNITY
End: 2019-01-01

## 2019-01-01 RX ORDER — PROPOFOL 10 MG/ML
INJECTION, EMULSION INTRAVENOUS
Status: COMPLETED
Start: 2019-01-01 | End: 2019-01-01

## 2019-01-01 RX ORDER — HYDROMORPHONE HYDROCHLORIDE 1 MG/ML
0.2 INJECTION, SOLUTION INTRAMUSCULAR; INTRAVENOUS; SUBCUTANEOUS ONCE
Status: COMPLETED | OUTPATIENT
Start: 2019-01-01 | End: 2019-01-01

## 2019-01-01 RX ORDER — DEXTROSE MONOHYDRATE 100 MG/ML
INJECTION, SOLUTION INTRAVENOUS CONTINUOUS
Status: DISCONTINUED | OUTPATIENT
Start: 2019-01-01 | End: 2020-01-01

## 2019-01-01 RX ORDER — SODIUM CHLORIDE 9 MG/ML
INJECTION, SOLUTION INTRAVENOUS CONTINUOUS
Status: DISCONTINUED | OUTPATIENT
Start: 2019-01-01 | End: 2019-01-01 | Stop reason: CLARIF

## 2019-01-01 RX ADMIN — DEXTROSE MONOHYDRATE: 50 INJECTION, SOLUTION INTRAVENOUS at 23:45

## 2019-01-01 RX ADMIN — SODIUM CHLORIDE 8 MG/HR: 9 INJECTION, SOLUTION INTRAVENOUS at 19:24

## 2019-01-01 RX ADMIN — SODIUM CHLORIDE: 9 INJECTION, SOLUTION INTRAVENOUS at 00:50

## 2019-01-01 RX ADMIN — HYDROMORPHONE HYDROCHLORIDE 0.2 MG: 1 INJECTION, SOLUTION INTRAMUSCULAR; INTRAVENOUS; SUBCUTANEOUS at 10:16

## 2019-01-01 RX ADMIN — Medication 0.22 MCG/KG/MIN: at 03:26

## 2019-01-01 RX ADMIN — HYDROMORPHONE HYDROCHLORIDE 0.2 MG: 1 INJECTION, SOLUTION INTRAMUSCULAR; INTRAVENOUS; SUBCUTANEOUS at 08:46

## 2019-01-01 RX ADMIN — MIDAZOLAM 4 MG/HR: 5 INJECTION INTRAMUSCULAR; INTRAVENOUS at 22:25

## 2019-01-01 RX ADMIN — SODIUM CHLORIDE: 9 INJECTION, SOLUTION INTRAVENOUS at 17:03

## 2019-01-01 RX ADMIN — SODIUM CHLORIDE: 9 INJECTION, SOLUTION INTRAVENOUS at 16:00

## 2019-01-01 RX ADMIN — SODIUM BICARBONATE 50 MEQ: 84 INJECTION, SOLUTION INTRAVENOUS at 07:49

## 2019-01-01 RX ADMIN — SODIUM CHLORIDE, POTASSIUM CHLORIDE, SODIUM LACTATE AND CALCIUM CHLORIDE 1000 ML: 600; 310; 30; 20 INJECTION, SOLUTION INTRAVENOUS at 18:46

## 2019-01-01 RX ADMIN — CHLORHEXIDINE GLUCONATE 15 ML: 1.2 RINSE ORAL at 20:21

## 2019-01-01 RX ADMIN — CHLORHEXIDINE GLUCONATE 15 ML: 1.2 RINSE ORAL at 19:46

## 2019-01-01 RX ADMIN — HYDROMORPHONE HYDROCHLORIDE 0.3 MG: 1 INJECTION, SOLUTION INTRAMUSCULAR; INTRAVENOUS; SUBCUTANEOUS at 09:05

## 2019-01-01 RX ADMIN — CHLORHEXIDINE GLUCONATE 15 ML: 1.2 RINSE ORAL at 07:41

## 2019-01-01 RX ADMIN — SODIUM CHLORIDE: 9 INJECTION, SOLUTION INTRAVENOUS at 22:05

## 2019-01-01 RX ADMIN — SODIUM BICARBONATE 50 MEQ: 84 INJECTION INTRAVENOUS at 12:48

## 2019-01-01 RX ADMIN — HYDROMORPHONE HYDROCHLORIDE 0.2 MG: 1 INJECTION, SOLUTION INTRAMUSCULAR; INTRAVENOUS; SUBCUTANEOUS at 21:03

## 2019-01-01 RX ADMIN — HYDROMORPHONE HYDROCHLORIDE 0.2 MG: 1 INJECTION, SOLUTION INTRAMUSCULAR; INTRAVENOUS; SUBCUTANEOUS at 04:30

## 2019-01-01 RX ADMIN — SODIUM CHLORIDE 8 MG/HR: 9 INJECTION, SOLUTION INTRAVENOUS at 18:21

## 2019-01-01 RX ADMIN — HEPARIN SODIUM 10000 UNITS: 10000 INJECTION INTRAVENOUS; SUBCUTANEOUS at 07:12

## 2019-01-01 RX ADMIN — HYDROMORPHONE HYDROCHLORIDE 0.2 MG: 1 INJECTION, SOLUTION INTRAMUSCULAR; INTRAVENOUS; SUBCUTANEOUS at 12:53

## 2019-01-01 RX ADMIN — VASOPRESSIN 2.4 UNITS/HR: 20 INJECTION INTRAVENOUS at 02:17

## 2019-01-01 RX ADMIN — SODIUM CHLORIDE: 9 INJECTION, SOLUTION INTRAVENOUS at 02:32

## 2019-01-01 RX ADMIN — SODIUM CHLORIDE 8 MG/HR: 9 INJECTION, SOLUTION INTRAVENOUS at 20:33

## 2019-01-01 RX ADMIN — ACETAMINOPHEN 650 MG: 325 TABLET, FILM COATED ORAL at 10:14

## 2019-01-01 RX ADMIN — SODIUM CHLORIDE 8 MG/HR: 9 INJECTION, SOLUTION INTRAVENOUS at 22:21

## 2019-01-01 RX ADMIN — Medication 0.03 MCG/KG/MIN: at 01:04

## 2019-01-01 RX ADMIN — SODIUM CHLORIDE 8 MG/HR: 9 INJECTION, SOLUTION INTRAVENOUS at 21:07

## 2019-01-01 RX ADMIN — HYDROMORPHONE HYDROCHLORIDE 0.2 MG: 1 INJECTION, SOLUTION INTRAMUSCULAR; INTRAVENOUS; SUBCUTANEOUS at 02:49

## 2019-01-01 RX ADMIN — HEPARIN SODIUM 10000 UNITS: 10000 INJECTION INTRAVENOUS; SUBCUTANEOUS at 16:16

## 2019-01-01 RX ADMIN — VASOPRESSIN 2.4 UNITS/HR: 20 INJECTION INTRAVENOUS at 09:47

## 2019-01-01 RX ADMIN — HYDROMORPHONE HYDROCHLORIDE 0.2 MG: 1 INJECTION, SOLUTION INTRAMUSCULAR; INTRAVENOUS; SUBCUTANEOUS at 12:20

## 2019-01-01 RX ADMIN — DEXTROSE MONOHYDRATE: 50 INJECTION, SOLUTION INTRAVENOUS at 22:04

## 2019-01-01 RX ADMIN — DEXTROSE MONOHYDRATE: 100 INJECTION, SOLUTION INTRAVENOUS at 16:12

## 2019-01-01 RX ADMIN — DEXTROSE MONOHYDRATE: 100 INJECTION, SOLUTION INTRAVENOUS at 14:43

## 2019-01-01 RX ADMIN — SODIUM CHLORIDE 8 MG/HR: 9 INJECTION, SOLUTION INTRAVENOUS at 08:56

## 2019-01-01 RX ADMIN — HYDROMORPHONE HYDROCHLORIDE 0.2 MG: 1 INJECTION, SOLUTION INTRAMUSCULAR; INTRAVENOUS; SUBCUTANEOUS at 08:45

## 2019-01-01 RX ADMIN — Medication 0.2 MCG/KG/MIN: at 04:23

## 2019-01-01 RX ADMIN — OCTREOTIDE ACETATE 50 MCG/HR: 200 INJECTION, SOLUTION INTRAVENOUS; SUBCUTANEOUS at 02:52

## 2019-01-01 RX ADMIN — DEXTROSE MONOHYDRATE 25 ML: 500 INJECTION PARENTERAL at 16:24

## 2019-01-01 RX ADMIN — CHLORHEXIDINE GLUCONATE 15 ML: 1.2 RINSE ORAL at 09:03

## 2019-01-01 RX ADMIN — SODIUM BICARBONATE: 84 INJECTION, SOLUTION INTRAVENOUS at 10:00

## 2019-01-01 RX ADMIN — SODIUM BICARBONATE 50 MEQ: 84 INJECTION, SOLUTION INTRAVENOUS at 14:39

## 2019-01-01 RX ADMIN — Medication 5 ML: at 18:21

## 2019-01-01 RX ADMIN — HYDROMORPHONE HYDROCHLORIDE 0.2 MG: 1 INJECTION, SOLUTION INTRAMUSCULAR; INTRAVENOUS; SUBCUTANEOUS at 08:57

## 2019-01-01 RX ADMIN — MIDAZOLAM 1 MG: 1 INJECTION INTRAMUSCULAR; INTRAVENOUS at 08:27

## 2019-01-01 RX ADMIN — SODIUM CHLORIDE 8 MG/HR: 9 INJECTION, SOLUTION INTRAVENOUS at 11:15

## 2019-01-01 RX ADMIN — CHLORHEXIDINE GLUCONATE 15 ML: 1.2 RINSE ORAL at 19:35

## 2019-01-01 RX ADMIN — PANTOPRAZOLE SODIUM 40 MG: 40 INJECTION, POWDER, FOR SOLUTION INTRAVENOUS at 20:15

## 2019-01-01 RX ADMIN — HYDROMORPHONE HYDROCHLORIDE 0.2 MG: 1 INJECTION, SOLUTION INTRAMUSCULAR; INTRAVENOUS; SUBCUTANEOUS at 19:19

## 2019-01-01 RX ADMIN — MAGNESIUM SULFATE HEPTAHYDRATE 2 G: 40 INJECTION, SOLUTION INTRAVENOUS at 10:01

## 2019-01-01 RX ADMIN — CALCIUM CHLORIDE 1 G: 100 INJECTION, SOLUTION INTRAVENOUS; INTRAVENTRICULAR at 03:45

## 2019-01-01 RX ADMIN — POTASSIUM CHLORIDE 20 MEQ: 1.5 POWDER, FOR SOLUTION ORAL at 18:18

## 2019-01-01 RX ADMIN — FUROSEMIDE 40 MG: 10 INJECTION, SOLUTION INTRAVENOUS at 09:43

## 2019-01-01 RX ADMIN — SODIUM CHLORIDE 500 ML: 9 INJECTION, SOLUTION INTRAVENOUS at 12:00

## 2019-01-01 RX ADMIN — SODIUM CHLORIDE 8 MG/HR: 9 INJECTION, SOLUTION INTRAVENOUS at 15:50

## 2019-01-01 RX ADMIN — Medication 5 ML: at 05:54

## 2019-01-01 RX ADMIN — SODIUM BICARBONATE 50 MEQ: 84 INJECTION, SOLUTION INTRAVENOUS at 21:26

## 2019-01-01 RX ADMIN — PROPOFOL 20 MCG/KG/MIN: 10 INJECTION, EMULSION INTRAVENOUS at 10:03

## 2019-01-01 RX ADMIN — ACETAMINOPHEN 650 MG: 325 TABLET, FILM COATED ORAL at 08:46

## 2019-01-01 RX ADMIN — HYDROMORPHONE HYDROCHLORIDE 0.2 MG: 10 INJECTION, SOLUTION INTRAMUSCULAR; INTRAVENOUS; SUBCUTANEOUS at 02:49

## 2019-01-01 RX ADMIN — Medication 0.2 MCG/KG/HR: at 16:58

## 2019-01-01 RX ADMIN — CALCIUM CHLORIDE 1 G: 100 INJECTION INTRAVENOUS; INTRAVENTRICULAR at 03:45

## 2019-01-01 RX ADMIN — DEXTROSE MONOHYDRATE: 100 INJECTION, SOLUTION INTRAVENOUS at 10:25

## 2019-01-01 RX ADMIN — FENTANYL CITRATE 25 MCG: 50 INJECTION, SOLUTION INTRAMUSCULAR; INTRAVENOUS at 16:20

## 2019-01-01 RX ADMIN — MIDAZOLAM HYDROCHLORIDE 1 MG: 1 INJECTION, SOLUTION INTRAMUSCULAR; INTRAVENOUS at 16:06

## 2019-01-01 RX ADMIN — LIDOCAINE HYDROCHLORIDE 5 ML: 10 INJECTION, SOLUTION INFILTRATION; PERINEURAL at 08:14

## 2019-01-01 RX ADMIN — IOPAMIDOL 100 ML: 612 INJECTION, SOLUTION INTRAVENOUS at 08:23

## 2019-01-01 RX ADMIN — SODIUM CHLORIDE 8 MG/HR: 9 INJECTION, SOLUTION INTRAVENOUS at 05:17

## 2019-01-01 RX ADMIN — Medication 0.02 MCG/KG/MIN: at 14:32

## 2019-01-01 RX ADMIN — HYDROMORPHONE HYDROCHLORIDE 0.2 MG: 1 INJECTION, SOLUTION INTRAMUSCULAR; INTRAVENOUS; SUBCUTANEOUS at 00:23

## 2019-01-01 RX ADMIN — PHENYLEPHRINE HYDROCHLORIDE 0.5 MCG/KG/MIN: 10 INJECTION INTRAVENOUS at 20:37

## 2019-01-01 RX ADMIN — MIDAZOLAM HYDROCHLORIDE 0.5 MG: 1 INJECTION, SOLUTION INTRAMUSCULAR; INTRAVENOUS at 16:30

## 2019-01-01 RX ADMIN — METOCLOPRAMIDE 10 MG: 5 INJECTION, SOLUTION INTRAMUSCULAR; INTRAVENOUS at 02:02

## 2019-01-01 RX ADMIN — Medication: at 04:20

## 2019-01-01 RX ADMIN — MIDAZOLAM 4 MG/HR: 5 INJECTION INTRAMUSCULAR; INTRAVENOUS at 01:09

## 2019-01-01 RX ADMIN — HYDROMORPHONE HYDROCHLORIDE 0.2 MG: 1 INJECTION, SOLUTION INTRAMUSCULAR; INTRAVENOUS; SUBCUTANEOUS at 22:53

## 2019-01-01 RX ADMIN — HYDROMORPHONE HYDROCHLORIDE 0.2 MG: 1 INJECTION, SOLUTION INTRAMUSCULAR; INTRAVENOUS; SUBCUTANEOUS at 18:01

## 2019-01-01 RX ADMIN — SODIUM CHLORIDE 8 MG/HR: 9 INJECTION, SOLUTION INTRAVENOUS at 05:13

## 2019-01-01 RX ADMIN — HYDROMORPHONE HYDROCHLORIDE 0.2 MG: 1 INJECTION, SOLUTION INTRAMUSCULAR; INTRAVENOUS; SUBCUTANEOUS at 18:20

## 2019-01-01 RX ADMIN — ACETAMINOPHEN 650 MG: 325 TABLET, FILM COATED ORAL at 12:21

## 2019-01-01 RX ADMIN — HEPARIN SODIUM 10000 UNITS: 10000 INJECTION INTRAVENOUS; SUBCUTANEOUS at 16:17

## 2019-01-01 RX ADMIN — HYDROMORPHONE HYDROCHLORIDE 0.2 MG: 1 INJECTION, SOLUTION INTRAMUSCULAR; INTRAVENOUS; SUBCUTANEOUS at 14:52

## 2019-01-01 RX ADMIN — DEXTROSE MONOHYDRATE: 50 INJECTION, SOLUTION INTRAVENOUS at 15:30

## 2019-01-01 RX ADMIN — SODIUM CHLORIDE 1000 ML: 9 INJECTION, SOLUTION INTRAVENOUS at 22:20

## 2019-01-01 RX ADMIN — HYDROMORPHONE HYDROCHLORIDE 0.2 MG: 1 INJECTION, SOLUTION INTRAMUSCULAR; INTRAVENOUS; SUBCUTANEOUS at 05:03

## 2019-01-01 RX ADMIN — HYDROMORPHONE HYDROCHLORIDE 0.2 MG: 1 INJECTION, SOLUTION INTRAMUSCULAR; INTRAVENOUS; SUBCUTANEOUS at 04:29

## 2019-01-01 RX ADMIN — PANTOPRAZOLE SODIUM 40 MG: 40 INJECTION, POWDER, FOR SOLUTION INTRAVENOUS at 19:46

## 2019-01-01 RX ADMIN — MIDAZOLAM 2 MG/HR: 5 INJECTION INTRAMUSCULAR; INTRAVENOUS at 16:32

## 2019-01-01 RX ADMIN — MIDAZOLAM 1 MG: 1 INJECTION INTRAMUSCULAR; INTRAVENOUS at 08:32

## 2019-01-01 RX ADMIN — SODIUM CHLORIDE 80 MG: 9 INJECTION, SOLUTION INTRAVENOUS at 19:53

## 2019-01-01 RX ADMIN — HYDROMORPHONE HYDROCHLORIDE 0.2 MG: 1 INJECTION, SOLUTION INTRAMUSCULAR; INTRAVENOUS; SUBCUTANEOUS at 00:13

## 2019-01-01 RX ADMIN — MIDAZOLAM HYDROCHLORIDE 1 MG: 1 INJECTION, SOLUTION INTRAMUSCULAR; INTRAVENOUS at 15:47

## 2019-01-01 RX ADMIN — FENTANYL CITRATE 25 MCG: 50 INJECTION, SOLUTION INTRAMUSCULAR; INTRAVENOUS at 18:12

## 2019-01-01 RX ADMIN — MIDAZOLAM 2 MG/HR: 5 INJECTION INTRAMUSCULAR; INTRAVENOUS at 19:36

## 2019-01-01 RX ADMIN — ACETAMINOPHEN 650 MG: 325 TABLET, FILM COATED ORAL at 14:22

## 2019-01-01 RX ADMIN — SODIUM CHLORIDE 8 MG/HR: 9 INJECTION, SOLUTION INTRAVENOUS at 10:34

## 2019-01-01 RX ADMIN — NITROGLYCERIN 1000 MCG: 10 INJECTION INTRAVENOUS at 16:19

## 2019-01-01 RX ADMIN — CHLORHEXIDINE GLUCONATE 15 ML: 1.2 RINSE ORAL at 08:08

## 2019-01-01 RX ADMIN — HYDROMORPHONE HYDROCHLORIDE 0.2 MG: 1 INJECTION, SOLUTION INTRAMUSCULAR; INTRAVENOUS; SUBCUTANEOUS at 01:47

## 2019-01-01 RX ADMIN — Medication 0.3 MCG/KG/HR: at 09:58

## 2019-01-01 RX ADMIN — SODIUM CHLORIDE 8 MG/HR: 9 INJECTION, SOLUTION INTRAVENOUS at 04:28

## 2019-01-01 RX ADMIN — SODIUM CHLORIDE 8 MG/HR: 9 INJECTION, SOLUTION INTRAVENOUS at 12:55

## 2019-01-01 RX ADMIN — SODIUM CHLORIDE 8 MG/HR: 9 INJECTION, SOLUTION INTRAVENOUS at 23:49

## 2019-01-01 RX ADMIN — SODIUM CHLORIDE: 9 INJECTION, SOLUTION INTRAVENOUS at 10:35

## 2019-01-01 RX ADMIN — FUROSEMIDE 20 MG: 10 INJECTION, SOLUTION INTRAMUSCULAR; INTRAVENOUS at 12:01

## 2019-01-01 RX ADMIN — POTASSIUM CHLORIDE 40 MEQ: 1.5 POWDER, FOR SOLUTION ORAL at 16:02

## 2019-01-01 RX ADMIN — DEXTROSE MONOHYDRATE: 50 INJECTION, SOLUTION INTRAVENOUS at 12:30

## 2019-01-01 RX ADMIN — SODIUM CHLORIDE 8 MG/HR: 9 INJECTION, SOLUTION INTRAVENOUS at 07:38

## 2019-01-01 RX ADMIN — Medication 0.2 MCG/KG/MIN: at 01:43

## 2019-01-01 RX ADMIN — SODIUM BICARBONATE 50 MEQ: 84 INJECTION INTRAVENOUS at 04:48

## 2019-01-01 RX ADMIN — SODIUM CHLORIDE 8 MG/HR: 9 INJECTION, SOLUTION INTRAVENOUS at 00:14

## 2019-01-01 RX ADMIN — Medication 1000 MCG: at 16:19

## 2019-01-01 RX ADMIN — HYDROMORPHONE HYDROCHLORIDE 0.2 MG: 1 INJECTION, SOLUTION INTRAMUSCULAR; INTRAVENOUS; SUBCUTANEOUS at 03:37

## 2019-01-01 RX ADMIN — Medication 0.05 MCG/KG/MIN: at 00:14

## 2019-01-01 RX ADMIN — HYDROMORPHONE HYDROCHLORIDE 0.2 MG: 1 INJECTION, SOLUTION INTRAMUSCULAR; INTRAVENOUS; SUBCUTANEOUS at 14:22

## 2019-01-01 RX ADMIN — SODIUM CHLORIDE 1 UNITS/HR: 9 INJECTION, SOLUTION INTRAVENOUS at 11:15

## 2019-01-01 RX ADMIN — CALCIUM CHLORIDE 1 G: 100 INJECTION, SOLUTION INTRAVENOUS at 18:18

## 2019-01-01 RX ADMIN — HYDROMORPHONE HYDROCHLORIDE 0.2 MG: 1 INJECTION, SOLUTION INTRAMUSCULAR; INTRAVENOUS; SUBCUTANEOUS at 16:26

## 2019-01-01 RX ADMIN — Medication 5 ML: at 06:12

## 2019-01-01 RX ADMIN — DEXTROSE MONOHYDRATE: 50 INJECTION, SOLUTION INTRAVENOUS at 07:43

## 2019-01-01 RX ADMIN — CALCIUM CHLORIDE 1 G: 100 INJECTION INTRAVENOUS; INTRAVENTRICULAR at 06:08

## 2019-01-01 RX ADMIN — SODIUM BICARBONATE 50 MEQ: 84 INJECTION, SOLUTION INTRAVENOUS at 20:47

## 2019-01-01 RX ADMIN — DEXTROSE AND SODIUM CHLORIDE: 5; 900 INJECTION, SOLUTION INTRAVENOUS at 19:02

## 2019-01-01 RX ADMIN — CHLORHEXIDINE GLUCONATE 15 ML: 1.2 RINSE ORAL at 08:58

## 2019-01-01 RX ADMIN — MIDAZOLAM 1 MG: 1 INJECTION INTRAMUSCULAR; INTRAVENOUS at 07:45

## 2019-01-01 RX ADMIN — CHLORHEXIDINE GLUCONATE 15 ML: 1.2 RINSE ORAL at 07:55

## 2019-01-01 RX ADMIN — HYDROMORPHONE HYDROCHLORIDE 0.2 MG: 1 INJECTION, SOLUTION INTRAMUSCULAR; INTRAVENOUS; SUBCUTANEOUS at 07:43

## 2019-01-01 RX ADMIN — FUROSEMIDE 20 MG: 10 INJECTION, SOLUTION INTRAVENOUS at 08:08

## 2019-01-01 RX ADMIN — MIDAZOLAM HYDROCHLORIDE 0.5 MG: 1 INJECTION, SOLUTION INTRAMUSCULAR; INTRAVENOUS at 17:15

## 2019-01-01 RX ADMIN — LIDOCAINE HYDROCHLORIDE 9 ML: 10 INJECTION, SOLUTION INFILTRATION; PERINEURAL at 15:59

## 2019-01-01 RX ADMIN — CHLORHEXIDINE GLUCONATE 15 ML: 1.2 RINSE ORAL at 19:32

## 2019-01-01 RX ADMIN — Medication 0.03 MCG/KG/MIN: at 16:30

## 2019-01-01 RX ADMIN — SODIUM CHLORIDE: 9 INJECTION, SOLUTION INTRAVENOUS at 21:24

## 2019-01-01 RX ADMIN — SODIUM CHLORIDE 250 ML: 9 INJECTION, SOLUTION INTRAVENOUS at 14:05

## 2019-01-01 RX ADMIN — SODIUM CHLORIDE 1000 ML: 9 INJECTION, SOLUTION INTRAVENOUS at 15:46

## 2019-01-01 RX ADMIN — MIDAZOLAM HYDROCHLORIDE 1 MG: 1 INJECTION, SOLUTION INTRAMUSCULAR; INTRAVENOUS at 06:08

## 2019-01-01 RX ADMIN — NOREPINEPHRINE BITARTRATE 0.03 MCG/KG/MIN: 0.06 INJECTION, SOLUTION INTRAVENOUS at 01:04

## 2019-01-01 RX ADMIN — HYDROMORPHONE HYDROCHLORIDE 0.2 MG: 1 INJECTION, SOLUTION INTRAMUSCULAR; INTRAVENOUS; SUBCUTANEOUS at 04:23

## 2019-01-01 RX ADMIN — HYDROMORPHONE HYDROCHLORIDE 0.2 MG: 1 INJECTION, SOLUTION INTRAMUSCULAR; INTRAVENOUS; SUBCUTANEOUS at 13:54

## 2019-01-01 RX ADMIN — DESMOPRESSIN ACETATE 15.36 MCG: 4 INJECTION INTRAVENOUS at 15:01

## 2019-01-01 RX ADMIN — SODIUM CHLORIDE 8 MG/HR: 9 INJECTION, SOLUTION INTRAVENOUS at 11:09

## 2019-01-01 RX ADMIN — SODIUM BICARBONATE 50 MEQ: 84 INJECTION INTRAVENOUS at 04:49

## 2019-01-01 RX ADMIN — CHLORHEXIDINE GLUCONATE 15 ML: 1.2 RINSE ORAL at 20:40

## 2019-01-01 RX ADMIN — SODIUM CHLORIDE 8 MG/HR: 9 INJECTION, SOLUTION INTRAVENOUS at 04:51

## 2019-01-01 RX ADMIN — Medication 10 MG: at 01:54

## 2019-01-01 RX ADMIN — IOPAMIDOL 66 ML: 612 INJECTION, SOLUTION INTRAVENOUS at 17:38

## 2019-01-01 RX ADMIN — HYDROMORPHONE HYDROCHLORIDE 0.2 MG: 1 INJECTION, SOLUTION INTRAMUSCULAR; INTRAVENOUS; SUBCUTANEOUS at 09:12

## 2019-01-01 RX ADMIN — METOCLOPRAMIDE 5 MG: 5 INJECTION, SOLUTION INTRAMUSCULAR; INTRAVENOUS at 10:53

## 2019-01-01 RX ADMIN — MIDAZOLAM HYDROCHLORIDE 2 MG: 1 INJECTION, SOLUTION INTRAMUSCULAR; INTRAVENOUS at 17:32

## 2019-01-01 RX ADMIN — Medication 5 ML: at 06:15

## 2019-01-01 RX ADMIN — HYDROMORPHONE HYDROCHLORIDE 0.2 MG: 1 INJECTION, SOLUTION INTRAMUSCULAR; INTRAVENOUS; SUBCUTANEOUS at 14:19

## 2019-01-01 RX ADMIN — CHLORHEXIDINE GLUCONATE 15 ML: 1.2 RINSE ORAL at 11:34

## 2019-01-01 RX ADMIN — HYDROMORPHONE HYDROCHLORIDE 0.2 MG: 1 INJECTION, SOLUTION INTRAMUSCULAR; INTRAVENOUS; SUBCUTANEOUS at 19:35

## 2019-01-01 RX ADMIN — CHLORHEXIDINE GLUCONATE 15 ML: 1.2 RINSE ORAL at 08:05

## 2019-01-01 RX ADMIN — MAGNESIUM SULFATE HEPTAHYDRATE 2 G: 40 INJECTION, SOLUTION INTRAVENOUS at 20:47

## 2019-01-01 RX ADMIN — FENTANYL CITRATE 25 MCG: 50 INJECTION, SOLUTION INTRAMUSCULAR; INTRAVENOUS at 17:23

## 2019-01-01 RX ADMIN — DEXTROSE MONOHYDRATE: 50 INJECTION, SOLUTION INTRAVENOUS at 01:37

## 2019-01-01 RX ADMIN — PANTOPRAZOLE SODIUM 40 MG: 40 INJECTION, POWDER, FOR SOLUTION INTRAVENOUS at 12:45

## 2019-01-01 RX ADMIN — HEPARIN SODIUM 10000 UNITS: 10000 INJECTION INTRAVENOUS; SUBCUTANEOUS at 08:07

## 2019-01-01 RX ADMIN — DEXTROSE MONOHYDRATE: 100 INJECTION, SOLUTION INTRAVENOUS at 12:19

## 2019-01-01 RX ADMIN — FENTANYL CITRATE 25 MCG: 50 INJECTION, SOLUTION INTRAMUSCULAR; INTRAVENOUS at 16:43

## 2019-01-01 RX ADMIN — HYDROMORPHONE HYDROCHLORIDE 0.2 MG: 1 INJECTION, SOLUTION INTRAMUSCULAR; INTRAVENOUS; SUBCUTANEOUS at 20:01

## 2019-01-01 RX ADMIN — FENTANYL CITRATE 25 MCG: 50 INJECTION, SOLUTION INTRAMUSCULAR; INTRAVENOUS at 17:51

## 2019-01-01 RX ADMIN — ETOMIDATE INJECTION 20 MG: 2 SOLUTION INTRAVENOUS at 15:16

## 2019-01-01 RX ADMIN — DEXTROSE MONOHYDRATE: 100 INJECTION, SOLUTION INTRAVENOUS at 01:42

## 2019-01-01 RX ADMIN — HEPARIN SODIUM 10000 UNITS: 10000 INJECTION INTRAVENOUS; SUBCUTANEOUS at 07:14

## 2019-01-01 RX ADMIN — HYDROMORPHONE HYDROCHLORIDE 0.2 MG: 1 INJECTION, SOLUTION INTRAMUSCULAR; INTRAVENOUS; SUBCUTANEOUS at 17:59

## 2019-01-01 RX ADMIN — HYDROMORPHONE HYDROCHLORIDE 0.2 MG: 1 INJECTION, SOLUTION INTRAMUSCULAR; INTRAVENOUS; SUBCUTANEOUS at 20:38

## 2019-01-01 RX ADMIN — MIDAZOLAM HYDROCHLORIDE 4 MG: 1 INJECTION, SOLUTION INTRAMUSCULAR; INTRAVENOUS at 11:45

## 2019-01-01 RX ADMIN — PANTOPRAZOLE SODIUM 40 MG: 40 INJECTION, POWDER, FOR SOLUTION INTRAVENOUS at 09:05

## 2019-01-01 RX ADMIN — HYDROMORPHONE HYDROCHLORIDE 0.2 MG: 1 INJECTION, SOLUTION INTRAMUSCULAR; INTRAVENOUS; SUBCUTANEOUS at 20:16

## 2019-01-01 RX ADMIN — CHLORHEXIDINE GLUCONATE 15 ML: 1.2 RINSE ORAL at 20:31

## 2019-01-01 RX ADMIN — SODIUM CHLORIDE 8 MG/HR: 9 INJECTION, SOLUTION INTRAVENOUS at 18:18

## 2019-01-01 RX ADMIN — OCTREOTIDE ACETATE 50 MCG/HR: 200 INJECTION, SOLUTION INTRAVENOUS; SUBCUTANEOUS at 01:58

## 2019-01-01 RX ADMIN — CHLORHEXIDINE GLUCONATE 15 ML: 1.2 RINSE ORAL at 20:23

## 2019-01-01 RX ADMIN — CHLORHEXIDINE GLUCONATE 15 ML: 1.2 RINSE ORAL at 08:24

## 2019-01-01 RX ADMIN — MIDAZOLAM 3 MG/HR: 5 INJECTION INTRAMUSCULAR; INTRAVENOUS at 09:04

## 2019-01-01 RX ADMIN — ROCURONIUM BROMIDE 50 MG: 10 INJECTION, SOLUTION INTRAVENOUS at 15:16

## 2019-01-01 RX ADMIN — PROPOFOL 150 MG: 10 INJECTION, EMULSION INTRAVENOUS at 01:45

## 2019-01-01 RX ADMIN — PROPOFOL 40 MCG/KG/MIN: 10 INJECTION, EMULSION INTRAVENOUS at 18:22

## 2019-01-01 RX ADMIN — DEXTROSE MONOHYDRATE: 50 INJECTION, SOLUTION INTRAVENOUS at 12:24

## 2019-01-01 RX ADMIN — HYDROMORPHONE HYDROCHLORIDE 0.2 MG: 1 INJECTION, SOLUTION INTRAMUSCULAR; INTRAVENOUS; SUBCUTANEOUS at 12:18

## 2019-01-01 RX ADMIN — CHLORHEXIDINE GLUCONATE 15 ML: 1.2 RINSE ORAL at 08:34

## 2019-01-01 RX ADMIN — PROPOFOL 40 MCG/KG/MIN: 10 INJECTION, EMULSION INTRAVENOUS at 01:20

## 2019-01-01 RX ADMIN — SODIUM CHLORIDE 8 MG/HR: 9 INJECTION, SOLUTION INTRAVENOUS at 23:19

## 2019-01-01 RX ADMIN — CALCIUM CHLORIDE 1 G: 100 INJECTION, SOLUTION INTRAVENOUS at 13:30

## 2019-01-01 RX ADMIN — Medication 1 G: at 03:07

## 2019-01-01 RX ADMIN — CHLORHEXIDINE GLUCONATE 15 ML: 1.2 RINSE ORAL at 19:56

## 2019-01-01 RX ADMIN — HYDROMORPHONE HYDROCHLORIDE 0.2 MG: 1 INJECTION, SOLUTION INTRAMUSCULAR; INTRAVENOUS; SUBCUTANEOUS at 22:51

## 2019-01-01 RX ADMIN — SODIUM CHLORIDE 8 MG/HR: 9 INJECTION, SOLUTION INTRAVENOUS at 18:15

## 2019-01-01 RX ADMIN — SODIUM CHLORIDE 8 MG/HR: 9 INJECTION, SOLUTION INTRAVENOUS at 15:28

## 2019-01-01 RX ADMIN — MIDAZOLAM HYDROCHLORIDE 1 MG/HR: 5 INJECTION, SOLUTION INTRAMUSCULAR; INTRAVENOUS at 02:12

## 2019-01-01 RX ADMIN — FENTANYL CITRATE 25 MCG: 50 INJECTION, SOLUTION INTRAMUSCULAR; INTRAVENOUS at 15:57

## 2019-01-01 ASSESSMENT — ACTIVITIES OF DAILY LIVING (ADL)
ADLS_ACUITY_SCORE: 36
ADLS_ACUITY_SCORE: 22
ADLS_ACUITY_SCORE: 37
ADLS_ACUITY_SCORE: 37
ADLS_ACUITY_SCORE: 22
ADLS_ACUITY_SCORE: 37
ADLS_ACUITY_SCORE: 37
ADLS_ACUITY_SCORE: 18
ADLS_ACUITY_SCORE: 39
ADLS_ACUITY_SCORE: 37
ADLS_ACUITY_SCORE: 42
ADLS_ACUITY_SCORE: 37
ADLS_ACUITY_SCORE: 22
ADLS_ACUITY_SCORE: 36
ADLS_ACUITY_SCORE: 37
ADLS_ACUITY_SCORE: 37
ADLS_ACUITY_SCORE: 39
ADLS_ACUITY_SCORE: 37
ADLS_ACUITY_SCORE: 37
ADLS_ACUITY_SCORE: 36
ADLS_ACUITY_SCORE: 37
ADLS_ACUITY_SCORE: 34
ADLS_ACUITY_SCORE: 37
ADLS_ACUITY_SCORE: 37
ADLS_ACUITY_SCORE: 26
ADLS_ACUITY_SCORE: 37
ADLS_ACUITY_SCORE: 24
ADLS_ACUITY_SCORE: 37
ADLS_ACUITY_SCORE: 37
ADLS_ACUITY_SCORE: 22
ADLS_ACUITY_SCORE: 39
ADLS_ACUITY_SCORE: 37
ADLS_ACUITY_SCORE: 37
ADLS_ACUITY_SCORE: 26
ADLS_ACUITY_SCORE: 22
ADLS_ACUITY_SCORE: 37
ADLS_ACUITY_SCORE: 39
ADLS_ACUITY_SCORE: 22
ADLS_ACUITY_SCORE: 26
ADLS_ACUITY_SCORE: 37
ADLS_ACUITY_SCORE: 34
ADLS_ACUITY_SCORE: 37
ADLS_ACUITY_SCORE: 26
ADLS_ACUITY_SCORE: 37
ADLS_ACUITY_SCORE: 36
ADLS_ACUITY_SCORE: 39
ADLS_ACUITY_SCORE: 37
ADLS_ACUITY_SCORE: 22
ADLS_ACUITY_SCORE: 34
ADLS_ACUITY_SCORE: 37
ADLS_ACUITY_SCORE: 17
ADLS_ACUITY_SCORE: 37

## 2019-01-01 ASSESSMENT — ENCOUNTER SYMPTOMS
WEAKNESS: 1
BLOOD IN STOOL: 1
NAUSEA: 1

## 2019-01-01 ASSESSMENT — MIFFLIN-ST. JEOR
SCORE: 1173.37
SCORE: 1135.37
SCORE: 1168.37
SCORE: 1168.37

## 2019-12-19 NOTE — ED NOTES
Bed: ED18  Expected date: 12/19/19  Expected time: 5:26 PM  Means of arrival: Ambulance  Comments:  Estella Montes- Black stools

## 2019-12-19 NOTE — ED TRIAGE NOTES
Somali speaking pt brought in by ambulance as she had black tarry stools and tarry vomit today. Lives with family, all Somali speaking and can be reached by phone.    Complains of nausea, when asked if she has allergies she states she is on a lot of medications and is currently on antibiotics for pneumonia.    Pt is extremely contracted as she has arthritis, and is bedridden.    ABC's intact, alert and oriented to situation and self.

## 2019-12-20 PROBLEM — R57.8 HEMORRHAGIC SHOCK (H): Status: ACTIVE | Noted: 2019-01-01

## 2019-12-20 PROBLEM — K92.2 GI BLEED: Status: ACTIVE | Noted: 2019-01-01

## 2019-12-20 NOTE — OR NURSING
Set up for EGD in ICU.  Nephew not comfortable giving consent as not an emergency.  Procedure cancelled.

## 2019-12-20 NOTE — PROGRESS NOTES
ICU Staff:    I examined the patient at the bedside in the ICU. I managed the patient's severe hemorrhagic shock at the bedside in the ICU for the patient's critical illness.  I reviewed the patient's medical records, progress notes, and imaging studies. The patient is a 59 year old Icelandic speaking woman with MS admitted to the ICU for hemorrhagic shock due to massive upper GI bleed.  The patient has a triple lumen catheter and multiple peripheral venous IV catheters.  I've placed an emergency right radial arterial line; the arterial line intermittently had a good wave form however a-line placement was a difficult.  Using the non-invasive cuff pressure measures to manage the patient's two pressor agents.  The patient continues to pass a significant amount of blood from the mouth despite intubation and placement of an OG tube.  The patient has a complex medical history that includes advanced arthritis, chronic bedridden state, gastric or duodenal ulcers, and a recent pneumonia. The family could not be reached at this time despite multiple attempts to reach the family by telephone numbers listed in the medical records.  The massive transfusion protocol has been used and I have also transfused cryoglobulin for fibrinogen less than 150.   I have spoken with the [facility] ist and the [facility] intensives.  An ICU bed is now available and the ambulance has been called.   Will continue IV pantoprazole; IV octreotide; IV Levophed and vasopressin drips.  When the transfusion fo blood was discontinued the patient was CODED with two doses of IV Epi and chest compressions sand a return of pulse and good blood pressures when the PRBC transfusion was resumed.   The patient will be urgently transferred to the [facility] ICU.      MEDICATIONS:  Current Facility-Administered Medications   Medication     EPINEPHrine (ADRENALIN) 5 mg in sodium chloride 0.9 % 250 mL infusion     HYDROmorphone (PF) (DILAUDID)  injection 0.2 mg     metoclopramide (REGLAN) injection 10 mg     midazolam (VERSED) 1 mg/mL in sodium chloride 0.9 % 100 mL infusion     naloxone (NARCAN) injection 0.1-0.4 mg     norepinephrine (LEVOPHED) 16 mg in  mL infusion     octreotide (sandoSTATIN) 1,250 mcg in sodium chloride 0.9 % 250 mL     ondansetron (ZOFRAN-ODT) ODT tab 4 mg    Or     ondansetron (ZOFRAN) injection 4 mg     pantoprazole (PROTONIX) 80 mg in sodium chloride 0.9 % 100 mL infusion     prochlorperazine (COMPAZINE) injection 10 mg    Or     prochlorperazine (COMPAZINE) tablet 10 mg    Or     prochlorperazine (COMPAZINE) Suppository 25 mg     sodium bicarbonate 8.4 % injection     sodium chloride 0.9% infusion     vasopressin (VASOSTRICT) 40 Units in D5W 40 mL infusion        ALLERGIES:  Allergies   Allergen Reactions     No Clinical Screening - See Comments      Unknown medication from the [facility]. Cause hives        SOCIAL HISTORY:  Social History     Socioeconomic History     Marital status:      Spouse name: Not on file     Number of children: Not on file     Years of education: Not on file     Highest education level: Not on file   Occupational History     Not on file   Social Needs     Financial resource strain: Not on file     Food insecurity:     Worry: Not on file     Inability: Not on file     Transportation needs:     Medical: Not on file     Non-medical: Not on file   Tobacco Use     Smoking status: Not on file   Substance and Sexual Activity     Alcohol use: Not on file     Drug use: Not on file     Sexual activity: Not on file   Lifestyle     Physical activity:     Days per week: Not on file     Minutes per session: Not on file     Stress: Not on file   Relationships     Social connections:     Talks on phone: Not on file     Gets together: Not on file     Attends [employer] service: Not on file     Active member of club or organization: Not on file     Attends meetings of clubs or organizations: Not on file      Relationship status: Not on file     Intimate partner violence:     Fear of current or ex partner: Not on file     Emotionally abused: Not on file     Physically abused: Not on file     Forced sexual activity: Not on file   Other Topics Concern     Not on file   Social History Narrative     Not on file       FAMILY HISTORY:  No family history on file.     PHYSICAL EXAM:  Vital Signs: BP (!) 62/47   Pulse 120   Temp 96.2  F (35.7  C) (Axillary)   Resp 18   Wt 41.5 kg (91 lb 7.9 oz)   SpO2 98%     GEN: The patient has severe hypotension  when i arrived and the patient required the massive transfusion protocol and intubation to protect the airway because of massive upper GI bleed.  The patient was urgently intubated and the massive transfusion p[protocol was in effect.  The patient moves all 4 ext and also she is opening her eyes and moving her head.      HEENT: Pupils 2 mm bilaterally.      Chest: CTA bilaterally    Cor: regular tachycardia.      ABD: soft no mass. well healed curvilinear transverse scar in the RLQ. no hernias.    Ext:  Contractions of the upper and the lower extremities.  Poor muscle mass.  Cachetic female.  the patient is pale and she has cool extremities      Neuro: The patient is intubated and sedated.  No focal finding.      A/P:  The patient is a 59 year old Eritrean speaking woman with MS admitted to the ICU for hemorrhagic shock due to massive upper GI bleed.  The patient has a triple lumen catheter and multiple peripheral venous IV catheters.  I've placed an emergency right radial arterial line; the arterial line does not have a good wave form and was a difficult arterial line placement.  I'm using the cuff pressure measures to manage the two pressor agents.  The patient continues to pass a significant amount of blood from the mouth despite intubation and placement of an OG tube.  The patient has a complex medical history that includes advanced arthritis, chronic bedridden state, gastric  or duodenal ulcers, and a recent pneumonia. The family could not be reached at this time despite multiple attempts to reach the family by telephone numbers listed in the medical records.  The massive transfusion protocol and cryoglobulin I have spoken with the [facility] ist and the [facility] intensives.  A bed is available and the ambulance has been called.  The patient will be urgently transferred to the [facility] ICU.      Neuro: The patient is sedated and small incremental doses of dilaudid is being given intermittently for pain.  The patient speaks mostly or only Gabonese and she is intubated and sedated. No able to full assess the patient neuro status.      Cardiac: The patient is in hemorrhagic shock due to a massive upper GI bleed.  The patient has required two pressor agents maintain acceptable MAP pressures..   The serum troponin level was elevated most likely due to the patient shock state.      Pulm:  The patient is intubated and vented to protect the patient's airway and stabilized the patient during the massive transfusion.      GI: Acute GI bleed  massive gastrointestinal hemorrhage likely associated with duodenal ulcer,. the patient has been transfused using the massive transfusion protocol and resuscitated in the ICU.  I have given the patient TXA one dose and also 10 units of cryo for a fibrinogen less than 150 and frozen plasma for an INR of 1.7.  She is being actively warmed using the and has a Swain cath in place.  the GI Medicine service is interested to perform endoscopy where the patient can be cared for by the IR service on an emergency basis.  The patient how has a SBP of 120 and a MAP of over 80.  The patient is intubated and will transport by ACLS ambulance to the [facility] facility with blood running.   Will continue IV pantoprazole; IV octreotide; IV Levophed and vasopressin drips.       : Renal insufficiency. Unknown creatinine baseline; the patient's  Cr 3.27 and the . The serum potassium was elevated and bicarb times one was administered.   Swain in place.  Will continue IV pantoprazole; IV octreotide; IV Levophed and vasopressin drips.    HEME: The patient has an elevated INR and low fibrinogen levels in the setting of massive hemorrhage.  I have transfuse 10 units of cryo and an apheresis unit of platelets.  The patient is being transfused with the massive transfusion protocol. I have given the patient 1 gram of TXA.  The patient is being actively warmed. I have also given one amp of bicarbonate due to the elevated serum potassium in the setting of renal failure and massive blood transfusion.   Acute blood loss anemia.     CODE: FULL CODE    I personally examined and evaluated the patient today in the ICU. The patient is critically ill today.  All of the ICU patient care orders and treatments were placed at my direction. I personally managed the patient's ICU supportive measures that were required as the patient critical illness creates a continuous threat for loss of life for the patient.  Key critical care decisions were made by me today to maintain life saving effective ICU supportive care.  I spent  60 minutes of Critical Care time exclusive of the time required to perform the bedside procedure. providing critical care services at the bedside, and on the critical care unit, evaluating the patient, directing care and reviewing the patient's laboratory values and the patient's radiologic imaging reports associated with the patient's critical illness. I personally examined and evaluated the patient today. The patient s prognosis today is unchanged. I have evaluated all laboratory values and imaging studies from the past 24 hours. I actively assessed this acute critically ill patient and I personally provided supportive interventions that were required because the patient has acute and persistent imminently life threatening organ system failure due to  massive GI hemorrhage.  I . The critical care provided required high complexity decision making and clinical evaluation as the patient has acute critical illness that impairs one or more vital organs. The patient has a high probability of imminent or life threatening deterioration. I personally spent a total of 60 minutes of Critical Care Time  exclusive of the time required to perform the bedside procedure to transfuse the proper blood products, to support the patient's temperature, to determine the need for TXA, and to manage the patient electrolytes and acidosis.  The Critical Care Time was required to personally provide and direct critical care services at the bedside and on the critical care unit for this  acutely critically ill patient. I personally managed the patient's sedation, pain control and analgesia, metabolic abnormalities, nutritional status and vasoactive medications. The patient's family could not be reached at this time.  The patient was interviewed in the ED with a Colombian  and the patient was interested to be FULL CODE and be intubated for her acute critical illness.    Serg Pendleton MD, PhD

## 2019-12-20 NOTE — PROGRESS NOTES
Patient intubated for airway protection with 6.5 ETT 22 @ Teeth, Postive color change, bilateral breath sounds, BMV @ bedside, placed on Vent CMV 14/450/+5/50%, will obtain gas.     Maranda Aguila, RT  December 20, 2019.2:10 AM

## 2019-12-20 NOTE — PROGRESS NOTES
General surgery note    Dr. Larry made us aware of the patient situation should she need surgical intervention.  Massive bleed from duodenal ulcer status post coiling of GDA with apparent hemostasis.  If she rebleeds options may be limited to surgical intervention.

## 2019-12-20 NOTE — PROGRESS NOTES
I spoke in Armenian with son who is currently out of state. Explained that pt is hospitalized at [facility] in critical condition and sent emergently to IR due to bleeding. Son will attempt to arrive as soon as possible (this evening).

## 2019-12-20 NOTE — PROGRESS NOTES
Critical Care Progress Note      12/20/2019    Name: Eliza Thurman MRN#: [id number]   Age: 59 year old YOB: 1960     Hsptl Day# 0  ICU DAY #0    MV DAY #0             Problem List:   Active Problems:    Hemorrhagic shock (H)  upper GI bleeding  RONALD  Lactic acidosis         Summary/Hospital Course:     59F Lithuanian speaking with pmh of MS and gastric ulcer disease now presented to OSH with melena and emesis.  She was very unstable prior to transfer and apparently cardiac arrested just prior to transport.  On arrival here she was taken immediately to IR where embolization was performed and hemostasis was apparently achieved.    On arrival back to the ICU she is awake and following commands, but intubated status limits history taking.      Assessment and plan :     Eliza Thurman IS a 59 year old female admitted on 12/20/2019 for upper GI bleeding.   I have personally reviewed the daily labs, imaging studies, cultures and discussed the case with referring physician and consulting physicians.   My assessment and plan by system for this patient is as follows:    Neurology/Psychiatry:   1. H/o advanced MS  2. Analgesia:  Prn dilaudid  3. Sedation:  Propofol.  Prn midazolam  4. S/p cardiac arrest:  Mental status preserved.  No hypothermia needed.    Cardiovascular:   1  Hemorrhagic shock:  Finishing massive transfusion protocol.  Pressor needs responding appropriately; levophed now off -- continues on vasopressin.  Trending lactates. Has sufficient access.    Pulmonary/Ventilator Management:   1. Will keep intubated today in case return to IR/OR is needed.     GI and Nutrition:   1. NPO  2. Upper GI bleeding:  Appreciate GI and IR efforts.  Embolization by IR believed successful.  Continue protonix and octreotide drips today.  Gen surgery consulted;  Per IR would need operative mgmt if re-bleeds.    Renal/Fluids/Electrolytes:   1. Ronald:  Creat elevated to 3.27 on arrival.  Now downtrending.  Monitor  creat and UOP.    Infectious Disease:   1. No acute issues    Endocrine:   1. Monitor fsg.  Prn dextrose vs insulin    Hematology/Oncology:   1. Wbc 16.1 elevated; suspect reactive  2.  hgb 8.9 --  Acceptable.  Trending closely  3.  pltlts 120 --  Acceptable for now.  Trending closely.  4.  INR 1.67 -- acceptable. trending  5.  Fibrinogen 69 -- acceptable.  trending    IV/Access:   1. Venous access - central line    ICU Prophylaxis:   1. DVT: mech  2. VAP: HOB 30 degrees, chlorhexidine rinse  3. Stress Ulcer: PPI drip  4. Restraints: Nonviolent soft two point restraints required and necessary for patient safety and continued cares and good effect as patient continues to pull at necessary lines, tubes despite education and distraction. Will readdress daily.   5. Wound care - per unit routine   6. Feeding - npo  7. Family Update: I updated the patient's english speaking nephew by phone  8. Disposition - icu           Interim History:     Now s/p emoblization by IR with presumed hemostasis.  Unable to obtain histories directly from pt as she is intubated/sedated.         Key Medications:       chlorhexidine  15 mL Mouth/Throat Q12H     propofol           norepinephrine       octreotide (sandoSTATIN) infusion ADULT       pantoprazole (PROTONIX) infusion ADULT/PEDS GREATER than or EQUAL to 45 kg       propofol (DIPRIVAN) infusion       vasopressin (PITRESSIN) infusion ADULT (40 mL)                 Physical Examination:   Temp:  [96.2  F (35.7  C)-97.8  F (36.6  C)] 96.9  F (36.1  C)  Pulse:  [] 100  Heart Rate:  [] 96  Resp:  [8-33] 33  BP: ()/() 163/98  MAP:  [24 mmHg-96 mmHg] 64 mmHg  Arterial Line BP: ()/(16-87) 66/62  FiO2 (%):  [40 %-50 %] 40 %  SpO2:  [72 %-100 %] 100 %      Intake/Output Summary (Last 24 hours) at 12/20/2019 0935  Last data filed at 12/20/2019 0800  Gross per 24 hour   Intake 1627 ml   Output 4800 ml   Net -3173 ml       Wt Readings from Last 4 Encounters:   12/20/19  41.5 kg (91 lb 7.9 oz)     Arterial Line BP: ()/(16-87) 66/62  MAP:  [24 mmHg-96 mmHg] 64 mmHg  BP - Mean:  [] 127  Ventilation Mode: CMV/AC  (Continuous Mandatory Ventilation/ Assist Control)  FiO2 (%): 40 %  Rate Set (breaths/minute): 14 breaths/min  Tidal Volume Set (mL): 450 mL  PEEP (cm H2O): 5 cmH2O  Oxygen Concentration (%): 50 %  Resp: (!) 33    Recent Labs   Lab 12/20/19  0340   PH 7.21*   PCO2 24*   PO2 227*   HCO3 10*   O2PER Ventilator       GEN: no acute distress, sedated   HEENT: head ncat, sclera anicteric, OP patent, trachea midline   PULM: unlabored synchronous with vent, clear anteriorly    CV/COR: RRR S1S2 no gallop,  No rub, no murmur  ABD: soft nontender, hypoactive bowel sounds, no mass  EXT:  No Edema,   Warm x4  NEURO: awakens and follows when sedation weaned.  Moves all 4.  SKIN: no obvious rash  LINES: clean, dry intact         Data:   All data and imaging reviewed     ROUTINE ICU LABS (Last four results)  CMP  Recent Labs   Lab 12/20/19  0555 12/20/19  0340 12/20/19  0219 12/19/19  1909   * 146*  --  142   POTASSIUM 4.9 5.2 5.4* 5.3   CHLORIDE 123* 121*  --  116*   CO2 14* 11*  --  13*   ANIONGAP 12 14  --  13   * 216*  --  105*   * 130*  --  147*   CR 2.26* 2.49*  --  3.27*   GFRESTIMATED 23* 20*  --  15*   GFRESTBLACK 27* 24*  --  17*   MORGAN 8.1* 6.5*  --  7.3*   PROTTOTAL 3.9* 4.3*  --  5.2*   ALBUMIN 1.8* 1.8*  --  2.0*   BILITOTAL 0.5 0.4  --  0.2   ALKPHOS 42 43  --  81   AST 28 22  --  33   ALT 22 17  --  24     CBC  Recent Labs   Lab 12/20/19  0819 12/20/19  0640 12/20/19  0340 12/20/19 0219 12/19/19  1909   WBC 16.1* 15.4* 8.1  --  9.0   RBC 2.97* 3.78* 3.14*  --  2.53*   HGB 8.9* 11.1* 9.5* 10.7* 5.0*   HCT 26.5* 33.3* 28.9*  --  16.8*   MCV 89 88 92  --  66*   MCH 30.0 29.4 30.3  --  19.8*   MCHC 33.6 33.3 32.9  --  29.8*   RDW 14.6 14.8 17.0*  --  20.3*   * 29* 53* 98* 283     INR  Recent Labs   Lab 12/20/19  0555 12/20/19 0340  12/20/19  0219 12/19/19  1909   INR 1.67* 1.32* 1.74* 1.34*     Arterial Blood Gas  Recent Labs   Lab 12/20/19  0340   PH 7.21*   PCO2 24*   PO2 227*   HCO3 10*   O2PER Ventilator       All cultures:  Recent Labs   Lab 12/20/19  0100 12/19/19  1908   CULT No growth after 4 hours No growth after 8 hours     Recent Results (from the past 24 hour(s))   XR Chest Port 1 View    Narrative    EXAM: XR CHEST PORT 1 VW  LOCATION: [facility]  DATE/TIME: 12/19/2019 7:21 PM    INDICATION: Melena  COMPARISON: None.      Impression    IMPRESSION: Chronic rotator cuff arthropathy in the shoulders. Normal heart size. Patient is rotated to left. Lungs clear.   XR Chest Port 1 View    Narrative    EXAM: XR CHEST PORT 1 VW  LOCATION: [facility]  DATE/TIME: 12/19/2019 8:52 PM    INDICATION: Nausea.  COMPARISON: None.      Impression    IMPRESSION: Patient is rotated to the left. Right IJ venous catheter with tip in the SVC. Normal heart size. No pulmonary infiltrates.   XR Chest Port 1 View    Narrative    EXAM: XR CHEST PORT 1 VW  LOCATION: [facility]  DATE/TIME: 12/20/2019 3:21 AM    INDICATION: ETT placement  COMPARISON: 12/19/2019 at 8:36 PM      Impression    IMPRESSION: New ETT with tip in the mid trachea. Otherwise no change. No new infiltrates. Normal heart size. Right IJ line tip in SVC.   XR Abdomen Port 1 View    Narrative    EXAM: XR ABDOMEN PORT 1 VW  LOCATION: [facility]  DATE/TIME: 12/20/2019 3:37 AM    INDICATION: Post OG placement.    COMPARISON: None.      Impression    IMPRESSION: Enteric tube tip in the stomach.       Labs personally reviewed/interpreted:  See a/p    cxr (personally reviewed/interpreted):  Ett, og and line in acceptable position.  Lungs clear.    ekg (personally reviewed/interpreted):  Sinus 92, nl int, nl axis, no acute ischemic changes.    D/w Dr. Jorge of the gen surgery service, Dr. Bui of IR, and Ben of [facility].    Billing: This  patient is critically ill: Yes. Total critical care time today 60 min, in addition to and independently of 45 min spent by Dr. Hansen earlier in the day.  Total for today now 105 min.

## 2019-12-20 NOTE — ANESTHESIA CARE TRANSFER NOTE
Patient: Eliza Thurman    * No procedures listed *    Diagnosis: * No pre-op diagnosis entered *  Diagnosis Additional Information: No value filed.    Anesthesia Type:   No value filed.     Note:  Airway :ETT  Patient transferred to:ICU  Comments: Pt on vent per RT. Report to RN. VSS.      Vitals: (Last set prior to Anesthesia Care Transfer)    CRNA VITALS  12/20/2019 0130 - 12/20/2019 0226      12/20/2019             SpO2:  100 %                Electronically Signed By: ELLIOTT Sam CRNA  December 20, 2019  2:26 AM

## 2019-12-20 NOTE — PROGRESS NOTES
Pt arrived to ICU from [facility] intubated at about 0545. IR called in for emergent intervention per GI MD. Pt had copious amount of dark bloody drainage from OG tube while connected to LIS. Pt given blood products provided from EMS and MTP initiated. BPs maintained with 0.4 Levo and 2.4 Vaso. Assisted to transfer to IR when team ready about 0610.

## 2019-12-20 NOTE — PHARMACY-ADMISSION MEDICATION HISTORY
Admission medication history interview status for this patient is complete. See [facility] admission navigator for allergy information, prior to admission medications and immunization status.     Medication history interview source(s):Patient  Medication history resources (including written lists, pill bottles, clinic record):None    Changes made to PTA medication list:  Added: none  Deleted: albuterol Neb, augmentin, mucinex-DM, zofran  Changed: none    Actions taken by pharmacist (provider contacted, etc):spoke to patient via Ukrainian      Additional medication history information:None    Medication reconciliation/reorder completed by provider prior to medication history? No    For patients on insulin therapy: no (Yes/No)   Lantus/levemir/NPH/Mix 70/30 dose: ___ in AM/PM or twice daily   Sliding scale Novolog Y/N   If Yes, do you have a baseline novolog pre-meal dose: ______units with meals   Patients eat three meals a day: Y/N ---  How many episodes of hypoglycemia (low blood glucose) do you have weekly: ---   How many missed doses do you have a week: ---  How many times do you check your blood glucose per day: ---  Any Barriers to therapy: cost of medications/comfortable with giving injections (if applicable)/ comfortable and confident with current diabetes regimen ---      Prior to Admission medications    Not on File

## 2019-12-20 NOTE — CODE/RAPID RESPONSE
CODE BLUE:    The patient is a 59 year old Singaporean speaking woman with MS admitted to the ICU for hemorrhagic shock due to massive upper GI bleed.  The patient has a triple lumen catheter and multiple peripheral venous IV catheters.  I've placed an emergency right radial arterial line; the arterial line intermittently had a good wave form however a-line placement was a difficult.  Using the non-invasive cuff pressure measures to manage the patient's two pressor agents.  The patient continues to pass a significant amount of blood from the mouth despite intubation and placement of an OG tube.  The patient has a complex medical history that includes advanced arthritis, chronic bedridden state, gastric or duodenal ulcers, and a recent pneumonia. The family could not be reached at this time despite multiple attempts to reach the family by telephone numbers listed in the medical records.  The massive transfusion protocol has been used and I have also transfused cryoglobulin for fibrinogen less than 150.   I have spoken with the [facility] ist and the [facility] intensives.  An ICU bed is now available and the ambulance has been called.   Will continue IV pantoprazole; IV octreotide; IV Levophed and vasopressin drips.  When the transfusion fo blood was discontinued the patient was CODED with two doses of IV Epi and chest compressions sand a return of pulse and good blood pressures when the PRBC transfusion was resumed.   The patient will be urgently transferred to the [facility] ICU.      Several rounds of chest compressions and IV Epi were required for patient who was pulseless.  Bicarbonate and IV calcium were also given.  Blood transfusion were resumed and the patient regained a pulses and patient's blood pressure was improved again after several rounds of epi and several rounds of compressions.  Pharmacy present for the CODE BLUE..  Hospitalist was also present for the CODE BLUE.    Serg Pendleton,  MD, PhD

## 2019-12-20 NOTE — CONSULTS
GASTROENTEROLOGY CONSULTATION     Eliza Thurman   [address] [facility] [address]   59 year old female   Admission Date/Time: 12/20/2019   Encounter Date: 12/20/2019  Primary Care Provider: No Ref-Primary, Physician     Referring / Attending Physician: Arcadio Larry   We were asked to see the patient in consultation by Dr. Larry for evaluation of GI bleeding.     HPI: Eliza Thurman is a 59 year old female with a past medical history significant for arthritis, chronic bedridden state, recent pneumonia and a history of ulcer disease.  The patient is Laotian speaking and currently intubated 12 her history is obtained from the chart and nursing staff.  The patient presented to the emergency department yesterday for evaluation of melena and weakness.  In the ED she was found to have severe hypotension, tachycardia, lethargy and cool extremities.  Massive transfusion protocol was initiated and she was started on IV pantoprazole, octreotide and levophed.  Our on-call gastroenterologist was contacted last night and emergent upper endoscopy was planned however the patient further decompensated and she was transferred to [facility] for interventional radiology.  She had mesenteric angiogram performed this morning which did reveal active bleeding and her gastroduodenal artery was embolized.  She is currently intubated in the intensive care unit.    Past Medical History  Arthritis  Peptic ulcer disease    Past Surgical History  Unable to obtain    Family History  Unable to obtain    Social History  Social History     Socioeconomic History     Marital status:      Spouse name: Not on file     Number of children: Not on file     Years of education: Not on file     Highest education level: Not on file   Occupational History     Not on file   Social Needs     Financial resource strain: Not on file     Food insecurity:     Worry: Not on file     Inability: Not on file     Transportation  needs:     Medical: Not on file     Non-medical: Not on file   Tobacco Use     Smoking status: Not on file   Substance and Sexual Activity     Alcohol use: Not on file     Drug use: Not on file     Sexual activity: Not on file   Lifestyle     Physical activity:     Days per week: Not on file     Minutes per session: Not on file     Stress: Not on file   Relationships     Social connections:     Talks on phone: Not on file     Gets together: Not on file     Attends Taoism service: Not on file     Active member of club or organization: Not on file     Attends meetings of clubs or organizations: Not on file     Relationship status: Not on file     Intimate partner violence:     Fear of current or ex partner: Not on file     Emotionally abused: Not on file     Physically abused: Not on file     Forced sexual activity: Not on file   Other Topics Concern     Not on file   Social History Narrative     Not on file       Medications  Prior to Admission medications    Not on File       Allergies:  No clinical screening - see comments    ROS: A ten point review of systems was obtained and negative other than the symptoms noted above in the HPI.     Physical Exam:   /77   Pulse 100   Temp 96.6  F (35.9  C)   Resp 16   SpO2 100%    Constitutional: chronically ill appearing, alert, no acute distress  Cardiovascular: regular rate and rhythm, no murmurs,rubs or gallops  Respiratory: clear to auscultation bilaterally  Psychiatric: normal pleasant affect  Head: atraumatic, normocephalic  Neck: supple, no thyromegaly  ENT: mucous membranes are moist, no oral lesions are noted  Abdomen: soft, non-tender, non-distended, normally active bowel sound. No masses or hepatosplenomegaly is appreciated. No rebound tenderness or guarding  Neuro: Neurologically intact grossly  Skin: warm, dry, no rashes are noted    ADDITIONAL COMMENTS:   I reviewed the patient's new clinical lab test results.   Recent Labs   Lab Test 12/20/19  7433  12/20/19  0819 12/20/19  0640 12/20/19  0555 12/20/19  0340   WBC  --  16.1* 15.4*  --  8.1   HGB  --  8.9* 11.1*  --  9.5*   MCV  --  89 88  --  92   PLT  --  120* 29*  --  53*   INR 1.26*  --   --  1.67* 1.32*      Recent Labs   Lab Test 12/20/19  0935 12/20/19  0555 12/20/19  0340   * 149* 146*   POTASSIUM 4.3 4.9 5.2   CHLORIDE 121* 123* 121*   CO2 16* 14* 11*   BUN 98* 104* 130*   CR 2.17* 2.26* 2.49*   ANIONGAP 11 12 14   MORGAN 8.6 8.1* 6.5*      Recent Labs   Lab Test 12/20/19  0935 12/20/19  0555 12/20/19  0340 12/19/19  1909   ALBUMIN 2.3* 1.8* 1.8* 2.0*   BILITOTAL 0.8 0.5 0.4 0.2   ALT 23 22 17 24   AST 39 28 22 33   ALKPHOS 45 42 43 81   LIPASE  --   --   --  209      12/20/19 Mesenteric Angiogram  Findings: Mesenteric angiogram showed large bleed from the GDA. Successful coil embolization of the GDA from the celiac and pancreaticoduodenal arcade branch from the SMA. No active bleeding at the end of the procedure. During coil embolization of the GDA one of the coils backed out into the proper hepatic artery. Attempts were made to snare the coil without success. Additional attempts to retrieve the coil were terminated due to the fact that the patient was actively bleeding and unstable. At the end of the procedure the a final celiac run was performed and images show the hepatic arteries are patent.    Assessment: 59-year-old female presenting with melena found to have hemorrhagic shock.  The patient had a massive bleed last night complicated by a CODE BLUE requiring 2 doses of epinephrine and chest compressions with a return of her pulse.  She had angiogram this morning with  embolization of the GDA.  She is still requiring 1 pressor but looks better from a hemodynamic standpoint.    Plan:   -NPO  -Continue current cares in ICU  -Continue IV PPI   -EGD this afternoon in ICU to see if endoscopic therapy would be an option if she starts to bleed again  -Further recommendations to follow    I discussed  the patient's findings and plan with Dr. Reece Sanders who will also independently see and examine the patient.     Phillip Mari PA-C  [facility]  Cell:  [phone] Monday through Friday 4796-2353  Office: [phone]

## 2019-12-20 NOTE — PROGRESS NOTES
Multiple attempts to contact patient's son have been made without any contact during the past 3 hours. Will attempt one more time before patient is transferred to [facility].

## 2019-12-20 NOTE — PROGRESS NOTES
Attempted to call son to provide update of patient status. Left voice mail to return call. Will attempt to call again.     Addendum     Multiple attempts unsuccessful to reach son to notify pt status and transfer to [facility].     Attempted to call family after pt left with transport to [facility]. Notified [employer] of pt transport on the way.

## 2019-12-20 NOTE — PROCEDURES
[facility]    Procedure: Visceral angiogram.   Date/Time: 12/20/2019 8:30 AM  Performed by: Nicole Bui DO  Authorized by: Nicole Bui DO     UNIVERSAL PROTOCOL   Site Marked: Yes  Prior Images Obtained and Reviewed:  Yes  Required items: Required blood products, implants, devices and special equipment available    Patient identity confirmed:  Verbally with patient  Patient was reevaluated immediately before administering moderate or deep sedation or anesthesia  Confirmation Checklist:  Patient's identity using two indicators, relevant allergies, procedure was appropriate and matched the consent or emergent situation and correct equipment/implants were available  Time out: Immediately prior to the procedure a time out was called    Universal Protocol: the Joint Commission Universal Protocol was followed    Preparation: Patient was prepped and draped in usual sterile fashion           ANESTHESIA    Anesthesia: Local infiltration      SEDATION    Patient Sedated: No    See dictated procedure note for full details.  Findings: Mesenteric angiogram showed large bleed from the GDA. Successful coil embolization of the GDA from the celiac and pancreaticoduodenal arcade branch from the SMA. No active bleeding at the end of the procedure. During coil embolization of the GDA one of the coils backed out into the proper hepatic artery. Attempts were made to snare the coil without success. Additional attempts to retrieve the coil were terminated due to the fact that the patient was actively bleeding and unstable. At the end of the procedure the a final celiac run was performed and images show the hepatic arteries are patent.    If she rebleeds surgical intervention should be considered as the branches to the GDA are already embolized.      Specimens: none    Complications: None    Condition: Unstable and Stable    Plan: Bed rest for 2 hours with left as straight as  possible.    PROCEDURE   Patient Tolerance:  Patient tolerated the procedure well with no immediate complications    Length of time physician/provider present for 1:1 monitoring during sedation: 0

## 2019-12-20 NOTE — H&P
Intensivist Daily Note  12/20/2019      Brief History:  Eliza Thurman is a 59 yof, Vincentian speaking who presented to the ED at [facility] yesterday evening with melena and emesis.  History is obtained from the EMR and signout from other providers as the patient is unaccompanied and is intubated and sedated.  ED notes suggest that she had pneumonia a few weeks ago and recently stopped antibiotics.  On arrival tot he ED lactic acid was 1.3 and hemoglobin was 5.0.  Blood pressures were generally on the lower side throughout her ED stay mostly 90-100s systolic.  GI was contacted and the patient was admitted to the ICU for further cares.  Shortly following admission to the ICU she became lethargic and quite hypotensive, down to 50s systolic.  She was intubated and ultimately massive transfusion protocol was required.  GI felt strongly that she needed to be scoped at a facility with IR and thus efforts to transfer to [facility] were begun.  Prior to transfer, transfusion of blood products was briefly stopped and she coded. Per documentation, several rounds of chest compression and IV epi were required before ROSC was obtained.      On arrival to the ICU she was intubated with copious hematemesis.  Blood pressures were adequate on Levophed 0.4 and vasopressin 2.4.  Blood products were hanging. Per EMS she received in total 10U pRBC, 1 cryo, 2 platelet and 5 plasma.  She had a total of 2200 mL output from NG for the relatively brief period it was turned to suction.  I gave 1 mg versed for sedation as well as 1g calcium gluconate and transferred the patient to IR.      PMH:  1. Multiple Sclerosis  2. H/o ulcers        Social History:  Social History     Socioeconomic History     Marital status:      Spouse name: Not on file     Number of children: Not on file     Years of education: Not on file     Highest education level: Not on file   Occupational History     Not on file   Social Needs     Financial resource strain:  Not on file     Food insecurity:     Worry: Not on file     Inability: Not on file     Transportation needs:     Medical: Not on file     Non-medical: Not on file   Tobacco Use     Smoking status: Not on file   Substance and Sexual Activity     Alcohol use: Not on file     Drug use: Not on file     Sexual activity: Not on file   Lifestyle     Physical activity:     Days per week: Not on file     Minutes per session: Not on file     Stress: Not on file   Relationships     Social connections:     Talks on phone: Not on file     Gets together: Not on file     Attends Worship service: Not on file     Active member of club or organization: Not on file     Attends meetings of clubs or organizations: Not on file     Relationship status: Not on file     Intimate partner violence:     Fear of current or ex partner: Not on file     Emotionally abused: Not on file     Physically abused: Not on file     Forced sexual activity: Not on file   Other Topics Concern     Not on file   Social History Narrative     Not on file       Allergy:  Allergies   Allergen Reactions     No Clinical Screening - See Comments      Unknown medication from the [facility]. Cause hives        Medications:  No current facility-administered medications for this encounter.      No current outpatient medications on file.     Facility-Administered Medications Ordered in Other Encounters   Medication     EPINEPHrine (ADRENALIN) 5 mg in sodium chloride 0.9 % 250 mL infusion     HYDROmorphone (PF) (DILAUDID) injection 0.2 mg     magnesium sulfate 2 g in NS intermittent infusion ([facility] or [facility])     metoclopramide (REGLAN) injection 10 mg     midazolam (VERSED) 1 mg/mL in sodium chloride 0.9 % 100 mL infusion     naloxone (NARCAN) injection 0.1-0.4 mg     norepinephrine (LEVOPHED) 16 mg in  mL infusion     octreotide (sandoSTATIN) 1,250 mcg in sodium chloride 0.9 % 250 mL     ondansetron (ZOFRAN-ODT) ODT tab 4 mg    Or     ondansetron (ZOFRAN)  injection 4 mg     pantoprazole (PROTONIX) 80 mg in sodium chloride 0.9 % 100 mL infusion     prochlorperazine (COMPAZINE) injection 10 mg    Or     prochlorperazine (COMPAZINE) tablet 10 mg    Or     prochlorperazine (COMPAZINE) Suppository 25 mg     sodium bicarbonate 150 mEq in water for infusion     sodium bicarbonate 8.4 % injection 50 mEq     sodium bicarbonate 8.4 % injection 50 mEq     sodium bicarbonate 8.4 % injection     sodium chloride 0.9% infusion     vasopressin (VASOSTRICT) 40 Units in D5W 40 mL infusion         Physical examination:  Vital signs:  6 am BP: 118/83  HR: 124  RR: SpO2: 99%    6:05 am: /86; , R 30 SpO2 100%    General: Intubated.  Copious hematemesis/blood secretions from oral cavity.  Cachectic appearing female.  HEENT: neck supple, symmetrical  Lungs: Coarse bilaterally and diminished over lung bases.  CVS: Normal S1 & S2, no murmur.  tachycardic  Abdomen: soft, non tender  Extremities/musculoskeletal: no edema  Neurology: moves all 4 extremities spontaneously  Skin: no rash  Exam of Line sites: No erythema or discharge.    Data:    ROUTINE ICU LABS (Last four results)  CMP  Recent Labs   Lab 12/20/19 0340 12/20/19 0219 12/19/19 1909   *  --  142   POTASSIUM 5.2 5.4* 5.3   CHLORIDE 121*  --  116*   CO2 11*  --  13*   ANIONGAP 14  --  13   *  --  105*   *  --  147*   CR 2.49*  --  3.27*   GFRESTIMATED 20*  --  15*   GFRESTBLACK 24*  --  17*   MORGAN 6.5*  --  7.3*   PROTTOTAL 4.3*  --  5.2*   ALBUMIN 1.8*  --  2.0*   BILITOTAL 0.4  --  0.2   ALKPHOS 43  --  81   AST 22  --  33   ALT 17  --  24     CBC  Recent Labs   Lab 12/20/19  0340 12/20/19 0219 12/19/19 1909   WBC 8.1  --  9.0   RBC 3.14*  --  2.53*   HGB 9.5* 10.7* 5.0*   HCT 28.9*  --  16.8*   MCV 92  --  66*   MCH 30.3  --  19.8*   MCHC 32.9  --  29.8*   RDW 17.0*  --  20.3*   PLT 53* 98* 283     INR  Recent Labs   Lab 12/20/19  0340 12/20/19  0219 12/19/19  1909   INR 1.32* 1.74* 1.34*      Arterial Blood Gas  Recent Labs   Lab 12/20/19  0340   PH 7.21*   PCO2 24*   PO2 227*   HCO3 10*   O2PER Ventilator       Recent Results (from the past 24 hour(s))   XR Chest Port 1 View    Narrative    EXAM: XR CHEST PORT 1 VW  LOCATION: [facility]  DATE/TIME: 12/19/2019 7:21 PM    INDICATION: Melena  COMPARISON: None.      Impression    IMPRESSION: Chronic rotator cuff arthropathy in the shoulders. Normal heart size. Patient is rotated to left. Lungs clear.   XR Chest Port 1 View    Narrative    EXAM: XR CHEST PORT 1 VW  LOCATION: [facility]  DATE/TIME: 12/19/2019 8:52 PM    INDICATION: Nausea.  COMPARISON: None.      Impression    IMPRESSION: Patient is rotated to the left. Right IJ venous catheter with tip in the SVC. Normal heart size. No pulmonary infiltrates.   XR Chest Port 1 View    Narrative    EXAM: XR CHEST PORT 1 VW  LOCATION: [facility]  DATE/TIME: 12/20/2019 3:21 AM    INDICATION: ETT placement  COMPARISON: 12/19/2019 at 8:36 PM      Impression    IMPRESSION: New ETT with tip in the mid trachea. Otherwise no change. No new infiltrates. Normal heart size. Right IJ line tip in SVC.   XR Abdomen Port 1 View    Narrative    EXAM: XR ABDOMEN PORT 1 VW  LOCATION: [facility]  DATE/TIME: 12/20/2019 3:37 AM    INDICATION: Post OG placement.    COMPARISON: None.      Impression    IMPRESSION: Enteric tube tip in the stomach.           Assessment and Plan:    Neuro/Psych:  ### MS -- unclear history, unclear if on meds, unable to verify as no family present and patient is intubated/sedated  ### sedation/analgesia  -versed gtt ordered for sedation given profound hypotension despite high dose pressors     Respiratory  ### Acute hypoxic respiratory failure  -intubated, mechanically ventilated, continue support    CV:  ### Hemorrhagic Shock 2/2 GI hemorrhage  -Massive Transfusion Protocol activated  -Levophed and vasopressin to keep MAP > 65; consider adding  epinephrine gtt if necessary    GI:  ### GI hemorrhage -- suspected 2/2 bleeding ulcer  -GI and general surgery consulted  -In IR now for embolization  -continue protonix and octreotide drips    Heme:  ### Anemia -- likely mixed anemia of chronic disease and acute blood loss  ### Coagulopathy -- INR 1.67  -Getting massive transfusion protocol  -transfuse for hgb < 7  -received TXA at [facility]    Renal:  ### WOLFGANG -- crt up to 2.26; suspect prerenal etiology  ### Hypernatremia -- suspect 2/2 sodium bicarb received  -monitor creatinine and UOP  -renally dose meds, avoid nephrotoxins as able    FEN:  -NPO  -Monitor lytes, replete prn    Prophy:  GI: PPI  DVT: SCDs    ICU Cares:  Restrain needed:  Not currently  Lines needed: Yes    Code: Full  Dispo: ICU Status  Family: son finally reached (Hong Konger speaking) he is in [place] and will try to get back by tonight -- he will work on getting in touch with other family      Billing: Critical care time 45 min excluding procedure time.      Dejah Hansen MD  Pulmonary and Critical Care Medicine

## 2019-12-20 NOTE — PLAN OF CARE
Pt intubated on vent, sedated on propofol to meet goal of RASS -1 to 0.  Pt wakes, nods appropriately to some questions, follows commands intermittently.  Appears to understand some English, but is primarily Martiniquais speaking.  Pt with baseline contracture to upper & lower extremities, does move hands intentionally toward tubes.  Pt restrained for protection of life saving devices.  Dilaudid given for grimacing & nodding yes to having pain, pt appears restful at this time.      Pt VSS, sinus rhythm 80's.  Generally cool & pale but improving from start of shift.      Tolerates CMV, no wean performed due to instability.    No further s/s of bleeding noted, Hgb 10.2, OG to LIS with scant dark bloody drainage since return from IR.  L groin site dressing CDI, LLE CMS wnl.    I have spoken with pt's nephew Ryder by phone and updated him to pt's plan of care.  Son is returning to [place] by plane at this time, expected this evening.

## 2019-12-20 NOTE — PLAN OF CARE
ICU End of Shift Summary.  For vital signs and complete assessments, please see documentation flowsheets.     Pertinent assessments: Turkmen speaking only. Unable to reach Turkmen . Afebrile, bear hugger in place to warm patient, extremities cool, pt total cares - contracted extremities, Tele ST, LS coarse, two bloody loose bms.   Major Shift Events: Admitted to unit at 0015. Pressors started to try to keep MAP >65. Massive transfusion protocol initiated at 0135, pt intubated at 0200, hagan placed at 0230, OG placed at 0300. Pt coded, received two amps of epi, two amps bicarb, calcium chloride, four units of blood. Pt stabilized and transported to [facility] with two units of blood, two plasma and one platelet. Report given to bedside nurse at [facility].   Plan (Upcoming Events): IR when pt arrives at [facility]?   Discharge/Transfer Needs: Transferred to [facility] - pt left unit at 0500 with transport. ICU RN at [facility] notified.     Bedside Shift Report Completed : y  Bedside Safety Check Completed: sue

## 2019-12-20 NOTE — PROCEDURES
The patient is intubated and in shock.  Family can not be reached the right radial art line was placed an emergency life saving line while waiting for the transport.  The patient has contractures and the US was used to find the right radial artery.  Sterile technique and 1% lidocaine was used to place the a-line  Right radial artery accessed on the first attempt.  The wive and the catheter passed but the passage was tight.  Intermittent good tracing seen on the monitor.  Flattening of the wave form after blood draws.  The line was secured with 3-0 silk.  I sterile dressing was applied. The patient tolerated the procedure well. No complications noted.

## 2019-12-20 NOTE — H&P
[facility]    History and Physical - Hospitalist Service       Date of Admission:  12/19/2019    Assessment & Plan   Eliza Thurman is a 59 year old female admitted on 12/19/2019. She has a past medical history reported to be significant for arthritis, chronic bedridden state, previous gastric or duodenal ulcers, and recent pneumonia.  She was brought to the hospital by family due to concerns for appearance of dark blood in stools.  No family is currently with the patient.  She is [place]al[place]-speaking only.  She is quite lethargic at this time.  We are unable to get a [place]alander  on  phone currently.  All information available at this time was from discussion with emergency room physician who did speak with family earlier when they were still present.    1.  Acute GI bleed.  Currently hemodynamically unstable as evidenced by severe hypotension, tachycardia, appearance of lethargy, and quite cool extremities.  Initiating massive transfusion protocol.  Continue IV pantoprazole.  Start IV octreotide.  Start IV Levophed.  N.p.o. diet status.  I did rediscuss the case with gastroenterology, Dr. Acosta.  She does feel that the patient likely needs to have fairly emergent upper GI endoscopy.  Dr. Acosta also would like patient to be intubated as she will need airway protection during emergent EGD.  Also likely needs to be at a facility with interventional radiology available immediately.  Plan to monitor hemoglobin frequently.  I also spoke with intensivist, Dr. Hansen.    2.  Possible acute kidney injury.  Unknown creatinine baseline.  Creatinine currently 3.27.  If this is acute kidney injury, likely due to severe hypotension with acute GI bleed and acute blood loss anemia.  Avoid nephrotoxins as able.  Continuous IV fluids.  Massive transfusion protocol as noted above.  Continue to monitor.    3.  Acute blood loss anemia.  Due to suspected acute upper GI bleed as noted above.   Initiating massive transfusion protocol.  Continue IV pantoprazole.  Start IV octreotide.  I did discuss case again with gastroenterology.     Diet: NPO for Medical/Clinical Reasons Except for: No Exceptions    DVT Prophylaxis: Pneumatic Compression Devices  Swain Catheter: not present  Code Status: Full Code      Disposition Plan   Expected discharge: Currently and intensive care unit.  Likely needs transfer to  Entered: Deep Salomon DO 12/20/2019, 1:21 AM     The patient's care was discussed with the emergency room physician, gastroenterologist, and intensivist.    I have spent 75 minutes of critical care time with the patient and working on direct patient care so far today.    Deep Salomon, DO  [facility]    ______________________________________________________________________    Chief Complaint   Blood in stools.    History is obtained from the patient and emergency department physician    History of Present Illness   Eliza Thurman is a 59 year old female who has a past medical history reported to be significant for arthritis, chronic bedridden state, previous gastric or duodenal ulcers, and recent pneumonia.  She was brought to the hospital by family due to concerns for appearance of dark blood in stools.  No family is currently with the patient.  She is Kosovan-speaking only.  She is quite lethargic at this time.  We are unable to get a Kosovan  on  phone currently.  All information available at this time was from discussion with emergency room physician who did speak with family earlier when they were still present.      Review of Systems    Review of systems not obtained due to patient factors - language barrier    Past Medical History    I have reviewed this patient's medical history and updated it with pertinent information if needed.   No past medical history on file.    Past Surgical History   I have reviewed this patient's surgical history and  updated it with pertinent information if needed.  No past surgical history on file.    Social History   I have reviewed this patient's social history and updated it with pertinent information if needed.  Per report from emergency room physician, she does not smoke or drink alcohol.  I am currently unable to confirm this with the patient due to language barrier.    Family History   I have reviewed this patient's family history and updated it with pertinent information if needed.   No family history on file.   Unable to obtain family history due to language barrier.    Prior to Admission Medications   None     Allergies   Allergies   Allergen Reactions     No Clinical Screening - See Comments      Unknown medication from the [facility]. Cause hives       Physical Exam   Vital Signs: Temp: 96.2  F (35.7  C) Temp src: Axillary BP: (!) 62/47 Pulse: 111 Heart Rate: 114 Resp: 18 SpO2: 100 % O2 Device: Oxymask Oxygen Delivery: 5 LPM  Weight: 91 lbs 7.85 oz    Gen: Appears lethargic.  We currently do not have a [place] Zealander  available.  She does seem to attempt to mumble some words.  Eyes:  PERRL, sclera anicteric.  OP:  MMM, no lesions.  Neck:  Supple.  CV:  Regular, tachycardic, no murmurs.  Lung:  CTA b/l, normal effort.  Ab:  +BS, soft.  Skin: Quite cool, dry to touch.  No rash.  Ext:  No pitting edema LE b/l.      Data   Data reviewed today: I reviewed all medications, new labs and imaging results over the last 24 hours. I personally reviewed the EKG tracing showing Sinus rhythm.    Recent Labs   Lab 12/19/19  1909   WBC 9.0   HGB 5.0*   MCV 66*      INR 1.34*      POTASSIUM 5.3   CHLORIDE 116*   CO2 13*   *   CR 3.27*   ANIONGAP 13   MORGAN 7.3*   *   ALBUMIN 2.0*   PROTTOTAL 5.2*   BILITOTAL 0.2   ALKPHOS 81   ALT 24   AST 33   LIPASE 209   TROPI 0.043

## 2019-12-20 NOTE — PROGRESS NOTES
Spoke with gastroenterologist, Dr. Acosta again.    She asking the patient get IV Reglan 10 mg once now.  This is ordered.

## 2019-12-20 NOTE — PROGRESS NOTES
Pt VSS and IR procedure completed with successful coil & embolization. No further active bleeding noted. MTP stopped at 0825 & blood bank notified.  Pt has received the following since coming to [facility] ICU with no s/s of reaction observed.    3 units cryoprecipitate  2 units platelets  2 units plasma  3 units PRBC's     It was reported from [facility] that pt had received   10 units PRBC's   4 units plasma  1 unit cryo  1 unit platelet    EMS has reported pt received   2 units PRBC,   1 unit plama,   1 unit platelet

## 2019-12-20 NOTE — DISCHARGE SUMMARY
Blood pressure currently improved with massive blood transfusion protocol currently running, Levophed, and vasopressin.    Intensivist, Dr. Pendleton, currently at bedside.  He does feel the patient is currently hemodynamically stable enough to transfer emergently to [facility].    Plan is for emergent transfer to [facility] at this time.  Accepting physician at [facility] is Dr. Lutz.    I did speak with Dr. Acosta of gastroenterology again to update her that patient is now being prepared to transfer to [facility].

## 2019-12-20 NOTE — PROGRESS NOTES
4 units of blood currently being infused as fast as possible with massive blood transfusion protocol.    Has now been intubated by anesthesiology.    Start Versed drip for sedation.    Blood pressure has mildly improved to most recent pressure of 75/48.

## 2019-12-20 NOTE — PROGRESS NOTES
[facility] Addendum    We set up for EGD this afternoon to get endoscopic evaluation of massive UGI bleed before extubation even though bleeding has stopped.  This could be helpful to define the bleeding and to assess for any chance for endoscopic therapy if she rebleeds.  Hemoglobin is 10, off pressors, so no active bleeding now.      Called nephew for phone consent but he did not give consent, wants the son to give consent.  Son is on a plane and does not speak English and will not arrive until 7pm tonight.     Will hold off on EGD, would not do it electively over the weekend but could consider it if she rebleeds alghough rebleeding likely requires surgery, see IR note.  Otherwise would consider EGD right before discharge.  Can extubate when ready, don't need to hold off for EGD.  Probable EGD next week sometime.      Reece Sanders MD   Cell [phone]  After 5 PM, please call [phone]

## 2019-12-20 NOTE — ED PROVIDER NOTES
History     Chief Complaint:  Melena      HPI   Eliza Thurman is a 59 year old female bedridden, who presents with melena. Per EMS, the patient's daughter had called EMS due to dark black/bloody stool and emesis. She noted that she has little kids and will be at home but that the patient's son is out of town. Per interpretor, the patient states that she had pneumonia 2 weeks ago and that she had stopped taking her antibiotics for the past 4 days and that ever since she started taking the antibiotics she has had generalized weakness and dry mouth. Here, she notes that she is nauseous, and has crampy to sharp abdominal pain. She denies use of blood thinners, alcohol, drugs, or that she smokes.     Allergies:  Unknown drug allergies     Medications:    Doxycycline   Zofran  Augmentin  Albuterol  Mucinex    Past Medical History:    Ulcers  Arthritis  Pneumonia     Past Surgical History:    The patient does not have any pertinent past surgical history.     Family History:    No past pertinent family history.     Social History:  Smoking status: No  Alcohol use: No  Drug use: No  PCP: Physician No Ref-Primary  Marital Status:         Review of Systems   Gastrointestinal: Positive for blood in stool and nausea.        Black blood stool and emesis     Neurological: Positive for weakness.   All other systems reviewed and are negative.        Physical Exam     Patient Vitals for the past 24 hrs:   BP Temp Temp src Pulse Heart Rate Resp SpO2   12/19/19 2254 (!) 76/48 -- -- 121 117 27 92 %   12/19/19 2247 -- -- -- -- 110 17 (!) 88 %   12/19/19 2242 (!) 85/58 -- -- 105 105 11 95 %   12/19/19 2235 100/56 -- -- 108 107 14 96 %   12/19/19 2230 101/61 -- -- 108 103 15 95 %   12/19/19 2225 104/63 -- -- 102 102 14 90 %   12/19/19 2215 (!) 117/96 -- -- 109 106 20 94 %   12/19/19 2210 99/64 -- -- 107 106 10 95 %   12/19/19 2205 103/64 -- -- 105 105 15 95 %   12/19/19 2200 (!) 86/64 -- -- 105 105 16 97 %   12/19/19 2155 98/66 -- --  105 108 15 97 %   12/19/19 2150 96/60 -- -- 110 109 20 94 %   12/19/19 2145 99/61 -- -- 103 101 15 97 %   12/19/19 2140 106/67 -- -- 99 95 15 96 %   12/19/19 2135 120/66 -- -- 91 93 10 96 %   12/19/19 2130 115/63 -- -- 92 90 15 98 %   12/19/19 2125 114/63 -- -- 93 89 12 96 %   12/19/19 2120 114/63 -- -- 88 89 8 97 %   12/19/19 2115 113/69 -- -- 92 92 12 96 %   12/19/19 2110 119/72 -- -- 93 98 14 97 %   12/19/19 2105 -- -- -- -- 89 12 98 %   12/19/19 2100 111/89 -- -- 96 92 13 96 %   12/19/19 2055 112/69 -- -- 88 90 13 99 %   12/19/19 2050 112/73 -- -- 89 92 13 99 %   12/19/19 2045 112/78 -- -- 88 92 13 99 %   12/19/19 2040 108/67 -- -- 90 91 10 98 %   12/19/19 2035 105/62 -- -- 96 96 14 --   12/19/19 2030 116/59 -- -- 93 96 13 100 %   12/19/19 2025 110/61 -- -- 92 95 15 100 %   12/19/19 2020 101/60 -- -- 97 94 13 100 %   12/19/19 2015 104/59 -- -- 96 93 19 100 %   12/19/19 2010 98/55 -- -- 103 103 11 95 %   12/19/19 2009 -- -- -- -- 110 17 --   12/19/19 2008 (!) 87/51 -- -- -- -- -- --   12/19/19 2000 (!) 51/33 -- -- 100 130 23 98 %   12/19/19 1955 (!) 86/57 -- -- 122 123 14 100 %   12/19/19 1950 104/57 -- -- 110 105 17 (!) 85 %   12/19/19 1935 105/63 -- -- 101 100 13 (!) 78 %   12/19/19 1930 101/64 -- -- 100 104 16 (!) 76 %   12/19/19 1925 106/67 -- -- 110 102 14 92 %   12/19/19 1920 104/59 -- -- 103 106 14 100 %   12/19/19 1915 107/62 -- -- 104 103 18 100 %   12/19/19 1910 99/61 -- -- 104 105 15 99 %   12/19/19 1905 101/63 -- -- 111 112 15 92 %   12/19/19 1900 93/60 -- -- 111 116 20 98 %   12/19/19 1850 94/56 -- -- 109 -- -- 100 %   12/19/19 1845 95/58 -- -- 115 -- -- (!) 85 %   12/19/19 1840 97/57 -- -- 113 -- -- 100 %   12/19/19 1835 96/54 -- -- 118 -- -- 100 %   12/19/19 1830 97/58 -- -- 117 -- -- 100 %   12/19/19 1825 97/59 -- -- 125 -- -- 100 %   12/19/19 1820 92/47 -- -- 119 -- -- 100 %   12/19/19 1800 97/58 -- -- -- -- -- 100 %   12/19/19 1758 97/58 97.8  F (36.6  C) Oral 111 -- 18 100 %        Physical  Exam  General: The patient is alert, in no respiratory distress.    HENT: Mucous membranes moist.    Cardiovascular: Regular rate and rhythm. Good pulses in all four extremities. Normal capillary refill and skin turgor.     Respiratory: Lungs are clear. No nasal flaring. No retractions. No wheezing, no crackles.    Gastrointestinal: Abdomen soft. No guarding, no rebound. No palpable hernias. No abdominal tenderness.    Musculoskeletal: No gross deformity. Generalized weakness. Weakness of lower extremities but can move her toes.     Skin: No rashes or petechiae. Pallor.     Neurologic: The patient is alert and oriented x3. GCS 15. No testable cranial nerve deficit. Follows commands with clear and appropriate speech. Gives appropriate answers. Good strength in all extremities. No gross neurologic deficit. Gross sensation intact. Pupils are round and reactive. No meningismus.     Lymphatic: No cervical adenopathy. No lower extremity swelling.    Psychiatric: The patient is non-tearful.      Emergency Department Course     ECG:  ECG taken at 1836, ECG read at 1841  Sinus Tachycardia  Right atrial enlargement  Nonspecific T wave abnormality  Abnormal ECG  Rate 117 bpm. KS interval 152 ms. QRS duration 72 ms. QT/QTc 322/449 ms. P-R-T axes 86 58 91.    ECG:  ECG taken at 2133, ECG read at 2139  Normal sinus rhythm  Normal ECG  Rate 92 bpm. KS interval 174 ms. QRS duration 76 ms. QT/QTc 382/472 ms. P-R-T axes 86 40 90.     Imaging:  Radiology findings were communicated with the patient who voiced understanding of the findings.    Chest  XR PORT:  Chronic rotator cuff arthropathy in the shoulders. Normal heart size. Patient is rotated to left. Lungs clear.    Reading per radiology    Laboratory:  Laboratory findings were communicated with the patient who voiced understanding of the findings.    CBC: WBC 9.0, HGB 5.0(LL),   CMP: Chloride 116(H), Carbon dioxide 13(L), Glucose 105(H), (H), GFR 15(L), Calcium  7.3(L), Albumin 2.0(L), Protein total 5.2(L), Creatinine 3.27(H)  INR: 1.34(H)  Troponin (Collected 1941): 0.043   Lipase: 209  Lactic acid whole blood: 1.3  ABO/Rh type and screen: A; Rh positive, negative antibody  Blood component  Alcohol level blood: <0.01    [facility]    -Central Line  Date/Time: 12/19/2019 8:02 PM  Performed by: Anup Peralta MD  Authorized by: Anup Peralta MD        ANESTHESIA    Anesthesia: Local infiltration  Local Anesthetic:  Lidocaine 1% without epinephrine      PRE-PROCEDURE DETAILS:     Hand hygiene: Hand hygiene performed prior to insertion      Sterile barrier technique: All elements of maximal sterile technique followed      Skin preparation:  ChloraPrep    Skin preparation agent: Skin preparation agent completely dried prior to procedure      PROCEDURE DETAILS:     Patient position:  Trendelenburg    Procedural supplies:  Cordis    Landmarks identified: yes      Ultrasound guidance: yes      Sterile ultrasound techniques: Sterile gel and sterile probe covers were used      Number of attempts:  1    Successful placement: yes      POST PROCEDURE DETAILS:      Post-procedure:  Dressing applied and line sutured    Assessment:  Blood return through all ports, no pneumothorax on x-ray, free fluid flow and placement verified by x-ray      Interventions:  1846 NS 1L IV Bolus lactated  1953 Pantoprazole, 80 mg, IV   2033 Pantoprazole infusion, 8 mg/ hour, IV infusion  2220 NS 1L IV Bolus     Emergency Department Course:   Nursing notes and vitals reviewed.    1801 I performed an exam of the patient as documented above.     1840 EKG was performed, see results above.     1849 IV was inserted and blood was drawn for laboratory testing, results above.     1934 I updated and checked on the patient. The patient became hypotensive after emesis and was moved to be intubated but her sat levels increased.    1958 I performed central line placement, see notes above.      2052 The patient had a portable Chest XR while in the emergency department, results above.     2138 EKG was performed, see results above.     2150 I spoke with Dr. Acosta of the Gastroenterologist service from [facility] regarding patient's presentation, findings, and plan of care.     2205  I spoke to Dr. Salomon of the hospitalist service who accepts the patient for admission.     2210 I personally reviewed the results with the patient and answered all related questions prior to admission.    2253 I checked on the patient again. Patient is mentating good.       Impression & Plan      Medical Decision Making:  Eliza Thurman is a 59 year old female who presents to the emergency department today for evaluation of hypotension and GI bleed.  The patient was a [place]an-speaking but was interviewed via the .  She reports that she is not any current nonsteroidals or steroid but in the past has had a duodenal ulcer with bleeding.  The patient reports tarry stools responded nicely to IV fluids here.  She did appear quite pale and I was concerned about bleeding while I was consenting her for transfusion she suddenly had a episode of hypotension.  She had decreased level of consciousness with unable to talk her blood pressure was in the 60s I emergently moved her to another room to prepare for intubation and a rapid response team was called.  Patient however was started on bolus of fluid as a prep for intubation and her mental status did improve with slight improvement of her pressure.  I then placed an emergent right IJ central line to allow for pressors and/or fluid and that she had 20-gauge IVs.  The patient however during this time her pressure came up blood was ordered and started and the patient did improve.  She was observed in the ER and rechecked frequently I discussed the case with GI who was gracious and said they would come in if she became unstable.  She did have another episode of  unresponsiveness with low blood pressure in the 50s were second rapid response team was called as a prep for intubation.  Her pressures came up with IV fluid at that time she had a bloody stool.  And likely this was a vasovagal episode.  The patient was ordered more blood and blood was kept on hand.  She is on a I H2 blocker drip was currently stable was [facility] hospital in good condition    Diagnosis:    ICD-10-CM    1. Gastrointestinal hemorrhage associated with duodenal ulcer K26.4 Blood culture ONE site   2. Other specified hypotension I95.89    3. Renal insufficiency N28.9        Disposition:   The patient is admitted into the care of Dr. Salomon.     Critical Care time was 130 minutes for this patient excluding procedures.     Scribe Disclosure:  I, Rachel Abdi, am serving as a scribe at 1801 on 12/19/2019 to document services personally performed by Anup Peralta MD based on my observations and the provider's statements to me.  [facility] EMERGENCY DEPARTMENT       Anup Peralta MD  12/20/19 0104

## 2019-12-21 NOTE — PROGRESS NOTES
Extubation Note    Successful completion of SBT: yes   Extubation time: 1030    Patient assessment:  Lung sounds: clear   Stridor Present: No  Patient tolerance:  Good     Oxygen device:  Liter flow: 10   FiO2: 40  SpO2: 97%

## 2019-12-21 NOTE — PROGRESS NOTES
GI FOLLOW UP      S: pt extubated today.  No  or family was available at the time of my visit.  She denies any pain.  She is not fully oriented and we were not able to have a meaningful conversation.    O:/60   Pulse 96   Temp 96.8  F (36  C)   Resp 18   Wt 49 kg (108 lb 0.4 oz)   SpO2 97%   GEN: NAD  HEENT; No scleral icterus  CV RRR  PULM: normal work of breathing  Abd: deferred    Labs: reviewed. Hgb stable.    A/P: 58 yo F presented with upper GIB requiring embolization of her GDA now off pressors and extubated. Clinically improving.  -continue supportive treatment and monitoring for bleeding.  -recommend eventual EGD to assess for possible underlying pathology.  -complete PPI gtt for 72 hrs and then transition to BID dosing  -ok to have CLD if she remains clinically stable.  -I will come again tomorrow and try to speak with her family as well.    Vicky Camp MD  [facility].

## 2019-12-21 NOTE — PROGRESS NOTES
[facility] ICU RESPIRATORY NOTE  Date of Admission: 12/20/2019  Date of Intubation (most recent):12/20/2019  Reason for Mechanical Ventilation: Airway Protection   Number of Days on Mechanical Ventilation:2  Met Criteria for Pressure Support Trial: No  Reason for No Pressure Support Trial: per MD    Ventilation Mode: CMV/AC  (Continuous Mandatory Ventilation/ Assist Control)  FiO2 (%): 30 %  Rate Set (breaths/minute): 16 breaths/min  Tidal Volume Set (mL): 450 mL  PEEP (cm H2O): 5 cmH2O  Oxygen Concentration (%): 30 %  Resp: 16    Recent Labs   Lab 12/20/19  1225 12/20/19  0340   PH  --  7.21*   PCO2  --  24*   PO2  --  227*   HCO3  --  10*   O2PER 40% FI02 VENT Ventilator       Plan:  Continue full vent support tonight

## 2019-12-21 NOTE — PROGRESS NOTES
[id number]  Neuro: awake and calm with propofol. Following commands. Able to write in English. Situation simply explained to her and pt understood.   Pulm: on full vent support. LS diminished  CV: SR w/ WNL BPs  : Swain patent with adequate UOP  GI: hyperactive BS. No BM. OG on LIS  Extremities: purposeful movements on BUE. Contracted BLE, unable to extend.   Skin: redness on nataly-area. L groin procedural site CDI  Lines: R internal jugular central lines. PIVx2 on RUE.  Family: No family at present. Expecting son to arrive this evening. Interpretor scheduled  Plan: monitor and rest pt overnight.

## 2019-12-21 NOTE — PLAN OF CARE
Sedated on propofol.  Following commands.  LS coarse/dim.  CMV 30% 14/450/5.  SR  OG to LIS, 100 ml output over 8 hrs.  Hyperactive BS.  Large maroon liquid stools.  Flexiseal placed per VORB from night intensivist.  UOP adequate.  Denies pain.  Coccyx blanchable redness.  HGB stable.  Plan: monitor kidney function.

## 2019-12-21 NOTE — PROGRESS NOTES
Pt extubated to 40% Cool aersol at 1030. No stridor present, oxygen saturation in upper 90s. LS clear. Will continue to follow.    Topher Pulido, RT on 12/21/2019 at 10:50 AM

## 2019-12-21 NOTE — PROGRESS NOTES
Critical Care Progress Note      12/21/2019    Name: Eliza Thurman MRN#: [id number]   Age: 59 year old YOB: 1960     Hsptl Day# 1  ICU DAY #1    MV DAY #1             Problem List:   Active Problems:    Hemorrhagic shock (H)  upper GI bleeding  RONALD  Lactic acidosis         Summary/Hospital Course:     59F Chinese speaking with pmh of MS and gastric ulcer disease now presented to OSH with melena and emesis.  She was very unstable prior to transfer and apparently cardiac arrested just prior to transport.  On arrival here she was taken immediately to IR where embolization was performed and hemostasis was apparently achieved.    On arrival back to the ICU she was awake and following commands.  She was sedated and kept intubated for one more day to ensure stability, but she was extubated without difficulty on 12/21.      Assessment and plan :     Eliza Thurman IS a 59 year old female admitted on 12/20/2019 for upper GI bleeding.   I have personally reviewed the daily labs, imaging studies, cultures and discussed the case with referring physician and consulting physicians.   My assessment and plan by system for this patient is as follows:    Neurology/Psychiatry:   1. H/o advanced MS  2. Analgesia:  Prn dilaudid  3. Sedation:  Propofol.  Prn midazolam  4. S/p cardiac arrest:  Mental status preserved.  No hypothermia needed.    Cardiovascular:   1  Hemorrhagic shock:  Resolved.  Now off pressors.    Pulmonary/Ventilator Management:   1. Successfully extubated this morning.  Weaning down fio2 support.    GI and Nutrition:   1. NPO for today.  Swallow eval on post-extubation day 1.  2. Upper GI bleeding:  Appreciate GI and IR efforts.  Embolization by IR believed successful.  Continue protonix drip x72 hours.  Stop octreotide drip today. Gen surg following peripherally.     Renal/Fluids/Electrolytes:   1. Ronald:  Creat downtrended to 2.17 then bumped to 2.60 in setting of IV contrast.  Acceptable UOP.   Continue to trend.  2.  Hypernatremia:  To 151.  Changing 0.9%/d5 to just d5 today.     Infectious Disease:   1. No acute issues    Endocrine:   1. Hyperglycemia:  Likely reactive.  Continue insulin drip.    Hematology/Oncology:   1. Wbc 21 elevated; suspect reactive, no signs/sxs of infection  2.  hgb 10.6 --  Has been very stable.    3.  pltlts 119 --  Acceptable.  4.  INR 1.34 -- acceptable  5.  Fibrinogen 297 -- acceptable    IV/Access:   1. Venous access - central line    ICU Prophylaxis:   1. DVT: mech  2. VAP: HOB 30 degrees, chlorhexidine rinse  3. Stress Ulcer: PPI drip  4. Restraints: Nonviolent soft two point restraints required and necessary for patient safety and continued cares and good effect as patient continues to pull at necessary lines, tubes despite education and distraction. Will readdress daily.   5. Wound care - per unit routine   6. Feeding - npo for today  7. Family Update: pt herself and son at bedside   8. Disposition - icu           Interim History:     No events.  Hemoglobins have remained stable.  On my visit (via [place] ) this morning she is without complaint, denies nausea, vomiting, abd pain, dizzyness and lightheadedness.         Key Medications:       chlorhexidine  15 mL Mouth/Throat Q12H       IV fluid REPLACEMENT ONLY       D5W       insulin (regular) Stopped (12/20/19 1400)     pantoprazole (PROTONIX) infusion ADULT/PEDS GREATER than or EQUAL to 45 kg 8 mg/hr (12/21/19 0800)               Physical Examination:   Temp:  [97.5  F (36.4  C)-99.5  F (37.5  C)] 98.2  F (36.8  C)  Pulse:  [] 97  Heart Rate:  [] 96  Resp:  [9-24] 15  BP: ()/(50-76) 104/59  FiO2 (%):  [30 %-40 %] 30 %  SpO2:  [96 %-100 %] 99 %        Intake/Output Summary (Last 24 hours) at 12/21/2019 1215  Last data filed at 12/21/2019 1000  Gross per 24 hour   Intake 1073.85 ml   Output 1375 ml   Net -301.15 ml         Wt Readings from Last 4 Encounters:   12/21/19 49 kg (108 lb 0.4 oz)    12/20/19 41.5 kg (91 lb 7.9 oz)     BP - Mean:  [] 74  Ventilation Mode: CPAP/PS  (Continuous positive airway pressure with Pressure Support)  FiO2 (%): 30 %  Rate Set (breaths/minute): 16 breaths/min  Tidal Volume Set (mL): 450 mL  PEEP (cm H2O): 5 cmH2O  Pressure Support (cm H2O): 5 cmH2O  Oxygen Concentration (%): 30 %  Resp: 15    Recent Labs   Lab 12/20/19  1225 12/20/19  0340   PH  --  7.21*   PCO2  --  24*   PO2  --  227*   HCO3  --  10*   O2PER 40% FI02 VENT Ventilator       GEN: no acute distress, pleasant  HEENT: head ncat, sclera anicteric, OP patent, trachea midline   NECK: supple  PULM: unlabored synchronous with vent, clear anteriorly    CV/COR: RRR S1S2 no gallop,  No rub, no murmur  ABD: soft nontender, hypoactive bowel sounds, no mass  EXT:  No Edema,   Warm x4  NEURO: awake, alert, oriented.  Apparently averbal at baseline but comminucates through writing.  Limited motor strength at baseline due to underling MS -- present condition seems consistent with baseline.  SKIN: no obvious rash  LINES: clean, dry intact         Data:   All data and imaging reviewed     ROUTINE ICU LABS (Last four results)  CMP  Recent Labs   Lab 12/21/19  0400 12/20/19  0935 12/20/19  0555 12/20/19  0340  12/19/19  1909   * 148* 149* 146*  --  142   POTASSIUM 4.7 4.3 4.9 5.2   < > 5.3   CHLORIDE 123* 121* 123* 121*  --  116*   CO2 18* 16* 14* 11*  --  13*   ANIONGAP 10 11 12 14  --  13   GLC 99 172* 253* 216*  --  105*   * 98* 104* 130*  --  147*   CR 2.60* 2.17* 2.26* 2.49*  --  3.27*   GFRESTIMATED 19* 24* 23* 20*  --  15*   GFRESTBLACK 22* 28* 27* 24*  --  17*   MORGAN 7.5* 8.6 8.1* 6.5*  --  7.3*   MAG 2.0 1.4*  --   --   --   --    PHOS 5.8* 7.8*  --   --   --   --    PROTTOTAL  --  4.5* 3.9* 4.3*  --  5.2*   ALBUMIN  --  2.3* 1.8* 1.8*  --  2.0*   BILITOTAL  --  0.8 0.5 0.4  --  0.2   ALKPHOS  --  45 42 43  --  81   AST  --  39 28 22  --  33   ALT  --  23 22 17  --  24    < > = values in this  interval not displayed.     CBC  Recent Labs   Lab 12/21/19  0400 12/21/19  0030 12/20/19  1814 12/20/19  1612   WBC 21.3* 20.0* 20.0* 15.5*   RBC 3.56* 3.46* 3.47* 3.42*   HGB 10.6* 10.4* 10.5* 10.3*   HCT 30.7* 29.8* 29.7* 29.3*   MCV 86 86 86 86   MCH 29.8 30.1 30.3 30.1   MCHC 34.5 34.9 35.4 35.2   RDW 15.0 14.9 14.5 14.6   * 118* 119* 120*     INR  Recent Labs   Lab 12/21/19  0400 12/21/19  0030 12/20/19  1814 12/20/19  1225   INR 1.34* 1.31* 1.34* 1.30*     Arterial Blood Gas  Recent Labs   Lab 12/20/19  1225 12/20/19  0340   PH  --  7.21*   PCO2  --  24*   PO2  --  227*   HCO3  --  10*   O2PER 40% FI02 VENT Ventilator       All cultures:  Recent Labs   Lab 12/20/19  0100 12/19/19  1908   CULT No growth after 20 hours No growth after 2 days     No results found for this or any previous visit (from the past 24 hour(s)).    Labs personally reviewed/interpreted:  See a/p

## 2019-12-21 NOTE — PROGRESS NOTES
Spontaneous Breathing Trial    Criteria met for SBT  yes    Settings:    PS: 5    PEEP: 5   FiO2 30    Measured Parameters:    RR: 13  Tidal volume: 579  RSBI:  22    Patient tolerance:  Well     Plan: to extubate.

## 2019-12-22 NOTE — PLAN OF CARE
DATE & TIME: 12/22/19 7395-3150             Cognitive Concerns/ Orientation : AOx4, [place] speaking   BEHAVIOR & AGGRESSION TOOL COLOR: green  CIWA SCORE:   ABNL VS/O2: VSS  MOBILITY: Hx of MS, total care, feeder, lift, LEs contracted, low activity tolerance   PAIN MANAGMENT: declines, Hx RA, states might needs it at bedtime  DIET: DD2/thin, vegetarian, feeder. No appetite and not eating   BOWEL/BLADDER: hagan and rectal tube (loose bloody BM)  ABNL LAB/BG: Hgb stable  DRAIN/DEVICES: hagan, rectal tube, R internal jugular, PIV  TELEMETRY RHYTHM: SR  SKIN: rectum redness, barrier applied with improvement  TESTS/PROCEDURES: f/u GI  D/C DAY/GOALS/PLACE: pending  OTHER IMPORTANT INFO:

## 2019-12-22 NOTE — PROGRESS NOTES
Eliza Thurman is transferring out of the ICU today. I received report from Dr. Larry. Chart reviewed. Eliza Thurman was seen and evaluated with an  this morning. She is feeling better. She did not have any questions or concerns for me. She did OK with a swallow study today and has been started on a diet.  Continue cares as previously ordered by Dr. Larry. Transfer orders placed. Recheck labs in AM.  Darinel Vasquez MD

## 2019-12-22 NOTE — PROGRESS NOTES
GI FOLLOW UP    S: pt seen with an .  Pt has no complaints. No abdominal pain.  Old dark blood is still seen in rectal tube but no evidence of new bleeding.    O;BP (!) 144/74   Pulse 89   Temp 97.9  F (36.6  C)   Resp 10   Wt 47.3 kg (104 lb 4.4 oz)   SpO2 96%   GEN: NAD  HEENT: no scleral icterus  CV: RRR  PULM:CTA  ABD:soft, NTND  NEURO: alert but not oriented to place    Labs reviewed. Hgb stable at 10.6.    A/P: 58 yo F with hx of arthritis, prior NSAID use (diclofenac) presented with melena, hypotension with evidence of hemorrhagic shock requiring massive transfusion protocol who ultimately underwent IR embolization of the GDA,      #GIB - passing old blood but clinically stable  -complete 72 hours PPI gtt and transition to BID PPI  -outpatient EGD to check for healing and rule out malignancy but I suspect her bleeding was due to an ulcer from NSAID use.  If clinically stable, this can be done in 1-2 months.   -No NSAIDs moving forward.  -I will order a serum H pylori.  Recommend treating if positive.    GI service will continue to follow.    Vicky Camp MD  [facility]    Addendum: no serum H pylori order available so I will order a stool test but it may be falsely negative due to being on a PPI so we will plan to take biopsies at the time of a follow up EGD.

## 2019-12-22 NOTE — PROGRESS NOTES
Clinical Swallow Evaluation:      12/22/19 1003   General Information   Onset Date 10/20/19   Start of Care Date 12/22/19   Referring Physician Dr. Larry   Patient Profile Review/OT: Additional Occupational Profile Info See Profile for full history and prior level of function   Patient/Family Goals Statement to drink water   Swallowing Evaluation Bedside swallow evaluation   Behaviorial Observations WFL (within functional limits)   Mode of current nutrition NPO   Respiratory Status Intubated on (date);Extubated on (date);Room air   Comments Eliza Thurman Is a 59 year old female admitted on 12/20/2019 for upper GI bleeding. Intubated 10/20/19-10/21/19. Hx of advanced MS. SLP for swallow eval.   Clinical Swallow Evaluation   Oral Musculature generally intact   Structural Abnormalities none present   Dentition present and adequate   Mucosal Quality dry   Mandibular Strength and Mobility intact   Oral Labial Strength and Mobility impaired retraction;impaired pursing   Lingual Strength and Mobility impaired right lateral movement  (mild)   Velar Elevation intact   Buccal Strength and Mobility intact   Laryngeal Function Cough;Swallow;Voicing initiated;Dry swallow palpated   Additional Documentation Yes   Clinical Swallow Eval: Thin Liquid Texture Trial   Mode of Presentation, Thin Liquids cup;straw   Volume of Liquid or Food Presented 8 oz   Oral Phase of Swallow WFL   Pharyngeal Phase of Swallow reduction in laryngeal movement   Diagnostic Statement no s/s noted   Clinical Swallow Eval: Nectar Thick Liquid Texture Trial   Mode of Presentation, Nectar cup;fed by clinician   Volume of Nectar Presented x1 sip   Oral Phase, [place] WFL   Pharyngeal Phase, Nectar reduction in laryngeal movement   Diagnostic Statement disliked and refused further, no s/s noted   Clinical Swallow Eval: Puree Solid Texture Trial   Mode of Presentation, Puree spoon;fed by clinician   Volume of Puree Presented ~ 1 oz   Oral Phase, Puree WFL    Pharyngeal Phase, Puree reduction in laryngeal movement   Diagnostic Statement no s/s noted   Clinical Swallow Eval: Solid Food Texture Trial   Mode of Presentation, Solid fed by clinician   Volume of Solid Food Presented x2 bites of very small pieces of sam cracker   Oral Phase, Solid Poor AP movement;Residue in oral cavity   Pharyngeal Phase, Solid reduction in laryngeal movement   Diagnostic Statement no s/s noted   General Therapy Interventions   Planned Therapy Interventions Dysphagia Treatment   Swallow Eval: Clinical Impressions   Skilled Criteria for Therapy Intervention Skilled criteria met.  Treatment indicated.   Functional Assessment Scale (FAS) 5   Treatment Diagnosis dysphagia   Diet texture recommendations Dysphagia diet level 2;Thin liquids   Recommended Feeding/Eating Techniques alternate between small bites and sips of food/liquid;maintain upright posture during/after eating for 30 mins;small sips/bites   Therapy Frequency 5x/week   Predicted Duration of Therapy Intervention (days/wks) 1 week   Anticipated Discharge Disposition   (TCU)   Risks and Benefits of Treatment have been explained. Yes   Patient, family and/or staff in agreement with Plan of Care Yes   Clinical Impression Comments Clinical swallow evaluation completed with in person  jeanie. Pt with hx of advanced MS but denies any difficulty swallowing or modifications to diet - no hx per chart review. She was intubated 12/20/19 to 10/21/19 (2 days total) and currently on RA. Her voice is mildly hoarse and mildly hypophonic and she reports a mild sore throat. Assessment completed with thin liquids, nectar thick liquids, puree solids, general solids. She currently presents with mild oropharyngeal dysphagia 2/2 weakness, recent intubation and positioning (refusing to sit up further than 45 degrees). Mastication with very small bites functional but requires liquid wash to achieve oral clearance. Hyolaryngeal ROM reduced but  consistently achieved to palpation. No overt signs/sx aspiration across all, stable SP02 and RR across trials.    Total Evaluation Time   Total Evaluation Time (Minutes) 39

## 2019-12-22 NOTE — PLAN OF CARE
The patient remains alert and confused to time. She has baseline weakness/contractures of both legs and her left arm. Right arm moves some 3/5 strength. Patient whispers/has a hoarse voice but makes sense and can be understood.   The patient has refused repositioning and cares all shift until 06:00 when she allowed RN and aide to change the pad and clean her nataly area.   Patient has pain with any movement/repositioning. Dilaudid 0.2mg given x1 dose for leg pain. Good relief of pain.   Swain in place. Draining clear yellow urine. Adequate output.   Rectal tube system remains in place. Draining maroon colored liquid stool. Minimal leaking around system.   Patient's skin remains intact, pale, despite patient's reluctance to reposition.   Blood sugar stable. Hemoglobin stable.   Sharan Hinds RN 6:30 AM 12/22/19

## 2019-12-22 NOTE — PROGRESS NOTES
12/22/19 1000   Quick Adds   Type of Visit Initial Occupational Therapy Evaluation   Living Environment   Lives With child(zhang), adult;other relative(s)  (son + dtr.-in-law)   Living Arrangements house   Transportation Anticipated family or friend will provide   Living Environment Comment Pt. has a tub/shower combo., difficulty answering queations regarding home environment, ?standard toilet, pt. uses bed pan, has an electric WC. Pt.'s son lifts pt. in WC when he is home, otherwise she stays in bed during the day.    Self-Care   Activity/Exercise/Self-Care Comment Pt. has not ambulated since 2006. Pt. is either in bed or up in WC, dep. for transfers(son lifts pt.); Pt. related via , that she can sit up in bed w/ assist--once sitting, able to sit on her own, able to roll in bed;pt. able to feed self using L hand(R -handed), can wash face and complete oral care, however, dtr.-in-law typically assists, needs assist to brush hair;dtr. in law bathes pt., son lifts on chair in bathtub;gets assist w/ dressing, toileting/bed pan use. Pt. has baseline UE + LE contractures, able to feed self and paint.    Functional Level   Ambulation 4-->completely dependent   Transferring 4-->completely dependent   Toileting 3-->assistive equipment and person  (able to roll for bedpan use)   Bathing 4-->completely dependent   Dressing 4-->completely dependent   Eating 0-->independent  (w/set-up)   Communication 0-->understands/communicates without difficulty   Fall history within last six months no   Which of the above functional risks had a recent onset or change? ambulation;transferring;toileting;bathing;dressing   General Information   Onset of Illness/Injury or Date of Surgery - Date 12/20/19   Referring Physician Dr. Larry   Patient/Family Goals Statement not stated   Additional Occupational Profile Info/Pertinent History of Current Problem OT:Eliza Thurman is a 59 year old female admitted on 12/20/2019 for upper GI  bleeding.  Per chart:pmh of MS and gastric ulcer disease now presented to OSH with melena and emesis.  She was very unstable prior to transfer and apparently cardiac arrested just prior to transport.  On arrival here she was taken immediately to IR where embolization was performed and hemostasis was apparently achieved.   Precautions/Limitations fall precautions   General Observations Pt. lying in  bed,  present, LE's flexed at knees/contracted,  L hand contracted.    (rectal tube, catheter)   General Info Comments Pt.dependent w/ ADL's, except does assist w/ some oral/facial hyg., feeds self w/ set-up, able to roll self in bed for toileting.   Cognitive Status Examination   Orientation orientation to person, place and time   Level of Consciousness alert   Cognitive Comment appears baseline, will monitor   Visual Perception   Visual Perception Comments no vision problems noted/reported   Sensory Examination   Sensory Comments R knee numbness reported   Pain Assessment   Patient Currently in Pain Yes, see Vital Sign flowsheet  (unable to rate;unable to tolerate any ROM/refuses to move)   Posture   Posture forward head position;protracted shoulders   Range of Motion (ROM)   ROM Comment RUE:demo. active shoulder flex. to grossly 60 degrees supine, unable to demo. elbow flexion, refused PROM;LUE:L hand contracted, able to demo. active shoulder flexion to grossly 40-50 degrees supine.   Strength   Strength Comments unable to complete/tolerate MMT;gen. weakness apparent   Hand Strength   Hand Strength Comments weak grasp B'ly   Coordination   Coordination Comments appears WFL   Mobility   Bed Mobility Comments refused rolling/bed mobility due to pain   Transfer Skill: Sit to Stand   Assistive Device for Transfer: Sit/Stand   (NT)   Toilet Transfer   Toilet Transfer Comments uses bed pan at home   Tub/Shower Transfer   Tub/Shower Transfer Comments tub/shower combo.--son lifts on to a chair, dtr.-in-law bathes  "  Balance   Balance Comments impaired   Upper Body Dressing   Level of [place]: Dress Upper Body dependent (less than 25% patients effort)   Lower Body Dressing   Level of [place]: Dress Lower Body dependent (less than 25% patients effort)   Toileting   Level of [place]: Toilet dependent (less than 25% patients effort)   Instrumental Activities of Daily Living (IADL)   Previous Responsibilities   (enjoys painting as a hobby)   Activities of Daily Living Analysis   Impairments Contributing to Impaired Activities of Daily Living balance impaired;flexibility decreased;pain;ROM decreased;strength decreased   General Therapy Interventions   Planned Therapy Interventions ADL retraining;cognition;ROM;strengthening;home program guidelines;progressive activity/exercise  (monitor cognition)   Clinical Impression   Criteria for Skilled Therapeutic Interventions Met yes, treatment indicated   OT Diagnosis Decline in ADL performance   Influenced by the following impairments pain,weakness, decreased ROM/baseline contractures decreased activity caryl., impaired balance   Assessment of Occupational Performance 3-5 Performance Deficits   Identified Performance Deficits Currently below baseline w/ feeding self, grooming/hyg., rolling/toileting   Clinical Decision Making (Complexity) Low complexity   Therapy Frequency 3x/week   Predicted Duration of Therapy Intervention (days/wks) 3-5 days   Anticipated Equipment Needs at Discharge raised toilet seat;other (see comments)  ([facility])   Anticipated Discharge Disposition Home;Home with Assist   Risks and Benefits of Treatment have been explained. Yes   Patient, Family & other staff in agreement with plan of care Yes   Clinical Impression Comments OT:Will plan to see 3X/week to address grooming/hyg., feeding self, bed mobility for toilelting, UE strength/ROM   [facility] AM-PAC TM \"6 Clicks\"   2016, Trustees of [facility], under license to [employer].  All rights " "reserved.   6 Clicks Short Forms Daily Activity Inpatient Short Form   [facility] AM-PAC  \"6 Clicks\" Daily Activity Inpatient Short Form   1. Putting on and taking off regular lower body clothing? 1 - Total   2. Bathing (including washing, rinsing, drying)? 1 - Total   3. Toileting, which includes using toilet, bedpan or urinal? 1 - Total   4. Putting on and taking off regular upper body clothing? 1 - Total   5. Taking care of personal grooming such as brushing teeth? 2 - A Lot   6. Eating meals? 2 - A Lot   Daily Activity Raw Score (Score out of 24.Lower scores equate to lower levels of function) 8   Total Evaluation Time   Total Evaluation Time (Minutes) 20     "

## 2019-12-22 NOTE — PROGRESS NOTES
Neuro: disoriented to time and situation. Follows commands. Ivorian-speaking with some English.   Pulm: Extubated at 1030am without complications. On RA now. LS diminished.   CV: SR/ST w/ WNL BPs  : Swain patent with adequate UOP  GI: Rectal tube in place with some leakage around insertion. Maroon watery stools. 500ml this shift  Extremities: purposeful movements on BUE with limited ROM. BLE contracted; unable to extend.  Skin: red on rectal and nataly area. Nataly care gently done throughout the shift.   Lines: R internal jugular. PIVx1  Family: Son updated at bedside. Possible transfer out of ICU tomorrow.

## 2019-12-22 NOTE — PROGRESS NOTES
Critical Care Progress Note      12/22/2019    Name: Eliza Thurman MRN#: [id number]   Age: 59 year old YOB: 1960     [facility]                 Problem List:   Active Problems:    Hemorrhagic shock (H)  upper GI bleeding -- resolved  WOLFGANG  Lactic acidosis -- resolved         Summary/Hospital Course:     59F Marshallese speaking with pmh of MS and gastric ulcer disease now presented to OSH with melena and emesis.  She was very unstable prior to transfer and apparently cardiac arrested just prior to transport.  On arrival here she was taken immediately to IR where embolization was performed and hemostasis was apparently achieved.    On arrival back to the ICU she was awake and following commands.  She was sedated and kept intubated for one more day to ensure stability, but she was extubated without difficulty on 12/21.    This morning she is resting comfortably.  Has a lot of chronic pain in his lower extremities which is being controlled with dilaudid iv while we await swallow eval.  No nausea, no vomiting.  No dizzyness, no lightheadedness.      Assessment and plan :     Eliza Thurman IS a 59 year old female admitted on 12/20/2019 for upper GI bleeding.   I have personally reviewed the daily labs, imaging studies, cultures and discussed the case with referring physician and consulting physicians.   My assessment and plan by system for this patient is as follows:    Neurology/Psychiatry:   1. H/o advanced MS  2. Analgesia:  Prn dilaudid  3. Sedation:  Propofol.  Prn midazolam  4. S/p cardiac arrest:  Mental status preserved.  No hypothermia needed.    Cardiovascular:   1  Hemorrhagic shock:  Resolved.  Now off pressors.    Pulmonary/Ventilator Management:   1. Doing well on room air.    GI and Nutrition:   1. NPO.  Formal swallow eval today  2. Upper GI bleeding:  Appreciate GI and IR efforts.  Embolization by IR believed successful.  Continue protonix drip x72 hours, transition to bid  dosing Monday 12/23.  Gen surg following peripherally.     Renal/Fluids/Electrolytes:   1. Ronald:  Creat downtrended to 2.17 then bumped in setting of IV contrast, up to 2.95 today.  Acceptable UOP, though.  Continue to trend.  2.  Hypernatremia:  Stable at 151.  Increased d5 from 50 to 100/hr.    Infectious Disease:   1. No acute issues    Endocrine:   1. Hyperglycemia:  Likely reactive.  Continue insulin drip.    Hematology/Oncology:   1. Wbc 23, stably elevated; suspect reactive, no signs/sxs of infection  2.  hgb 10.6 --  Has been very stable.    3.  pltlts 119 --  Acceptable.      IV/Access:   1. Venous access - central line    ICU Prophylaxis:   1. DVT: mech  2. Feeding - npo pending swallow eval  3.  Disposition - may leave icu today           Interim History:     No events.  This morning she is resting comfortably.  Has a lot of chronic pain in his lower extremities which is being controlled with dilaudid iv while we await swallow eval.  No nausea, no vomiting.  No dizzyness, no lightheadedness.         Key Medications:         IV fluid REPLACEMENT ONLY       D5W 50 mL/hr at 12/21/19 2345     insulin (regular) Stopped (12/20/19 1400)     pantoprazole (PROTONIX) infusion ADULT/PEDS GREATER than or EQUAL to 45 kg 8 mg/hr (12/21/19 2349)               Physical Examination:   Temp:  [96.4  F (35.8  C)-98.4  F (36.9  C)] 96.6  F (35.9  C)  Pulse:  [] 92  Heart Rate:  [] 91  Resp:  [8-20] 17  BP: (100-132)/(56-91) 124/69  FiO2 (%):  [30 %] 30 %  SpO2:  [94 %-100 %] 97 %          Intake/Output Summary (Last 24 hours) at 12/22/2019 0807  Last data filed at 12/22/2019 0600  Gross per 24 hour   Intake 1270 ml   Output 2045 ml   Net -775 ml         Wt Readings from Last 4 Encounters:   12/22/19 47.3 kg (104 lb 4.4 oz)   12/20/19 41.5 kg (91 lb 7.9 oz)     BP - Mean:  [] 99  Ventilation Mode: CPAP/PS  (Continuous positive airway pressure with Pressure Support)  FiO2 (%): 30 %  Rate Set (breaths/minute):  16 breaths/min  Tidal Volume Set (mL): 450 mL  PEEP (cm H2O): 5 cmH2O  Pressure Support (cm H2O): 5 cmH2O  Oxygen Concentration (%): 30 %  Resp: 17    Recent Labs   Lab 12/20/19  1225 12/20/19  0340   PH  --  7.21*   PCO2  --  24*   PO2  --  227*   HCO3  --  10*   O2PER 40% FI02 VENT Ventilator       GEN: no acute distress, pleasant  HEENT: head ncat, sclera anicteric, OP patent, trachea midline   NECK: supple  PULM: unlabored, clear anteriorly    CV/COR: RRR S1S2 no gallop,  No rub, no murmur  ABD: soft nontender, hypoactive bowel sounds, no mass  EXT:  No Edema,   Warm x4  NEURO: awake, alert, oriented.  Apparently averbal at baseline but comminucates through writing.  Limited motor strength at baseline due to underling MS -- present condition seems consistent with baseline.  SKIN: no obvious rash  LINES: clean, dry intact         Data:   All data and imaging reviewed     ROUTINE ICU LABS (Last four results)  CMP  Recent Labs   Lab 12/22/19  0405 12/21/19  0400 12/20/19  0935 12/20/19  0555 12/20/19  0340  12/19/19  1909   * 151* 148* 149* 146*  --  142   POTASSIUM 4.7 4.7 4.3 4.9 5.2   < > 5.3   CHLORIDE 125* 123* 121* 123* 121*  --  116*   CO2 17* 18* 16* 14* 11*  --  13*   ANIONGAP 9 10 11 12 14  --  13   * 99 172* 253* 216*  --  105*   * 111* 98* 104* 130*  --  147*   CR 2.95* 2.60* 2.17* 2.26* 2.49*  --  3.27*   GFRESTIMATED 17* 19* 24* 23* 20*  --  15*   GFRESTBLACK 19* 22* 28* 27* 24*  --  17*   MORGAN 7.2* 7.5* 8.6 8.1* 6.5*  --  7.3*   MAG 1.9 2.0 1.4*  --   --   --   --    PHOS 5.8* 5.8* 7.8*  --   --   --   --    PROTTOTAL  --   --  4.5* 3.9* 4.3*  --  5.2*   ALBUMIN  --   --  2.3* 1.8* 1.8*  --  2.0*   BILITOTAL  --   --  0.8 0.5 0.4  --  0.2   ALKPHOS  --   --  45 42 43  --  81   AST  --   --  39 28 22  --  33   ALT  --   --  23 22 17  --  24    < > = values in this interval not displayed.     CBC  Recent Labs   Lab 12/22/19  0405 12/21/19  1840 12/21/19  0400 12/21/19  0030   WBC  23.3* 23.7* 21.3* 20.0*   RBC 3.58* 3.48* 3.56* 3.46*   HGB 10.6* 10.3* 10.6* 10.4*   HCT 31.5* 30.2* 30.7* 29.8*   MCV 88 87 86 86   MCH 29.6 29.6 29.8 30.1   MCHC 33.7 34.1 34.5 34.9   RDW 16.1* 15.8* 15.0 14.9   * 120* 119* 118*     INR  Recent Labs   Lab 12/21/19  0400 12/21/19  0030 12/20/19  1814 12/20/19  1225   INR 1.34* 1.31* 1.34* 1.30*     Arterial Blood Gas  Recent Labs   Lab 12/20/19  1225 12/20/19  0340   PH  --  7.21*   PCO2  --  24*   PO2  --  227*   HCO3  --  10*   O2PER 40% FI02 VENT Ventilator       All cultures:  Recent Labs   Lab 12/20/19  0100 12/19/19  1908   CULT No growth after 2 days No growth after 3 days     No results found for this or any previous visit (from the past 24 hour(s)).    Labs personally reviewed/interpreted:  See a/p    D/w on-call hospitalist

## 2019-12-22 NOTE — PLAN OF CARE
"Discharge Planner PT   Patient plan for discharge: Home.  Current status: Evaluation completed, single treatment provided. 58 yo female with MS and gastric ulcer disease was tx from an OSH with melena and emesis. POD #2 emergent IR procedure for embolization of a duodenal ulcer.     Resides in a house with her Son and Daughter-in-law. Reports she is usually in bed or in her power chair at baseline. She states she paints, writes and feeds her self, otherwise all cares and transfers are provided by her Son and Daughter in-law.    Pt presents alert to self and situation. B knee and elbow flexion contractures. Repeatedly asks therapist for a massage, declines any out of bed mobility-unless her family can do the transfers. Education provided on importance of mobility and position changes, pt continues to decline. Did agree and participate in a few LE and core movements. Pt states \"Stop,\" with any attempt to assist with range and increased motion, just requests therapist to \"rub\" the legs.     Education provided on PT services at length. Pt reports she will recover at home, her Son and Daughter in-law will care for her. No further skilled PT services indicated at this time, OT will continue to work with pt on UE ROM/strength as pt will allow. Recommend nursing continue to encourage OOB with use of lift equipment.     Barriers to return to prior living situation: None if family continues to provide assist for all transfers, ADLs and IADls.     Recommendations for discharge: Home with continued level of assist as prior to admit. Discharge from PT.    Rationale for recommendations: Pt is not interested in transfer training as she reports her son \"scoops me up and moves me.\" She has very limited tolerance for LE movement/AAROM and only requests massage. PT order completed at this time.          Entered by: Kathryn Araujo 12/22/2019 3:49 PM       "

## 2019-12-22 NOTE — PLAN OF CARE
Discharge Planner OT   Patient plan for discharge: Not stated  Current status: OT evalaution completed, unable to initiate treatment, due to pt.'s refusal/pain. Ed. in role of OT/POC; Pt. resides w/ son and dtr.-in-law, dependent for transfers--son  lifts into electric WC when he is home,otherwise pt.stays in bed. Dtr. In-law completes most ADL's, including hair care, bathing(son lifts onto a chair in bathtub), dressing, toileitng(uses bed pan);pt. related she can feed herself, assist w/ some oral/facial cares, paints for a hobby. Pt. has baseline contractures, L hand and BLE's contracted, painful w/ any attempts at ROM/PROM;Pt. refused to roll, participate in activity  this date, therefore, evaluation only completed,  present, no family.    Pt. needed assist to bring cup to mouth, c/o pain w/ UE movement, attempt to elevate HOB for safe swallow, unable to tolerate HOB elevated;pt. wanting assist/nursing to feed her at end of visit.                Barriers to return to prior living situation: weakness;level of assist/below baseline  Recommendations for discharge: Home w/ continued 24/7 assist  Rationale for recommendations: Anticipate pt. will progress to baseline status, w/continued family assist w/all mobility/transfers/I/ADL's.       Entered by: Ban Sawyer 12/22/2019 1:32 PM

## 2019-12-22 NOTE — PLAN OF CARE
Discharge Planner SLP   Patient plan for discharge: did not discuss  Current status: Clinical swallow evaluation completed with in person  jeanie. Pt with hx of advanced MS but denies any difficulty swallowing or modifications to diet - no hx per chart review. She was intubated 12/20/19 to 10/21/19 (2 days total) and currently on RA. Her voice is mildly hoarse and mildly hypophonic and she reports a mild sore throat. Assessment completed with thin liquids, nectar thick liquids, puree solids, general solids. She currently presents with mild oropharyngeal dysphagia 2/2 weakness, recent intubation and positioning (refusing to sit up further than 45 degrees). Mastication with very small bites functional but requires liquid wash to achieve oral clearance. Hyolaryngeal ROM reduced but consistently achieved to palpation. No overt signs/sx aspiration across all, stable SP02 and RR across trials.     Recommendations: initiate diet of dysphagia diet level 2, thin liquids (straws OK) with 1:1 assist with feeding. Slow pacing, small bites, monitor for oral clearance, encourage pt to sit as upright as possible.     Barriers to return to prior living situation: weakness  Recommendations for discharge: TCU   Rationale for recommendations: Ongoing SLP for dysphagia management as pt below baseline. Given weakness currently recommend TCU however pt may progress and meet goals during IP stay and be appropriate for home if family vs caregiver support present.        Entered by: Morelia English 12/22/2019 9:44 AM

## 2019-12-22 NOTE — PROGRESS NOTES
"   12/22/19 0800   Quick Adds   Type of Visit Initial PT Evaluation       Present yes   Language Czech   Living Environment   Lives With child(zhang), adult   Living Arrangements house   Home Accessibility no concerns   Transportation Anticipated family or friend will provide   Living Environment Comment Reports use of a power wc for mobility   Self-Care   Usual Activity Tolerance fair   Current Activity Tolerance poor   Regular Exercise No   Activity/Exercise/Self-Care Comment power chair, reports she also has a chair in the shower   Functional Level Prior   Ambulation 4-->completely dependent   Transferring 4-->completely dependent   Toileting 3-->assistive equipment and person   Bathing 4-->completely dependent   Fall history within last six months no   Which of the above functional risks had a recent onset or change?   (functional activity tolerance)   General Information   Onset of Illness/Injury or Date of Surgery - Date 12/20/19   Referring Physician Darinel Vasquez MD   Patient/Family Goals Statement Pt goal is to return home.   Pertinent History of Current Problem (include personal factors and/or comorbidities that impact the POC) 58 yo female with MS and gastric ulcer disease was tx from an OSH with melena and emesis. POD #2 emergent IR procedure for embolization of a duodenal ulcer.    Precautions/Limitations fall precautions   General Observations thin and frail, multiple bruises, mepilex bandages between the knees -declines offer for pillow placement. Thin and frail in appearance. Feet wtih edema, states she doesn't like wraps \"they are uncomfortable.\"  Note a nodule behind the L calf.  Has hemosiderin stains over the shins.   Cognitive Status Examination   Orientation person;place   Level of Consciousness alert   Follows Commands and Answers Questions 75% of the time;able to follow single-step instructions   Personal Safety and Judgment impaired   Cognitive Comment With " "each piece of education provided, pt has a reason to not participate .  She is particular that her family is who helps her out.   Pain Assessment   Patient Currently in Pain Yes, see Vital Sign flowsheet  (Not rated, but painful with movement. \"all over.\")   Integumentary/Edema   Integumentary/Edema Comments see comments above   Posture    Posture Forward head position;Protracted shoulders   Posture Comments Contracted knees, elbows, shoulders, fingers, ankles.   Range of Motion (ROM)   ROM Comment Impaired AROM minimal range at UES fingers wrists and elbows B. LEs with ability to mve ~ 10 degrees of flex/ext at contracted knees, wiggles toes and bends hips -hips are also contracted. Able to partially bridge when asked.   Strength   Strength Comments Muscle wasting likly disease, disuse related.   Bed Mobility   Bed Mobility Comments rolling L and R Mod A   Transfer Skills   Transfer Comments Declines OOB   Gait   Gait Comments Non ambulatory   Balance   Balance Comments Declines any OOB.   General Therapy Interventions   Planned Therapy Interventions strengthening;stretching;ROM;bed mobility training;progressive activity/exercise;home program guidelines;risk factor education;transfer training   Clinical Impression   Criteria for Skilled Therapeutic Intervention yes, treatment indicated   PT Diagnosis Impaired functional activity tolerance   Influenced by the following impairments Post op weakness, fatigue, suspect ROM and strength further limited post op.   Functional limitations due to impairments Decreased funtional independence fr4om baseline   Clinical Presentation Stable/Uncomplicated   Clinical Presentation Rationale see MR   Clinical Decision Making (Complexity) Low complexity   Predicted Duration of Therapy Intervention (days/wks) Eval and single treat only   Anticipated Discharge Disposition Home with Assist   Risk & Benefits of therapy have been explained Yes   Patient, Family & other staff in agreement " "with plan of care Yes   [facility] AM-PAC  \"6 Clicks\" V.2 Basic Mobility Inpatient Short Form   1. Turning from your back to your side while in a flat bed without using bedrails? 2 - A Lot   2. Moving from lying on your back to sitting on the side of a flat bed without using bedrails? 1 - Total   3. Moving to and from a bed to a chair (including a wheelchair)? 1 - Total   4. Standing up from a chair using your arms (e.g., wheelchair, or bedside chair)? 1 - Total   5. To walk in hospital room? 1 - Total   6. Climbing 3-5 steps with a railing? 1 - Total   Basic Mobility Raw Score (Score out of 24.Lower scores equate to lower levels of function) 7   Total Evaluation Time   Total Evaluation Time (Minutes) 10     "

## 2019-12-22 NOTE — PROGRESS NOTES
VSS. All pt belongings transferred with the patient.  was present and assisted with communication to pt about transferring. Pt son was at bedside at time of transfer and went with pt to new room [facility]. Pt was transferred with flying squad and CNA.

## 2019-12-23 NOTE — PLAN OF CARE
Discharge Planner SLP   Patient plan for discharge: not discussed  Current status: Patient seen for dysphagia treatment during breakfast meal.  present. RN assisted with positioning as upright as possible in bed given pain due to arthritis. Patient able to feed self given set up assistance with tray. Patient consumed 4-5 oz macaroni and cheese, diced canned peaches and cheese omelette cut into bite size pieces and drank 4 oz thin water by straw. No overt aspiration signs occurred across consistencies. Note slow but functional mastication of semi-solid texture and no oral residue remained.     Recommend continue current dysphagia diet level 2 with thin liquids given set up assist and supervision and swallow strategies (fully upright position, small single sips/bites, slow rate).    Barriers to return to prior living situation: weakness  Recommendations for discharge: TCU   Rationale for recommendations: Ongoing SLP for dysphagia management as pt below baseline. Given weakness currently recommend TCU however pt may progress and meet goals during IP stay and be appropriate for home if family vs caregiver support present.        Entered by: Mary Lindsay 12/23/2019 9:27 AM

## 2019-12-23 NOTE — PROGRESS NOTES
SW:  D:Met with daughter in law and an  this morning.     Daughter in law is requesting placement for patient as she doesn't feel it is safe to leave her mother in law along anymore as has done in the past.   Patient and her family have been in the [place] for about 3/12 years.  They moved to [place] about one year ago.  Her son's job takes him away from their home for several days at a time.  The daughter in law is the primary caregiver and does leave the home with her children and can be gone for several hours at a time.   She reports the patient has been declining over the past months and is to the point where she cannot help herself at all.  For the reason the daughter in law isn't comfortable with patient returning home for the time being. She is requesting a temporary stay in a care center and over time she and her , the son of patient, will need to decide about a long term plan.  Patient does have a MA plan.  She has not wanted to participate in therapy so she may only qualify for a [facility] placement and not a TCU placement.  We need to determine if her MA plan has nursing home benefits.   For future contacts with daughter in law speaks limited English and requires an  to be present.  PLAN:  Daughter in law lives in [place].  SW will begin making referrals and check insurance.

## 2019-12-23 NOTE — PROGRESS NOTES
GI FOLLOW UP    S:  has left for the day. No pain/n/v. Old dark blood is still seen in rectal tube.    O;/60 (BP Location: Left arm)   Pulse 92   Temp 97.3  F (36.3  C) (Oral)   Resp 20   Wt 51.2 kg (112 lb 14 oz)   SpO2 95%   GEN: NAD  HEENT: no scleral icterus  CV: RRR  PULM:CTA  ABD:soft, NTND    Labs reviewed. Hgb stable at 10.6-->10.4    A/P: 58 yo F with hx of arthritis, prior NSAID use (diclofenac) presented with melena, hypotension with evidence of hemorrhagic shock requiring massive transfusion protocol who ultimately underwent IR embolization of the GDA.     #GIB - passing old blood but clinically stable - will monitor closely for now   -complete 72 hours PPI gtt (one more day) and transition to BID PPI  -outpatient EGD to check for healing and rule out malignancy it is suspected her bleeding was due to an ulcer from NSAID use.  If clinically stable, this can be done in 1-2 months.   -No NSAIDs moving forward  -H pylori stool antigen pending  Recommend treating if positive.    GI service will continue to follow.    Ana M Lamb CNP   [facility]  Cell: [phone]

## 2019-12-23 NOTE — PROGRESS NOTES
Interventional Radiology Progress Note:  Date: 2019   Patient name: Eliza Thurman  MRN:[id number]  :  1960      History: Patient seen bedside with , denies any abdominal pain, is passing flatus/stool via rectal tube.   No hemoptysis or vomiting.      Physical Exam: Left femoral access site c/d/i  Abd- ndnt, no nataly-umbilical tenderness  Extremities- 2+ edema in the lower extremities, palpable DP through edema    Labs:  /60 (BP Location: Left arm)   Pulse 92   Temp 97.3  F (36.3  C) (Oral)   Resp 20   Wt 51.2 kg (112 lb 14 oz)   SpO2 95%      Output:  Output by Drain (mL) 19 0700 - 19 1459 19 1500 - 19 2259 19 2300 - 19 0659 19 0700 - 19 1459 19 1500 - 19 2259 19 2300 - 19 0659 19 0700 - 19 1126   Requested LDAs do not have output data documented.        Assessment: s/p coil embolization of GDA and pancreaticoduodenal arteries for large UGI bleeding     Plan: No sign of re-bleeding with advances in diet.  No abdominal pain and no pain or sign of hematoma at femoral access site. Explained angio-seal device to patient with .     Khanh Arambula PA-C  Interventional Radiology

## 2019-12-23 NOTE — PLAN OF CARE
Pt AOx4 (Slovenian speaking), VSS on RA, Ax2 w/ lift. Pain controlled w/ PRN tylenol. R jugular and PIV infusing. Swain in place; patent. Rectal tube intact; patent. Pt tolerating DD2, thin liquid diet; little appetite. Tele: SR. Q2 repo mobility limited d/t MS and RA; pt will occasionally refuse (pt education provided). Plan pending pt progress.

## 2019-12-23 NOTE — PROGRESS NOTES
[facility]    Medicine Progress Note - Hospitalist Service       Date of Admission:  12/20/2019  Assessment & Plan    Eliza Thurman is a 59 year old German speaking female who initially presented to [facility] with a GI bleed. She was hemodynamically unstable and had a cardiac arrest prior to transport. She was transferred to [facility] and immediately taken to IR for successful coil embolization of the GDA with resolution of the GI bleeding. She was subsequently admitted to the ICU for further evaluation and management.    Hemorrhagic shock  GI bleed from the GDA  Acute blood loss anemia  Lactic acidosis  Cardiac arrest  - Initially presented to [facility], admitted to their ICU.  - Hemodynamically unstable and had a cardiac arrest at [facility].  - Subsequently transferred to [facility] and had IR emoblization of GDA Gi bleed.  - Initially, massive transfusion protocol was initiated. Hemoglobin initially 5.0.  - Hemoglobin stable now at 10.4 this morning.  - Continue PPI drip for now, transition to BID PPI tomorrow.  - If rebleeds, needs general surgery consult as the GDA has already been embolized by IR.  - GI following, planning EGD at some point.     Acute kidney injury  - Likely pre-renal related shock and contrast.  - Creatinine appears to have peaked and is trending down slightly this morning.  - Recheck in AM.  - Monitor intake & output.  - Avoid nephrotoxins.  - Potassium within normal limits. Bicarb slightly low, stable compared to yesterday.  - Continue IV fluids.    Advanced multiple sclerosis  - Functional status had been declining prior to this admission.  - PT/OT consulted.  - May need TCU, discussed with social work.    Hypernatremia  - Resolved with D5W infusion.  - Adjust IV fluids as noted above.  - Recheck in AM.    Hyperglycemia  - Resolved.    Leukocytosis  - Likely reactive. No obvious infection.  - Improving.  - Recheck in AM.        Diet:  Combination Diet Vegetarian Diet; Dysphagia Diet Level 2: Mechan Altered; Thin Liquids (water, ice chips, juice, milk gelatin, ice cream, etc)    DVT Prophylaxis: Pneumatic Compression Devices  Swain Catheter: in place, indication: Strict 1-2 Hour I&O  Code Status: Full Code      Disposition Plan   Expected discharge: 2 - 3 days, recommended to transitional care unit once hemoglobin stable and renal function improved.  Entered: Darinel Vasquez MD 12/23/2019, 10:37 AM       The patient's care was discussed with the Bedside Nurse, Care Coordinator/, Patient and Patient's Family.    Darinel Vasquez MD  Hospitalist Service  [facility]    ______________________________________________________________________    Interval History   Eliza Thurman was seen this morning. Scottish  was present during my visit. Patient's daughter-in-law was in the room. Discussed plan of care. Questions invited and answered. Patient feels about the same as yesterday. No new complaints/concerns. Denies fevers, chest pain, shortness of breath, nausea, abdominal pain. Has chronic pain from advanced multiple sclerosis.    Data reviewed today: I reviewed all medications, new labs and imaging results over the last 24 hours. I personally reviewed no images or EKG's today.    Physical Exam   Vital Signs: Temp: 97.3  F (36.3  C) Temp src: Oral BP: 112/60 Pulse: 92 Heart Rate: 82 Resp: 20 SpO2: 95 % O2 Device: None (Room air)    Weight: 112 lbs 14.01 oz  Constitutional: awake, alert, cooperative, no apparent distress, and appears stated age  Respiratory: No increased work of breathing, good air exchange, clear to auscultation bilaterally, no crackles or wheezing  Cardiovascular: Normal apical impulse, regular rate and rhythm, normal S1 and S2, no S3 or S4, and no murmur noted  GI: No scars, normal bowel sounds, soft, non-distended, non-tender, no masses palpated, no hepatosplenomegally  Skin: warm,  dry  Neurologic: Awake, alert, oriented to name, place and time.  Cranial nerves II-XII are grossly intact.  Motor is 5 out of 5 bilaterally.  Cerebellar finger to nose, heel to shin intact.  Sensory is intact.  Babinski down going, Romberg negative, and gait is normal.    Data   Recent Labs   Lab 12/23/19  0610 12/22/19  0405 12/21/19  1840 12/21/19  0400 12/21/19  0030 12/20/19  1814  12/20/19  0935  12/20/19  0555  12/19/19  1909   WBC 17.6* 23.3* 23.7* 21.3* 20.0* 20.0*   < >  --    < >  --    < > 9.0   HGB 10.4* 10.6* 10.3* 10.6* 10.4* 10.5*   < >  --    < >  --    < > 5.0*   MCV 88 88 87 86 86 86   < >  --    < >  --    < > 66*   * 119* 120* 119* 118* 119*   < >  --    < >  --    < > 283   INR  --   --   --  1.34* 1.31* 1.34*   < > 1.26*  --  1.67*   < > 1.34*    151*  --  151*  --   --   --  148*  --  149*   < > 142   POTASSIUM 4.5 4.7  --  4.7  --   --   --  4.3  --  4.9   < > 5.3   CHLORIDE 116* 125*  --  123*  --   --   --  121*  --  123*   < > 116*   CO2 17* 17*  --  18*  --   --   --  16*  --  14*   < > 13*   * 117*  --  111*  --   --   --  98*  --  104*   < > 147*   CR 2.77* 2.95*  --  2.60*  --   --   --  2.17*  --  2.26*   < > 3.27*   ANIONGAP 9 9  --  10  --   --   --  11  --  12   < > 13   MORGAN 6.9* 7.2*  --  7.5*  --   --   --  8.6  --  8.1*   < > 7.3*   GLC 91 108*  --  99  --   --   --  172*  --  253*   < > 105*   ALBUMIN  --   --   --   --   --   --   --  2.3*  --  1.8*   < > 2.0*   PROTTOTAL  --   --   --   --   --   --   --  4.5*  --  3.9*   < > 5.2*   BILITOTAL  --   --   --   --   --   --   --  0.8  --  0.5   < > 0.2   ALKPHOS  --   --   --   --   --   --   --  45  --  42   < > 81   ALT  --   --   --   --   --   --   --  23  --  22   < > 24   AST  --   --   --   --   --   --   --  39  --  28   < > 33   LIPASE  --   --   --   --   --   --   --   --   --   --   --  209   TROPI  --   --   --   --   --   --   --   --   --   --   --  0.043    < > = values in this interval not  displayed.     Medications     D5W 100 mL/hr at 12/23/19 1224     pantoprazole (PROTONIX) infusion ADULT/PEDS GREATER than or EQUAL to 45 kg 8 mg/hr (12/23/19 1115)       heparin lock flush  5-10 mL Intracatheter Q24H     insulin aspart  1-7 Units Subcutaneous TID AC     insulin aspart  1-5 Units Subcutaneous At Bedtime

## 2019-12-23 NOTE — PLAN OF CARE
DATE & TIME: 12/22/19 1900-0730      Cognitive Concerns/ Orientation : AOx4, Estonian speaking   BEHAVIOR & AGGRESSION TOOL COLOR: green  CIWA SCORE:   ABNL VS/O2: VSS  MOBILITY: Hx of MS, total care, lift, LEs contracted, low activity tolerance   PAIN MANAGMENT: denies, Hx RA,   DIET: DD2/thin, vegetarian, feeder. No appetite and not eating   BOWEL/BLADDER: hagan and rectal tube (loose bloody BM)  ABNL LAB/BG: Hgb stable  DRAIN/DEVICES: hagan, rectal tube, R internal jugular, PIV  TELEMETRY RHYTHM: SR  SKIN: rectum redness, barrier applied with improvement  TESTS/PROCEDURES: GI following  D/C DAY/GOALS/PLACE: Pending progress.   OTHER IMPORTANT INFO:

## 2019-12-24 NOTE — PROCEDURES
PREPROCEDURE DIAGNOSIS: Hypoxemic respiratory failure and hypotension  POSTPROCEDURE DIAGNOSIS: same   PROCEDURE: Intubation   SURGEON: Evin  ANESTHESIA: Etomidate/rocuronium  EBL: 0cc   FINDINGS: Visualization of the ETT through the vocal cords with appropriate color change on EBV capnography  COMPLICATIONS: None apparent   SPECIMEN: none   OPERATIVE INDICATIONS: Eliza Thurman is a 59 year old female with an acute GI bleed.  Her volume status has become more tenuous throughout the day.  She is set to undergo an emergent endoscopy with GI.  Given her hypotension and concern for a tenuous airway with borderline hypoxemia, she was intubated.  OPERATIVE PROCEDURE:   Informed consent was obtained prior to our procedure from the patient verbally.  A rapid sequence induction was performed with etomidate and rocuronium.  Appropriate analgesia/sedation achieved.  Cricocoid pressure held.  #3 MAC blade was used for fiberoptic guidance of intubation.  Appropriate grade 2A view visualized.  ETT placed in the trachea without difficulty.  Appropriate color change on CO2 capnography.  ETT secured at 24cm at the teeth.  Dr. Perales was present for all parts of this case.   MAJ Nitin Cleary MD, FS  F1, PGY9  [facility] SICU Fellow    S/d/w Dr. Perales who agrees with the plan of care as outlined above

## 2019-12-24 NOTE — PLAN OF CARE
Pt A/Ox4, able to understand some English if you speak slowly.  Vitally stable.  Rectal tube in place, minimal output.  Swain in place.   Declined to eat dinner tonight.  Continue to monitor.

## 2019-12-24 NOTE — PROGRESS NOTES
GI NOTE  ( see procedure report)    EGD   Small amount of blood in stomach  Large clot in duodenal bulb with bleeding around the clot  Ulceration in the second prtion of the duodenum without bleeding    Unable to remove clot because most likely in area of gastroduodenal artery. The artery coiled earlier this week by IR.  No additional sites of bleeding  IR already attempted coil earlier this week.    If rebleeds would need surgical intervention    Odilia Chisholm MD  [facility]  Office

## 2019-12-24 NOTE — PLAN OF CARE
DATE & TIME: 12/23/19 7-11p  Cognitive Concerns/ Orientation : AOx4, Niuean speaking, some basic English  BEHAVIOR & AGGRESSION TOOL COLOR: green, calm/coop  ABNL VS/O2: VSS on RA  MOBILITY: total care, lift, turned/repo q2h, LEs contracted,  PAIN MANAGMENT: denies, Hx RA,   DIET: DD2/thin, poor po  BOWEL/BLADDER: Swain and Rectal tube- continues with loose bloody stools  ABNL LAB/BG: Hgb 10.4/stable  DRAIN/DEVICES: Swain/rectal tube, R internal jugular, PIV  TELEMETRY RHYTHM:NSR  SKIN: rectum redness, barrier applied   TESTS/PROCEDURES: GI following  D/C DAY/GOALS/PLACE: Pending progress.   OTHER IMPORTANT INFO: Pt sleepy, refuses assistance with turning, likes to do it herself very slowly, denies pain, OT./Speech/SW following.           None Remove Edit

## 2019-12-24 NOTE — PROGRESS NOTES
[facility]    Medicine Progress Note - Hospitalist Service       Date of Admission:  12/20/2019  Assessment & Plan   Eliza Thurman is a 59 year old Thai speaking female who initially presented to [facility] with a GI bleed. She was hemodynamically unstable and had a cardiac arrest prior to transport. She was transferred to [facility] and immediately taken to IR for successful coil embolization of the GDA with resolution of the GI bleeding. She was subsequently admitted to the ICU for further evaluation and management.    Hemorrhagic shock  GI bleed from the GDA  Acute blood loss anemia  Lactic acidosis  Cardiac arrest  - Initially presented to [facility], admitted to their ICU.  - Hemodynamically unstable and had a cardiac arrest at [facility].  - Subsequently transferred to [facility] and had IR emoblization of GDA Gi bleed.  - Initially, massive transfusion protocol was initiated. Hemoglobin initially 5.0.  - Hemoglobin stabilized around 10.5, but now down to 9.2 this morning.  - Stop PPI drip, start BID IV PPI.  - If rebleeds, needs general surgery consult as the GDA has already been embolized by IR.  - GI following, planning EGD as an outpatient unless she develops recurrent bleeding.  - Hemoglobin trending down this morning and she is now complaining of abdominal pain/right upper chest/shoulder pain. Continues to have maroon output from rectal tube.   - Will recheck hemoglobin now and get abdominal and chest x-rays. Further management pending results.  Addendum:  - Discussed with GI and general surgery. Hemoglobin down to 8.8. Receiving IV fluid bolus. Still hypotensive. Concern for recurrent GI bleed. Appreciate GI and general surgery assistance with plan of care. Will transfer to ICU.     Acute kidney injury  - Likely pre-renal related shock and contrast.  - Creatinine appears to have peaked and is trending down slowly.  - Monitor intake & output.  - Avoid  nephrotoxins.  - Potassium within normal limits. Bicarb slightly trending down.  - Will change fluids to D5 with sodium bicarb.  - Recheck labs this afternoon.    Advanced multiple sclerosis  - Functional status had been declining prior to this admission.  - PT/OT consulted.  - May need TCU, discussed with social work.    Hypernatremia  - Resolved with D5W infusion.  - Adjust IV fluids as noted above.  - Recheck in AM.    Hyperglycemia  - Resolved.    Leukocytosis  - Likely reactive. No obvious infection.  - Improving without intervention.  - Recheck in AM.        Diet: Combination Diet Vegetarian Diet; Dysphagia Diet Level 2: Mechan Altered; Thin Liquids (water, ice chips, juice, milk gelatin, ice cream, etc)    DVT Prophylaxis: Pneumatic Compression Devices  Swain Catheter: in place, indication: Strict 1-2 Hour I&O  Code Status: Full Code      Disposition Plan   Expected discharge: 2 - 3 days, recommended to transitional care unit once hemoglobin stable, renal function improved and safe disposition plan/ TCU bed available.  Entered: Darinel Vasquez MD 12/24/2019, 10:59 AM       The patient's care was discussed with the Bedside Nurse, Patient and Patient's Family.    Darinel Vasquez MD  Hospitalist Service  [facility]    ______________________________________________________________________    Interval History   Eliza Thurman complains of right upper chest/right shoulder pain today, worse when she coughs. Also complains of abdominal pain and intermittent nausea. Denies fevers, shortness of breath. Still has rectal tube in place, maroon stool in the bag.  no longer here this morning. Patient's daughter assisted with translation.    Data reviewed today: I reviewed all medications, new labs and imaging results over the last 24 hours. I personally reviewed no images or EKG's today.    Physical Exam   Vital Signs: Temp: 97.6  F (36.4  C) Temp src: Oral BP: (!) 88/51 Pulse: 83 Heart  Rate: 91 Resp: 18 SpO2: 95 % O2 Device: None (Room air)    Weight: 112 lbs 14.01 oz  Constitutional: awake, alert, cooperative, no apparent distress  Respiratory: clear to auscultation bilaterally, no crackles or wheezing  Cardiovascular: regular rate and rhythm, normal S1 and S2, no murmur noted  GI: normal bowel sounds, soft, non-distended, generalized tenderness, no rebound or guarding  Skin: warm, dry  Neurologic: awake, alert, oriented to name, place and time    Data   Recent Labs   Lab 12/24/19  0615 12/23/19  0610 12/22/19  0405  12/21/19  0400 12/21/19  0030 12/20/19  1814  12/20/19  0935  12/20/19  0555  12/19/19  1909   WBC 12.2* 17.6* 23.3*   < > 21.3* 20.0* 20.0*   < >  --    < >  --    < > 9.0   HGB 9.2* 10.4* 10.6*   < > 10.6* 10.4* 10.5*   < >  --    < >  --    < > 5.0*   MCV 88 88 88   < > 86 86 86   < >  --    < >  --    < > 66*   * 123* 119*   < > 119* 118* 119*   < >  --    < >  --    < > 283   INR  --   --   --   --  1.34* 1.31* 1.34*   < > 1.26*  --  1.67*   < > 1.34*    142 151*  --  151*  --   --   --  148*  --  149*   < > 142   POTASSIUM 4.4 4.5 4.7  --  4.7  --   --   --  4.3  --  4.9   < > 5.3   CHLORIDE 112* 116* 125*  --  123*  --   --   --  121*  --  123*   < > 116*   CO2 15* 17* 17*  --  18*  --   --   --  16*  --  14*   < > 13*   BUN 95* 105* 117*  --  111*  --   --   --  98*  --  104*   < > 147*   CR 2.63* 2.77* 2.95*  --  2.60*  --   --   --  2.17*  --  2.26*   < > 3.27*   ANIONGAP 9 9 9  --  10  --   --   --  11  --  12   < > 13   MORGAN 6.5* 6.9* 7.2*  --  7.5*  --   --   --  8.6  --  8.1*   < > 7.3*   * 91 108*  --  99  --   --   --  172*  --  253*   < > 105*   ALBUMIN  --   --   --   --   --   --   --   --  2.3*  --  1.8*   < > 2.0*   PROTTOTAL  --   --   --   --   --   --   --   --  4.5*  --  3.9*   < > 5.2*   BILITOTAL  --   --   --   --   --   --   --   --  0.8  --  0.5   < > 0.2   ALKPHOS  --   --   --   --   --   --   --   --  45  --  42   < > 81   ALT  --    --   --   --   --   --   --   --  23  --  22   < > 24   AST  --   --   --   --   --   --   --   --  39  --  28   < > 33   LIPASE  --   --   --   --   --   --   --   --   --   --   --   --  209   TROPI  --   --   --   --   --   --   --   --   --   --   --   --  0.043    < > = values in this interval not displayed.     Medications     IV infusion builder WITH additives 100 mL/hr at 12/24/19 1000     pantoprazole (PROTONIX) infusion ADULT/PEDS GREATER than or EQUAL to 45 kg 8 mg/hr (12/24/19 1001)       sodium chloride 0.9%  500 mL Intravenous Once     heparin lock flush  5-10 mL Intracatheter Q24H     insulin aspart  1-7 Units Subcutaneous TID AC     insulin aspart  1-5 Units Subcutaneous At Bedtime

## 2019-12-24 NOTE — PLAN OF CARE
Discharge Planner SLP   Patient plan for discharge: Not addressed.  Current status: Patient seen for swallow treatment with interperter present. She was able to feed herself with assistance. She tolerated trials of pudding and tea with premature entry suspected, but no Sx of aspiration however has a weak cough for airway protection. No solids were on her breakfast tray to trial. Did well yesterday with semi-soft textures per Epic notes.     Recommend continue current dysphagia diet level 2 with thin liquids given set up assist and supervision and swallow strategies (fully upright position, small single sips/bites, slow rate).     Barriers to return to prior living situation: weakness  Barriers to return to prior living situation: Increased level of care.  Recommendations for discharge: TCU  Rationale for recommendations: Continue ST needs for dysphagia management and return to regular diet and thin liquids as tolerated. Patient is below his baseline.        Entered by: Teri Ceja 12/24/2019 8:52 AM

## 2019-12-24 NOTE — PLAN OF CARE
DATE & TIME: 12/24/19 7083-6008  Cognitive Concerns/ Orientation : AOx4, North Korean speaking, some basic English  BEHAVIOR & AGGRESSION TOOL COLOR: green, calm/coop  ABNL VS/O2: VSS on RA  MOBILITY: total care, lift, needs t/r q2 but pt refuses and prefers to do by herself, LEs contracted,  PAIN MANAGMENT: denies, Hx RA  DIET: DD2/thin liq/vegetarian. poor po  BOWEL/BLADDER: Swain and Rectal tube- continues with loose bloody stools  ABNL LAB/BG: Hgb 10.4/stable  DRAIN/DEVICES: Swain/rectal tube, R internal jugular infusing, PIV   TELEMETRY RHYTHM: tele  SKIN: rectum redness, barrier applied   TESTS/PROCEDURES: GI following  D/C DAY/GOALS/PLACE: 2-3 days pending improvement in renal function and placement  OTHER IMPORTANT INFO: Pt sleepy, refuses assistance with turning, likes to do it herself very slowly, denies pain, OT/Speech/SW following. SW to work on placement for pt.

## 2019-12-24 NOTE — CONSULTS
General surgery  Spoke with medicine.  Patient has had a gradual decline in her hemoglobin over the last couple of days.  She is status post coiling of her GDA by interventional radiology.  She is continued to have dark stools and has suggestion of ongoing GI bleed, although much slower.  Given the patient's medical comorbidities, it would be very much in her best interest to try to manage this medically.  If her ongoing blood loss continues, I would strongly recommend consideration for endoscopy prior to any discussion of surgical intervention.  Surgery for upper GI bleeding is very morbid and patient is likely to have a very complicated outcome if this becomes necessary.  Case discussed with Dr. Vasquez.    Doyle Hough MD  General Surgery, Office [phone]    Addendum: Discussed case with Dr. Perales and Dr. Chisholm.  Although surgery may eventually be required, given the patient's multiple comorbidities, it would be less invasive to consider endoscopy and potentially repeat interventional radiology measures prior to taking the patient to surgery.  Surgery was discussed with the patient with the aid of an  and she refused initial discussions.

## 2019-12-24 NOTE — PROGRESS NOTES
GI FOLLOW UP    S:  has left for the day. C/o of epigastric pain today. Continues to have maroon/bloody output noted in rectal tube. Hgb 10.6-->10.4-->9.2 today. Blood pressures high 80's/50's -bolus ordered. Daughter in law at bedside.     Chest/abdominal XR ordered by hospitalist     O;BP (!) 88/51 (BP Location: Left arm)   Pulse 83   Temp 97.6  F (36.4  C) (Oral)   Resp 18   Wt 51.2 kg (112 lb 14 oz)   SpO2 95%   GEN: NAD  HEENT: no scleral icterus  CV: RRR  PULM:CTA  ABD: epigastric tenderness noted without rebound     Labs reviewed.     A/P: 60 yo F with hx of arthritis, prior NSAID use (diclofenac) presented with melena, hypotension with evidence of hemorrhagic shock requiring massive transfusion protocol who ultimately underwent IR embolization of the GDA (12/20). Suspect bleeding from PUD due to NSAIDS. H. Pylori stool test antigen negative (can be false negative if on PPI, will get biopsies at time of EGD).       #GIB. Has been stable over the last few days. Now with concern for bleeding  - decrease in hemoglobin, new abdominal pain, ongoing bleeding via rectal tube, now with hypotension.   -NPO now   -Continue PPI gtt  -Discussed with Dr. Chisholm who has discussed with Dr. Vasquez and IR to determine the plan. Surgery has been consulted. Patient transferring to the ICU. Possible surgical intervention/endoscopy - plan to be determined by Dr. Chisholm and Dr. Hough.   -No NSAIDS      Ana M Lamb, CNP   [facility]  Cell: [phone] until 1PM  Please call Dr. Chisholm after 1PM.

## 2019-12-24 NOTE — PLAN OF CARE
"DATE & TIME: 12/24/19 AM shift  Cognitive Concerns/ Orientation : A/Ox4, Turkish speaking, some basic English  BEHAVIOR & AGGRESSION TOOL COLOR: green, calm/coop  ABNL VS/O2: VSS on RA except hypotensive. BP 80s/50s this morning. MD notified. Bolus ordered and given. BP rechecked and lower after bolus, RRT called at 1 pm and pt was transferred to ICU.   MOBILITY: Total care. T/R q2hr. Very slow with turns, pt has MS and in a lot of pain with movement.   PAIN MANAGMENT: Tylenol x 1 given for aching pain in her R arm. Also, pt has been complaining of abdominal pain. States \"I am so weak\"  DIET: DD2/thin liq/vegetarian. Poor intake. OT/Speech are following.   BOWEL/BLADDER: Swain and Rectal tube- continues with loose bloody stools.  ABNL LAB/BG: Hg down to 8.8 this afternoon.   DRAIN/DEVICES: Swain/rectal tube, R internal jugular infusing, PIV   TELEMETRY RHYTHM: tele  SKIN: rectum redness, barrier applied   TESTS/PROCEDURES: GI following. Surgery consulted.   D/C DAY/GOALS/PLACE: discharge pending.   OTHER IMPORTANT INFO:OT/Speech/SW following. SW to work on placement for pt.  "

## 2019-12-24 NOTE — PROGRESS NOTES
Pt arrived to ICU via cart at approx 1345, a/o able to make needs known via . Report received from house NP lee Valladares. Soon after arrival decision was made to do EGD. norepi started pre-procedure. Dr. Perales notified that HR was increasing quite quickly with little BP improvement and decision was made to intubate. With  pt was told of plan.  After successful intubation pt was scoped with adequate pain and sedation management. 3rd unit of PRBC's infusing. Cont to monitor.

## 2019-12-24 NOTE — PLAN OF CARE
Discharge Planner OT   Patient plan for discharge: Not stated  Current status: pt declined any ADLS, assessed pt for self feeding, with use of lighter utensils and putting broth into cup and use of straw pt was able to complete self feeding with setup only. Pt stated she has all the AE needed for self feeding at home.   Barriers to return to prior living situation: weakness;level of assist/below baseline  Recommendations for discharge: Home w/ continued 24/7 assist per plan established by the Occupational THerapist  Rationale for recommendations: Anticipate pt. will progress to baseline status, w/continued family assist w/all mobility/transfers/I/ADL's.       Entered by: Carolyn Pabon 12/24/2019 8:41 AM

## 2019-12-24 NOTE — CODE/RAPID RESPONSE
[facility]    RRT Note  12/24/2019   Time Called: 1: 03 PM    RRT called for: hypotension     Assessment & Plan    Shock, likely hemorrhagic due to GI loss secondary to NSAID use  History of MTP and code blue event secondary to GI bleed, 12/20/2019  Status post GDA embolization, 12/20/2019  RRT for SBP 60s. Upon  My arrival patient awake, alert, likely oriented, and moving extremities at her baseline. Her skin is pale, warm, and dry. She appears adequately perfused with palpable radial pulse. IV NS bolus initiated. Type specific blood not available and 2- units O- negative ordered for shock. ICU transfer was previously pending. [facility] and Dr. Hough forming best therapeutic option.     INTERVENTIONS:  - 2- units O- negative for shock   - NS bolus- received approximately 250- cc  - CBC, CMP, INR, type and screen STAT   - transfer to ICU  - Dr. Vasquez has spoken with Dr. Peres, ICU attending     Discussed with and defer further cares to Dr. Vasquez, [facility].     Code Status: Full Code     ELLIOTT Venegas, CNP  Hospitalist Service, House Officer  [facility]     Text Page  Pager: [phone]    Allergies   Allergies   Allergen Reactions     No Clinical Screening - See Comments      Unknown medication from the [facility]. Cause hives       Physical Exam   Vital Signs with Ranges:  Temp:  [97.6  F (36.4  C)-98.8  F (37.1  C)] 97.6  F (36.4  C)  Pulse:  [82-93] 93  Heart Rate:  [] 91  Resp:  [18] 18  BP: ()/(41-62) 76/41  SpO2:  [94 %-98 %] 95 %  I/O last 3 completed shifts:  In: 1228 [P.O.:150; I.V.:1078]  Out: 900 [Urine:900]    Constitutional: 59- year old Burkinan speaking female laying in bed without obvious acute distress.   Pulmonary: Lung fields clear, she is hypoxic. No obvious respiratory distress.   Cardiovascular: S1, S2 without obvious murmur, rub, or gallop. She is warm and appears adequately perfused.   GI: Soft, non-distended. Rectal tube with  moderate amount of maroon stool draining.   Skin/Integumen: No obvious concerning rashes or lesions.   Neuro: Awake, alert, likely oriented however full neurologic exam difficult with language barrier. No obvious focal deficits.   Psych:  Cries out when touched/moved.   Extremities: Contracted due to underlying MS.     ABG:  -  Recent Labs   Lab 12/20/19  1225 12/20/19  0340   PH  --  7.21*   PCO2  --  24*   PO2  --  227*   HCO3  --  10*   O2PER 40% FI02 VENT Ventilator       Troponin:    Recent Labs   Lab Test 12/19/19  1909   TROPI 0.043       IMAGING: (X-ray/CT/MRI)   Recent Results (from the past 24 hour(s))   XR Chest 1 View    Narrative    CHEST ONE VIEW  12/24/2019 11:39 AM     HISTORY:  Right upper chest pain.    COMPARISON: 12/20/2019.      Impression    IMPRESSION: Right neck central line tip at the superior vena cava.  Nasogastric tube and ET tube are absent. Opacity at the left lung base  again identified and could be atelectasis or airspace disease. Stable  cardiac silhouette. Trace bibasilar pleural fluid suggested. No new  acute airspace disease identified.    JO ANN RAMOS MD   XR Abdomen 1 View    Narrative    ABDOMEN ONE VIEW  12/24/2019 11:39 AM     HISTORY: Abdominal pain.    COMPARISON: December 20, 2019       Impression    IMPRESSION: Small amount of stool. Nonspecific bowel gas pattern.  Question small bowel obstruction.    JACI GARRETT MD       CBC with Diff:  Recent Labs   Lab Test 12/24/19  1200 12/24/19  0615  12/21/19  0400   WBC  --  12.2*   < > 21.3*   HGB 8.8* 9.2*   < > 10.6*   MCV  --  88   < > 86   PLT  --  143*   < > 119*   INR  --   --   --  1.34*    < > = values in this interval not displayed.        Lactic Acid:    Lab Results   Component Value Date    LACT 2.1 12/20/2019           Comprehensive Metabolic Panel:  Recent Labs   Lab 12/24/19  0615  12/20/19  0935      < > 148*   POTASSIUM 4.4   < > 4.3   CHLORIDE 112*   < > 121*   CO2 15*   < > 16*   ANIONGAP 9   < > 11   GLC  111*   < > 172*   BUN 95*   < > 98*   CR 2.63*   < > 2.17*   GFRESTIMATED 19*   < > 24*   GFRESTBLACK 22*   < > 28*   MORGAN 6.5*   < > 8.6   MAG 1.4*   < > 1.4*   PHOS 4.0   < > 7.8*   PROTTOTAL  --   --  4.5*   ALBUMIN  --   --  2.3*   BILITOTAL  --   --  0.8   ALKPHOS  --   --  45   AST  --   --  39   ALT  --   --  23    < > = values in this interval not displayed.       INR:    Recent Labs   Lab Test 12/21/19  0400   INR 1.34*       Time Spent on this Encounter   I spent 55 minutes of critical care time on the unit/floor managing the care of Eliza Thurman. Upon evaluation, this patient had a high probability of imminent or life-threatening deterioration due to hemorrhagic shock, which required my direct attention, intervention, and personal management. 100% of my time was spent at the bedside counseling the patient and/or coordinating care regarding services listed in this note.

## 2019-12-24 NOTE — PROGRESS NOTES
Patient transferred to ICU for hypotension and recurrent red blood per rectum.  Getting 2 units PRBC  uncrossmatched for BP in 60s' and HR 90. Now HR 90 and PB 85/60  Patient is frail and has bilateral contractures and atrophy seconday to advanced MS but feet and hands are warm and no mottling.  Red blood in rectal tubing.      Recent Labs   Lab 12/24/19  1321 12/24/19  1200 12/24/19  0615 12/23/19  0610 12/22/19  0405   HGB 7.2* 8.8* 9.2* 10.4* 10.6*     INR 1.2       A: Recurrent GIB, likely upper as there was GDA bleeding seen on IR embolization 4 days ago but has never had EGD to assess ulcer/vessel. Also has WOLFGANG and metabolic acidosis seconday to WOLFGANG.   P: PPI  PRBC to keep Hgb > 8  Serial Lactates  EGD--will intubate if necessary   DDAVP for uremic bleeding     -------------------------------------------------------  2:25 PM  Updated Note    D/w Dr Chisholm and Gianluca.  Patient receiving second unit of PRBC.  BP 90  HR 90.    P: Attempt EGD today,intubate if necessary, depending on exam findings will determine plan     -------------------------------------------------------  3:00 PM  Updated Note  VBG acceptable, needing 0.1 norepinephrine. Will proceed with EGD and intubate if can't sedate safely or large amounts of blood seen.       -------------------------------------------------------  3:39 PM  Updated Note  Now on increasing doses of norepinephrine and HR to 120.  Will need to intubate to safely do EGD.      35 minutes CC time so far today independent of procedures for hypotension and hemorrhage.      -------------------------------------------------------  4:45 PM  Updated Note  Completed EGD and large clot with oozing seen in duodenum in area of GDA.  No intervention done.    Hemoglobin at 10 after 3rd unit, respiratory acidosis on ventilator so I increased RR to 22.   Will complete 4th unit and then recheck Hgb and lactate and VBG and determine need for further intervention.  Keep on ventilator .   Measuring BP via cuff but would not be an easy arterial line as she has extensive contractures.      -------------------------------------------------------  5:19 PM  Updated Note  Spoke with son, Lashell, who will come in to see his mother and get update from intensivist.

## 2019-12-25 NOTE — PROVIDER NOTIFICATION
Pt cont to have minimal UOP - 11 mL overnight. MD notified. Levophed and Henry to maintain MAP > 65. HR previously 120's-130's, now 110's. Pt appears comfortable on versed gtt but grimaces when staff reposition BLE. Patient's son updated last night at bedside, son plans to return around 0900 for MD update with  present.

## 2019-12-25 NOTE — PLAN OF CARE
Pt rested well today, weaned off phenylephrine per MAR, titrating levophed as able, see MAR, pt remains on vent, well tolerated with versed for sedation, prn dilaudid for pain, see MAR, pt to go to IR for arteriogram with possible coiling, continuing to trend hgb and lactate.

## 2019-12-25 NOTE — PROGRESS NOTES
Intubation Note    Date of intubation:12/24/2019  Time of intubation: 1518  Location of intubation:ICU  Intubated by:  Size of ETT:7.0  Location (cm) at lip:24 cm    ETT placement confirmed by:EtCO2 color change and bilateral breath sound.   Comments:Pt transferred from station 33 due to hypotension. Pt was then intubated for airway protection.  Vent settings per MD order.  Ventilation Mode: CMV/AC  (Continuous Mandatory Ventilation/ Assist Control)  FiO2 (%): 40 %  Rate Set (breaths/minute): 22 breaths/min  Tidal Volume Set (mL): 500 mL  PEEP (cm H2O): 5 cmH2O  Oxygen Concentration (%): 40 %  Resp: 22      12/24/2019  Teri Stanley RT

## 2019-12-25 NOTE — PROGRESS NOTES
[facility] ICU RESPIRATORY NOTE        Date of Admission: 12/20/2019    Date of Intubation (most recent): 12/24/19    Reason for Mechanical Ventilation: Airway Protection    Number of Days on Mechanical Ventilation: 2    Met Criteria for Spontaneous Breathing Trial: No    Reason for No Spontaneous Breathing Trial: Per MD    Significant Events Today: None     ABG Results:   Recent Labs   Lab 12/24/19  1606 12/20/19  1225 12/20/19  0340   PH  --   --  7.21*   PCO2  --   --  24*   PO2  --   --  227*   HCO3  --   --  10*   O2PER 80% 40% FI02 VENT Ventilator       Current Vent Settings: Ventilation Mode: CMV/AC  (Continuous Mandatory Ventilation/ Assist Control)  FiO2 (%): 40 %  Rate Set (breaths/minute): 22 breaths/min  Tidal Volume Set (mL): 500 mL  PEEP (cm H2O): 5 cmH2O  Oxygen Concentration (%): 40 %  Resp: 22        Plan: Will continue full ventilatory support for now and assess for weaning readiness daily.               Julieta Bowers, RT

## 2019-12-25 NOTE — PROGRESS NOTES
Brief Tele-Intensivist note  I had a long talk with son at bedside to update him on her status. He will be in tomorrow morning and we are working to get an , too.    pablo matos  December 24, 2019

## 2019-12-25 NOTE — PROGRESS NOTES
GASTROENTEROLOGY PROGRESS NOTE    CC: Gastro duodenal artery bleed, S/p IR coil and gel foam    SUBJECTIVE:  Intubated and sedated  Talked with son    OBJECTIVE:  General Appearance:  Sedated in NAD  BP 92/62   Pulse 108   Temp 99  F (37.2  C)   Resp 22   Wt 54.9 kg (121 lb 0.5 oz)   SpO2 100%   Temp (24hrs), Av.4  F (36.9  C), Min:96.3  F (35.7  C), Max:99.5  F (37.5  C)    Patient Vitals for the past 72 hrs:   Weight   19 0400 54.9 kg (121 lb 0.5 oz)   19 0705 51.2 kg (112 lb 14 oz)       Intake/Output Summary (Last 24 hours) at 2019 1112  Last data filed at 2019 1000  Gross per 24 hour   Intake 2820.81 ml   Output 1533 ml   Net 1287.81 ml       PHYSICAL EXAM  Abdomen: Abdomen soft,  BS normal. No masses, organomegaly        Additional Comments:  ROS, FH, SH: See initial GI consult for details.    I have reviewed the patient's new clinical lab results:    Recent Labs   Lab Test 19  1008 19  0555 19  0200  19  1321  19  0400 19  0030   WBC 21.5* 20.5* 19.2*   < > 11.3*   < > 21.3* 20.0*   HGB 10.7* 11.8 12.7   < > 7.2*   < > 10.6* 10.4*   MCV 87 87 87   < > 87   < > 86 86   * 117* 110*   < > 144*   < > 119* 118*   INR  --   --   --   --  1.26*  --  1.34* 1.31*    < > = values in this interval not displayed.     Recent Labs   Lab Test 19  0555 19  1321 19  0615   POTASSIUM 4.7 4.6 4.4   CHLORIDE 112* 115* 112*   CO2 12* 14* 15*   BUN 98* 99* 95*   ANIONGAP 13 10 9     Recent Labs   Lab Test 19  1321 19  0935 19  0555  19  1909   ALBUMIN 1.2* 2.3* 1.8*   < > 2.0*   BILITOTAL 0.2 0.8 0.5   < > 0.2   ALT 12 23 22   < > 24   AST 12 39 28   < > 33   LIPASE  --   --   --   --  209    < > = values in this interval not displayed.     EGD   Impression:               - Normal esophagus.                             - Red blood in the gastric body. Fluid aspiration                             performed.                              - Blood in the duodenal bulb and bleeding from the                             clot site. Unable to treat a bleed of the                             Gastrodudodenal artery with endoscopic means and no                             surgical back up.                             - Multiple non-obstructing non-bleeding duodenal                             ulcers with no stigmata of bleeding in the second                             portion of the duodenum.     Active Problems:    Hemorrhagic shock (H)    Assessment: Hemodynamically stable but still bleedding    Plan: Interventional radiology and surgery to assess.      Odilia Chisholm MD  [facility]  Office

## 2019-12-25 NOTE — PROGRESS NOTES
[facility]  General Surgery Progress Note        Brandon Hough MD, MD   12/25/2019        Interval History:      Patient was intubated last night before upper endoscopy.  Endoscopy revealed clot at the likely site of ulcer.  No interventions taken.  Patient has responded appropriately to blood transfusion yesterday.  Hemoglobin has drifted from 13.5-11.8 over the last 12 hours.  Her pressor requirements are decreasing and her blood gas is improving.  Continues to have dark stools.         Assessment and Plan:      59-year-old female with multiple comorbidities and persistent slow GI bleed after coiling of the GDA 5 days ago.  Her bleed certainly appears to be slowing.  Should continue with aggressive medical management with proton pump inhibitors and nothing by mouth.  I think if bleeding continues over the next 24 hours, I would discuss the matter with interventional radiology again and see if there is a small persistent area of bleeding or a collateral vessel which is bleeding.  Certainly surgery is always a potential option for intervention, but this is the most morbid of all of our possible interventions and would likely be fraught with postoperative complications.  I reserve this option as a last resort.                   Physical Exam:      Blood pressure 99/61, pulse 94, temperature 99.1  F (37.3  C), resp. rate 22, weight 54.9 kg (121 lb 0.5 oz), SpO2 100 %.  Vitals:    12/22/19 0500 12/23/19 0705 12/25/19 0400   Weight: 47.3 kg (104 lb 4.4 oz) 51.2 kg (112 lb 14 oz) 54.9 kg (121 lb 0.5 oz)     Vital Signs with Ranges  Temp:  [96.3  F (35.7  C)-99.5  F (37.5  C)] 99.1  F (37.3  C)  Pulse:  [] 94  Heart Rate:  [] 96  Resp:  [0-45] 22  BP: ()/(25-91) 99/61  FiO2 (%):  [40 %-80 %] 40 %  SpO2:  [95 %-100 %] 100 %  I/O's Last 24 hours  I/O last 3 completed shifts:  In: 3794.09 [I.V.:3202.42]  Out: 2016 [Urine:841; Stool:1175]    Constitutional:  Patient intubated,  sedated   Lungs:  On full vent support   Cardiovascular:  Tachycardic in the 110 range, pressor requirements decreased   Abdomen:  Soft, mildly distended.   Skin:  Warm, dry   Imaging:           Medications:          chlorhexidine  15 mL Mouth/Throat Q12H     fentaNYL  50 mcg Intravenous Once within 24 hrs     heparin lock flush  5-10 mL Intracatheter Q24H     insulin aspart  1-7 Units Subcutaneous TID AC     insulin aspart  1-5 Units Subcutaneous At Bedtime     midazolam  2 mg Intravenous Once within 24 hrs            Data:      All new lab and imaging data was reviewed.   Recent Labs   Lab Test 12/25/19  0555 12/25/19  0200 12/24/19  2203  12/24/19  1321  12/21/19  0400 12/21/19  0030   WBC 20.5* 19.2* 17.7*  --  11.3*   < > 21.3* 20.0*   HGB 11.8 12.7 13.5   < > 7.2*   < > 10.6* 10.4*   MCV 87 87 89  --  87   < > 86 86   * 110* 98*  --  144*   < > 119* 118*   INR  --   --   --   --  1.26*  --  1.34* 1.31*    < > = values in this interval not displayed.

## 2019-12-25 NOTE — IR NOTE
Angiogram did not show any definite active extravasation. Some leakage of contrast from an SMA branch in 2nd portion of duodenum like secondary to guidewire perforation embolized with mary liquid embolic agent. A right gastric branch supplying proximal duodenum embolized with mary.    Plan:  Monitor for persistent bleeding  D/W Dr. Hough from surgery.

## 2019-12-25 NOTE — PLAN OF CARE
Pt receiving prn versed. Dr. Perales notified of pt's requirement for versed prn for sedation/comfort- versed gtt ordered.  Pt restless, attempting to pull out ETT. GI bleeding appears to have slowed some. Rectal tube in place. Stool still dark red.  MD spoke with pt's son. Levo remains at 0.4mcg/kg/min. turned and repo q2 hours. Cont to monitor.

## 2019-12-25 NOTE — PROVIDER NOTIFICATION
Notified Dr. Perales of continued low urine output, (had previously discussed this with Dr. Castle at 0800) MDs aware/expect this, also discussed with Dr. Perales level of sedation, status of vasopressors, trending blood sugar and clarified lab orders.  Orders received

## 2019-12-25 NOTE — PROGRESS NOTES
Eliza Thurman remains in the ICU this morning and is still intubated and on vasopressors. The hospitalist service will sign off for now. Please re-consult the hospitalist service when appropriate to resume care.  Darinel Vasquez MD

## 2019-12-25 NOTE — PROGRESS NOTES
[facility] ICU RESPIRATORY NOTE  Date of Admission: 12/20/19  Date of Intubation (most recent): 12/24/19  Reason for Mechanical Ventilation: Airway protection   Number of Days on Mechanical Ventilation: 2  Met Criteria for Pressure Support Trial: No  Length of Pressure Support Trial:    Reason for Stopping Pressure Support Trial:  Reason for No Pressure Support Trial: Per MD     Significant Events Today: None overnight     ABG Results:   Recent Labs   Lab 12/24/19  1606 12/20/19  1225 12/20/19  0340   PH  --   --  7.21*   PCO2  --   --  24*   PO2  --   --  227*   HCO3  --   --  10*   O2PER 80% 40% FI02 VENT Ventilator     Ventilation Mode: CMV/AC  (Continuous Mandatory Ventilation/ Assist Control)  FiO2 (%): 40 %  Rate Set (breaths/minute): 22 breaths/min  Tidal Volume Set (mL): 500 mL  PEEP (cm H2O): 5 cmH2O  Oxygen Concentration (%): 40 %  Resp: 26    Tho Bullock, RT       ETT appearance on chest x-ray:     Skin Assessment:     Plan:

## 2019-12-25 NOTE — IR NOTE
Interventional Radiology Intra-procedural Nursing Note    Patient Name: Eliza Thurman  Medical Record Number: [id number]  Today's Date: December 25, 2019    Start Time: 1547  End of procedure time: 1740  Procedure: Visceral Angiogram with Embolization  Report given to: [facility]  Time pt departs:  1755  : Russian IS at bedside    Other Notes: Pt transferred to IR table. Prepped and draped appropriately. Monitoring equipment applied.Pt. On mechanical ventilator, sedation with Versed infusion. Pt. Appears comfortable with open eyes when spoke to. Patients son at bedside for consent with Emily IS. Timeout recorded.    6f sheath to Right Femoral Artery. Removed at 1740. A 6f angioseal was placed to Right Femoral Artery. Site dressed with gauze and covered with tegaderm. Site C/D/I, soft. Hemostasis at 1740.    Medications administered:    Fentanyl 125 mcg IVP  Versed1 mg IVP    Versed Infusion   Norepi infusion   Protonix infusion   (see mar from ICU)

## 2019-12-25 NOTE — PROGRESS NOTES
Critical Care Progress Note      12/25/2019    Name: Eliza Thurman MRN#: [id number]   Age: 59 year old YOB: 1960                    Problem List:   Active Problems:    Hemorrhagic shock (H)  upper GI bleeding -- recurrent   WOLFGANG         Summary/Hospital Course:     59F Nigerien speaking with pmh of MS and gastric ulcer disease now presented to OSH with melena and emesis on 12/20.  She was very unstable prior to transfer and apparently cardiac arrested just prior to transport. Got  MTP   ~ 10 units PRBC.  On arrival here she was taken immediately to IR where embolization was performed and hemostasis was apparently achieved.    Was on lazar and doing ok but then had recurrent hypotension and melena on 12/24 so back to ICU and got 4 units PRBC,  DDAVP, PPI drip and needing intubation prior to EGD for acidosis and hypotension and scope showed oozing clot in duodenum in area of expected GDA ulcer.  No intervention done.      Overnight, slowly dropping Hgb but on less pressors.  Remains on ventilator and versed.        Assessment and plan :     Eliza Thurman IS a 59 year old female admitted on 12/20/2019 for upper GI bleeding.     Neurology/Psychiatry:   1. H/o advanced MS--non ambulatory   3. Sedation:  Versed at 2 mg  RASS goal  0-1  Achieved.   4. S/p cardiac arrest--brief seconday to hemorrhagic shock. 5 days ago     Cardiovascular:   1  Hemorrhagic shock:  Recurrent,  Weaning down on pressors, lactate last normal.      Pulmonary/Ventilator Management:   1. Intubated 12/24 for shock.   Mild acidosis,  Needs to stay on ventilator until no more procedures are imminent.  Increasing effusions seconday to volume overload     GI and Nutrition: Recurrent GDA ulcer bleeding--s/p 4 units PRBC yesterday with peak Hgb 132 now 10.  Still with hematochezia.    P: continue serial Hgb, .if persistent bleeding repeat embolization by IR and then, if needed, surgical procedure. Continue protonix drip      Renal/Fluids/Electrolytes:   1. WOLFGANG with uremia but remarkably stable BUN and CR despite repeated shock episodes.  Low urinary output.  More acidotic   Will add  D10 at 20 mlhr seconday to low glucoses   Will give BID 1 amp sod bicarb.  K ok     Infectious Disease:   1. No acute issues    Endocrine:       Hematology/Oncology:   Anemia seconday to bleeding  Platelets ok.    INR only slightly elevated  Yesterday. Will recheck in afternoon        IV/Access:   1. Venous access - central line    ICU Prophylaxis:   1. DVT: mech  2. Feeding - npo  3.  Disposition -ICU            Interim History:     Intubated and scoped yesterday for recurrent melena          [place]ooten Medications:       chlorhexidine  15 mL Mouth/Throat Q12H     heparin lock flush  5-10 mL Intracatheter Q24H     insulin aspart  1-7 Units Subcutaneous TID AC     insulin aspart  1-5 Units Subcutaneous At Bedtime     midazolam  2 mg Intravenous Once within 24 hrs       dextrose       midazolam 2 mg/hr (12/25/19 1034)     norepinephrine 0.2 mcg/kg/min (12/25/19 0729)     pantoprazole (PROTONIX) infusion ADULT/PEDS GREATER than or EQUAL to 45 kg 8 mg/hr (12/25/19 1034)     phenylephrine Stopped (12/25/19 1246)     sodium chloride 20 mL/hr at 12/25/19 1035               Physical Examination:   Weight up 4 kg since yesterday.     Wt Readings from Last 4 Encounters:   12/25/19 54.9 kg (121 lb 0.5 oz)   12/20/19 41.5 kg (91 lb 7.9 oz)     BP - Mean:  [] 79  Ventilation Mode: CMV/AC  (Continuous Mandatory Ventilation/ Assist Control)  FiO2 (%): 40 %  Rate Set (breaths/minute): 22 breaths/min  Tidal Volume Set (mL): 500 mL  PEEP (cm H2O): 5 cmH2O  Oxygen Concentration (%): 40 %  Resp: 22        GEN: opens eyes to voice.    HEENT: ETT ok   NECK: right IJ   PULM: uclear anteriorly    CV/COR: RRR S1S2 no gallop,  No rub, no murmur  ABD: soft nontender, , no mass  EXT:  3+  Edema,   Warm x4 -contractures and atrophy bilateral   SKIN: no obvious rash  LINES: clean,  dry intact         Data:   All data and imaging reviewed     ROUTINE ICU LABS (Last four results)  CMP  Recent Labs   Lab 12/25/19  0555 12/24/19  1321 12/24/19  0615 12/23/19  0610 12/22/19  0405  12/20/19  0935 12/20/19  0555 12/20/19  0340    139 136 142 151*   < > 148* 149* 146*   POTASSIUM 4.7 4.6 4.4 4.5 4.7   < > 4.3 4.9 5.2   CHLORIDE 112* 115* 112* 116* 125*   < > 121* 123* 121*   CO2 12* 14* 15* 17* 17*   < > 16* 14* 11*   ANIONGAP 13 10 9 9 9   < > 11 12 14   GLC 83 112* 111* 91 108*   < > 172* 253* 216*   BUN 98* 99* 95* 105* 117*   < > 98* 104* 130*   CR 2.72* 2.62* 2.63* 2.77* 2.95*   < > 2.17* 2.26* 2.49*   GFRESTIMATED 18* 19* 19* 18* 17*   < > 24* 23* 20*   GFRESTBLACK 21* 22* 22* 21* 19*   < > 28* 27* 24*   MORGAN 6.3* 6.3* 6.5* 6.9* 7.2*   < > 8.6 8.1* 6.5*   MAG 1.7  --  1.4* 1.7 1.9   < > 1.4*  --   --    PHOS 4.8*  --  4.0 4.0 5.8*   < > 7.8*  --   --    PROTTOTAL  --  3.2*  --   --   --   --  4.5* 3.9* 4.3*   ALBUMIN  --  1.2*  --   --   --   --  2.3* 1.8* 1.8*   BILITOTAL  --  0.2  --   --   --   --  0.8 0.5 0.4   ALKPHOS  --  62  --   --   --   --  45 42 43   AST  --  12  --   --   --   --  39 28 22   ALT  --  12  --   --   --   --  23 22 17    < > = values in this interval not displayed.     CBC  Recent Labs   Lab 12/25/19  1008 12/25/19  0555 12/25/19  0200 12/24/19  2203   WBC 21.5* 20.5* 19.2* 17.7*   RBC 3.49* 3.91 4.17 4.42   HGB 10.7* 11.8 12.7 13.5   HCT 30.5* 34.1* 36.1 39.1   MCV 87 87 87 89   MCH 30.7 30.2 30.5 30.5   MCHC 35.1 34.6 35.2 34.5   RDW 15.1* 15.1* 15.0 15.1*   * 117* 110* 98*     INR  Recent Labs   Lab 12/24/19  1321 12/21/19  0400 12/21/19  0030 12/20/19  1814   INR 1.26* 1.34* 1.31* 1.34*     Arterial Blood Gas  Recent Labs   Lab 12/24/19  1606 12/20/19  1225 12/20/19  0340   PH  --   --  7.21*   PCO2  --   --  24*   PO2  --   --  227*   HCO3  --   --  10*   O2PER 80% 40% FI02 VENT Ventilator       All cultures:  Recent Labs   Lab 12/20/19  0100  12/19/19  1908   CULT No growth after 5 days No growth     Recent Results (from the past 24 hour(s))   XR Chest Port 1 View    Narrative    CHEST PORTABLE ONE VIEW  12/24/2019 5:13 PM     HISTORY: Intubated.    COMPARISON: Chest x-ray on same day earlier      Impression    IMPRESSION: Endotracheal tube tip projects over lower thoracic trachea  approximately 4 cm from the sheila. Right IJ central catheter tip  projects over low SVC. Stable cardiomediastinal silhouette. Small left  and trace left pleural effusions and associated basilar  atelectasis/consolidation. Linear density along the right mid chest,  likely represent skinfold. Degenerative changes of the bilateral  shoulder joints.    DANII MERCHANT MD       Labs personally reviewed/interpreted:  See a/p      35 minimal CC time for shock, hemorrhage, respiratory failure

## 2019-12-26 NOTE — PLAN OF CARE
Pt lightly sedate with 4mg/hr Versed.  Levophed at 0.2 mcg/kg/min.  Protonix drip.  Grimacing with touch/repositioning in bed.  Post I.R. 0.9% NS 75ml/hr to avoid fluid overload.  Right groin femoral site dressing CDI no bruit/hematoma with dopplerable pulses.  Making adequate urine roughly 30/hr.  Hgb overnight 8.4.  dark bloody stool via rectal tube.  Thick creamy sputum via ETT.

## 2019-12-26 NOTE — PLAN OF CARE
Speech Language Therapy Discharge Summary    Reason for therapy discharge:    Change in medical status.    Progress towards therapy goal(s). See goals on Care Plan in [facility] electronic health record for goal details.  Goals not met.  Barriers to achieving goals:   pt intubated.    Therapy recommendation(s):    No further therapy is recommended.

## 2019-12-26 NOTE — PROVIDER NOTIFICATION
Concern for fluid overload post I.R.  Clarification regarding post I.R. order for 0.9% NS at 100ml/hr.  New order for decrease in 0.9%NS to 75m,l/hr received per Dr. Bazan.

## 2019-12-26 NOTE — PROGRESS NOTES
[facility] ICU RESPIRATORY NOTE        Date of Admission: 12/20/2019    Date of Intubation (most recent): 12/24/2019    Reason for Mechanical Ventilation: Airway protection     Number of Days on Mechanical Ventilation: 3    Met Criteria for Spontaneous Breathing Trial: No   Reason for No Spontaneous Breathing Trial:  Per MD    Significant Events Today:  None today's shift    ABG Results:   Recent Labs   Lab 12/26/19  1000 12/24/19  1606 12/20/19  1225 12/20/19  0340   PH  --   --   --  7.21*   PCO2  --   --   --  24*   PO2  --   --   --  227*   HCO3  --   --   --  10*   O2PER 30% 80% 40% FI02 VENT Ventilator       Current Vent Settings: Ventilation Mode: CMV/AC  (Continuous Mandatory Ventilation/ Assist Control)  FiO2 (%): 30 %  Rate Set (breaths/minute): 18 breaths/min  Tidal Volume Set (mL): 500 mL  PEEP (cm H2O): 5 cmH2O  Oxygen Concentration (%): 30 %  Resp: 22      Skin Assessment: Good     Plan: pt continue on full ventilator support and will continue to assess daily for weaning.     Sathish Bhakta, RT

## 2019-12-26 NOTE — PROGRESS NOTES
[facility]  General Surgery Progress Note        Brandon Hough MD, MD   12/26/2019        Interval History:      Patient underwent repeat angiography with interventional radiology yesterday.  Several small collateral branches were embolized.  Patient continues to have slow drift of hemoglobin but is becoming more hemodynamically stable.  Pressor requirements down, heart rate down.         Assessment and Plan:      59-year-old female with multiple medical problems and bleeding duodenal ulcer.  She relatively recently underwent repeat embolization of the vasculature around the duodenum.  Although her hemoglobin continues to drift, it is relatively slow.  I would like to give this an opportunity to play itself out in the event that her bleeding stops.  She may require an additional transfusion during this interval.  If she continues to drift down at this trajectory, we may be left with no choice but to operate.  This would potentially be scheduled tomorrow based on the results of her clinical status and lab evaluation over the next 24 hours.  Case was discussed with the intensivist.                   Physical Exam:      Blood pressure 104/58, pulse 121, temperature 98.6  F (37  C), resp. rate (!) 41, weight 56.4 kg (124 lb 5.4 oz), SpO2 94 %.  Vitals:    12/23/19 0705 12/25/19 0400 12/26/19 0200   Weight: 51.2 kg (112 lb 14 oz) 54.9 kg (121 lb 0.5 oz) 56.4 kg (124 lb 5.4 oz)     Vital Signs with Ranges  Temp:  [96.6  F (35.9  C)-99  F (37.2  C)] 98.6  F (37  C)  Pulse:  [] 121  Heart Rate:  [] 123  Resp:  [15-41] 41  BP: ()/(52-95) 104/58  FiO2 (%):  [30 %-40 %] 30 %  SpO2:  [56 %-100 %] 94 %  I/O's Last 24 hours  I/O last 3 completed shifts:  In: 1212.69 [I.V.:1212.69]  Out: 525 [Urine:250; Stool:275]    Constitutional:  Patient intubated, sedated   Lungs:  On full ventilatory support   Cardiovascular:  Blood pressure, heart rate normalizing.  Still on some norepinephrine.    Abdomen:  Soft, nondistended.  Still having dark stools.   Skin:  Warm, dry   Imaging:           Medications:          chlorhexidine  15 mL Mouth/Throat Q12H     heparin lock flush  5-10 mL Intracatheter Q24H     insulin aspart  1-7 Units Subcutaneous TID AC     insulin aspart  1-5 Units Subcutaneous At Bedtime     sodium bicarbonate  50 mEq Intravenous BID            Data:      All new lab and imaging data was reviewed.   Recent Labs   Lab Test 12/26/19  0825 12/26/19  0540 12/26/19  0000 12/25/19  1830 12/25/19  1008 12/25/19  0555 12/25/19  0200  12/24/19  1321   WBC  --   --   --   --  21.5* 20.5* 19.2*   < > 11.3*   HGB 8.0*  --  8.4* 9.4* 10.7* 11.8 12.7   < > 7.2*   MCV  --   --   --   --  87 87 87   < > 87   PLT  --   --   --   --  124* 117* 110*   < > 144*   INR  --  1.36*  --  1.28*  --   --   --   --  1.26*    < > = values in this interval not displayed.

## 2019-12-26 NOTE — PLAN OF CARE
Continues on full vent support and 4mg/hr of Versed.  Sedation vacation attempted, but patient became too restless. At times, pt is restless on the current gtt rate of Versed.

## 2019-12-26 NOTE — PROGRESS NOTES
GASTROENTEROLOGY PROGRESS NOTE    CC: Gastroduodenal artery bleed and rebleed    SUBJECTIVE:  Patient stable but still intubated and on pressors  Blood in rectal tube    OBJECTIVE:  General Appearance:  Very thin women sedated in NAD  BP 98/51   Pulse 111   Temp 98.6  F (37  C) (Bladder)   Resp 18   Wt 56.4 kg (124 lb 5.4 oz)   SpO2 97%   Temp (24hrs), Av.8  F (36.6  C), Min:96.6  F (35.9  C), Max:98.6  F (37  C)    Patient Vitals for the past 72 hrs:   Weight   19 0200 56.4 kg (124 lb 5.4 oz)   19 0400 54.9 kg (121 lb 0.5 oz)       Intake/Output Summary (Last 24 hours) at 2019 1528  Last data filed at 2019 1400  Gross per 24 hour   Intake 1074.07 ml   Output 570 ml   Net 504.07 ml       PHYSICAL EXAM  Abdomen: Abdomen soft, non-tender. BS normal. No masses, organomegaly        Additional Comments:  ROS, FH, SH: See initial GI consult for details.    I have reviewed the patient's new clinical lab results:    Recent Labs   Lab Test 19  0825 19  0540 19  0000 19  1830 19  1008 19  0555 19  0200  19  1321   WBC  --   --   --   --  21.5* 20.5* 19.2*   < > 11.3*   HGB 8.0*  --  8.4* 9.4* 10.7* 11.8 12.7   < > 7.2*   MCV  --   --   --   --  87 87 87   < > 87   PLT  --   --   --   --  124* 117* 110*   < > 144*   INR  --  1.36*  --  1.28*  --   --   --   --  1.26*    < > = values in this interval not displayed.     Recent Labs   Lab Test 19  0540 19  0555 19  1321   POTASSIUM 4.6 4.7 4.6   CHLORIDE 113* 112* 115*   CO2 12* 12* 14*   * 98* 99*   ANIONGAP 13 13 10     Recent Labs   Lab Test 19  1321 19  0935 19  0555  19  1909   ALBUMIN 1.2* 2.3* 1.8*   < > 2.0*   BILITOTAL 0.2 0.8 0.5   < > 0.2   ALT 12 23 22   < > 24   AST 12 39 28   < > 33   LIPASE  --   --   --   --  209    < > = values in this interval not displayed.     EGD          Impression:               - Normal esophagus.                              - Red blood in the gastric body. Fluid aspiration                             performed.                             - Blood in the duodenal bulb and bleeding from the                             clot site. Unable to treat a bleed of the                             Gastrodudodenal artery with endoscopic means and no                             surgical back up.                             - Multiple non-obstructing non-bleeding duodenal                             ulcers with no stigmata of bleeding in the second                             portion of the duodenum.       IR procedure 12/25      Angiogram did not show any definite active extravasation. Some leakage of contrast from an SMA branch in 2nd portion of duodenum like secondary to guidewire perforation embolized with mary liquid embolic agent. A right gastric branch supplying proximal duodenum embolized with mary.             Active Problems:    Hemorrhagic shock secondary to gastodoudenal atery bleed and rebleed.  More stable after second IR procedure or could be time.  Continue to support with transfusions  Patient still full code.      Odilia Chisholm MD  [facility]  Office

## 2019-12-26 NOTE — PROGRESS NOTES
[facility] ICU RESPIRATORY NOTE        Date of Admission: 12/20/2019    Date of Intubation (most recent):12/24/2019    Reason for Mechanical Ventilation:Airway protection    Number of Days on Mechanical Ventilation:2    Met Criteria for Spontaneous Breathing Trial:No  Reason for No Spontaneous Breathing Trial:Per MD    Significant Events Today:Pt was transported to IR.    ABG Results:   Recent Labs   Lab 12/24/19  1606 12/20/19  1225 12/20/19  0340   PH  --   --  7.21*   PCO2  --   --  24*   PO2  --   --  227*   HCO3  --   --  10*   O2PER 80% 40% FI02 VENT Ventilator       Current Vent Settings: Ventilation Mode: CMV/AC  (Continuous Mandatory Ventilation/ Assist Control)  FiO2 (%): 30 %  Rate Set (breaths/minute): 22 breaths/min  Tidal Volume Set (mL): 500 mL  PEEP (cm H2O): 5 cmH2O  Oxygen Concentration (%): 30 %  Resp: 22    Plan:Will cont full vent support for now and will assess for weaning readiness daily.    Teri Stanley, RT

## 2019-12-26 NOTE — PROGRESS NOTES
Interventional Radiology Progress Note:  Date: 2019   Patient name: Eliza Thurman  MRN:[id number]  :  1960        History: Patient seen in ICU, remains intubated after repeat EGD and second encounter in IR for embolization of slight contrast leakage from SMA branch in 2nd portion of duodenum.  Also, right gastric branch embolized to limit proximal duodenum blood flow.     Physical Exam: Left femoral access site c/d/i, new right femoral access site also c/d/i  Abd- ndnt, no nataly-umbilical tenderness  Extremities- 2+ edema in the lower extremities, palpable DP through edema     Lab Results   Component Value Date    WBC 21.5 2019     Lab Results   Component Value Date    RBC 3.49 2019     Lab Results   Component Value Date    HGB 8.0 2019     Lab Results   Component Value Date    HCT 30.5 2019     Lab Results   Component Value Date    MCV 87 2019     Lab Results   Component Value Date    MCH 30.7 2019     Lab Results   Component Value Date    MCHC 35.1 2019     Lab Results   Component Value Date    RDW 15.1 2019     Lab Results   Component Value Date     2019         Assessment: s/p coil embolization of GDA and pancreaticoduodenal arteries for large UGI bleeding now with return to IR for embolization of SMA branch and right gastric branch for contrast leakage without definite extravasation.     Plan: Continue cares per ICU, GI and Gen surgery following.  Access sites in groin stable. Per notes, if re-bleeding noted may need surgical intervention.     Khanh Arambula PA-C  Interventional Radiology

## 2019-12-26 NOTE — PROGRESS NOTES
Critical Care Progress Note      12/26/2019    Name: Eliza Thurman MRN#: [id number]   Age: 59 year old YOB: 1960                    Problem List:   Active Problems:    Hemorrhagic shock (H)  upper GI bleeding -- recurrent   WOLFGANG  Intubated          Summary/Hospital Course:     59F Sammarinese speaking with pmh of MS and gastric ulcer disease now presented to OSH with melena and emesis on 12/20.  She was very unstable prior to transfer and apparently cardiac arrested just prior to transport. Got  MTP   ~ 10 units PRBC.  On arrival here she was taken immediately to IR where embolization was performed and hemostasis was apparently achieved.    Was on lazar and doing ok but then had recurrent hypotension and melena on 12/24 so back to ICU and got 4 units PRBC,  DDAVP, PPI drip and needing intubation prior to EGD for acidosis and hypotension and scope showed oozing clot in duodenum in area of expected GDA ulcer.  No intervention done.      Overnight, got another IR injection in SMA but less obvious bleeding source seen    No transfusions in 24 hours but still with hematochezia       Assessment and plan :     Eliza Thurman IS a 59 year old female admitted on 12/20/2019 for upper GI bleeding.     Neurology/Psychiatry:   1. H/o advanced MS--non ambulatory   3. Sedation:  Versed at 4 mg  RASS goal  0-1  But now -3.   4. S/p cardiac arrest--brief seconday to hemorrhagic shock. 5 days ago   P: sedation vacation     Cardiovascular:   1  Hemorrhagic shock:  Recurrent,  Still on norepinephrine but seems well perfused l actate last normal.      Pulmonary/Ventilator Management:   1. Intubated 12/24 for shock.   Mild acidosis,  Needs to stay on ventilator until no more procedures are imminent.  Increasing effusions seconday to volume overload   P: decrease RR to 18     GI and Nutrition: Recurrent GDA ulcer bleeding--s/p 4 units PRBC Tuesday with peak Hgb 13 now 8 .  Still with hematochezia.    P: continue serial  Hgb, PPI, IR intervention x 2.  Only intervention left if still bleeding is surgery.      Renal/Fluids/Electrolytes:   1. WOLFGANG with uremia but stable BUN and CR despite repeated shock episodes and contrast.  Oliguric. .  More acidotic --getting 2 amps bicarb daily    D10 at 20 mlhr seconday to low glucoses .  K ok   P: trial of lasix x 1     Infectious Disease:   1. No acute issues    Endocrine:       Hematology/Oncology:   Anemia seconday to bleeding  Platelets ok.    INR only slightly elevated         IV/Access:   1. Venous access - central line    ICU Prophylaxis:   1. DVT: mech  2. Feeding - npo  3.  Disposition -ICU            Key Medications:       chlorhexidine  15 mL Mouth/Throat Q12H     heparin lock flush  5-10 mL Intracatheter Q24H     insulin aspart  1-7 Units Subcutaneous TID AC     insulin aspart  1-5 Units Subcutaneous At Bedtime     sodium bicarbonate  50 mEq Intravenous BID       dextrose 20 mL/hr at 12/26/19 0800     midazolam 4 mg/hr (12/26/19 0800)     norepinephrine 0.2 mcg/kg/min (12/26/19 0800)     pantoprazole (PROTONIX) infusion ADULT/PEDS GREATER than or EQUAL to 45 kg 8 mg/hr (12/26/19 0800)     sodium chloride                 Physical Examination:   Weight up 2 kg since yesterday.  + 7 kg since admission     Wt Readings from Last 4 Encounters:   12/26/19 56.4 kg (124 lb 5.4 oz)   12/20/19 41.5 kg (91 lb 7.9 oz)     BP - Mean:  [] 81  Ventilation Mode: CMV/AC  (Continuous Mandatory Ventilation/ Assist Control)  FiO2 (%): 30 %  Rate Set (breaths/minute): 22 breaths/min  Tidal Volume Set (mL): 500 mL  PEEP (cm H2O): 5 cmH2O  Oxygen Concentration (%): 30 %  Resp: 21      GEN: does not opens eyes to voice.    HEENT: ETT ok   NECK: right IJ   PULM: clear anteriorly    CV/COR: RRR S1S2 no gallop,  No rub, no murmur  ABD: soft nontender, , no mass,  flexi-tube with melena/hematochezia   EXT:  3+  Edema,   Warm x4 -contractures and atrophy bilateral   SKIN: no obvious rash  LINES: clean, dry  intact         Data:   All data and imaging reviewed     ROUTINE ICU LABS (Last four results)  CMP  Recent Labs   Lab 12/26/19  0540 12/25/19  0555 12/24/19  1321 12/24/19  0615 12/23/19  0610 12/22/19  0405  12/20/19  0935 12/20/19  0555 12/20/19  0340    137 139 136 142 151*   < > 148* 149* 146*   POTASSIUM 4.6 4.7 4.6 4.4 4.5 4.7   < > 4.3 4.9 5.2   CHLORIDE 113* 112* 115* 112* 116* 125*   < > 121* 123* 121*   CO2 12* 12* 14* 15* 17* 17*   < > 16* 14* 11*   ANIONGAP 13 13 10 9 9 9   < > 11 12 14   * 83 112* 111* 91 108*   < > 172* 253* 216*   * 98* 99* 95* 105* 117*   < > 98* 104* 130*   CR 2.97* 2.72* 2.62* 2.63* 2.77* 2.95*   < > 2.17* 2.26* 2.49*   GFRESTIMATED 16* 18* 19* 19* 18* 17*   < > 24* 23* 20*   GFRESTBLACK 19* 21* 22* 22* 21* 19*   < > 28* 27* 24*   MORGAN 6.8* 6.3* 6.3* 6.5* 6.9* 7.2*   < > 8.6 8.1* 6.5*   MAG  --  1.7  --  1.4* 1.7 1.9   < > 1.4*  --   --    PHOS  --  4.8*  --  4.0 4.0 5.8*   < > 7.8*  --   --    PROTTOTAL  --   --  3.2*  --   --   --   --  4.5* 3.9* 4.3*   ALBUMIN  --   --  1.2*  --   --   --   --  2.3* 1.8* 1.8*   BILITOTAL  --   --  0.2  --   --   --   --  0.8 0.5 0.4   ALKPHOS  --   --  62  --   --   --   --  45 42 43   AST  --   --  12  --   --   --   --  39 28 22   ALT  --   --  12  --   --   --   --  23 22 17    < > = values in this interval not displayed.     CBC  Recent Labs   Lab 12/26/19  0000 12/25/19  1830 12/25/19  1008 12/25/19  0555 12/25/19  0200 12/24/19  2203   WBC  --   --  21.5* 20.5* 19.2* 17.7*   RBC  --   --  3.49* 3.91 4.17 4.42   HGB 8.4* 9.4* 10.7* 11.8 12.7 13.5   HCT  --   --  30.5* 34.1* 36.1 39.1   MCV  --   --  87 87 87 89   MCH  --   --  30.7 30.2 30.5 30.5   MCHC  --   --  35.1 34.6 35.2 34.5   RDW  --   --  15.1* 15.1* 15.0 15.1*   PLT  --   --  124* 117* 110* 98*     INR  Recent Labs   Lab 12/26/19  0540 12/25/19  1830 12/24/19  1321 12/21/19  0400   INR 1.36* 1.28* 1.26* 1.34*     Arterial Blood Gas  Recent Labs   Lab  12/24/19  1606 12/20/19  1225 12/20/19  0340   PH  --   --  7.21*   PCO2  --   --  24*   PO2  --   --  227*   HCO3  --   --  10*   O2PER 80% 40% FI02 VENT Ventilator       All cultures:  Recent Labs   Lab 12/20/19  0100 12/19/19  1908   CULT No growth No growth     No results found for this or any previous visit (from the past 24 hour(s)).    Labs personally reviewed/interpreted:  See a/p      35 minimal CC time for shock, hemorrhage, respiratory failure

## 2019-12-27 NOTE — PROGRESS NOTES
CLINICAL NUTRITION SERVICES  -  ASSESSMENT NOTE      Future/Additional Recommendations: If unable to advance diet in the next 1-2 days, recommend TPN    Malnutrition:   % Weight Loss:  Unable to determine without history   % Intake:  </= 50% for >/= 5 days (severe malnutrition)  Subcutaneous Fat Loss:  Orbital region moderate depletion  Muscle Loss:  Temporal region severe depletion and Clavicle bone region severe depletion, also likely muscle loss in lower extremities due to bed bound status (unable to examine due to compression socks)  Fluid Retention:  Severe 3+ in extremities     Malnutrition Diagnosis: Severe malnutrition  In Context of:  Acute illness or injury  Chronic illness or disease        REASON FOR ASSESSMENT  Eliza Thurman is a 59 year old female seen by Registered Dietitian for LOS      NUTRITION HISTORY  - Information obtained from [facility] as patient intubated and unable to provide.   - Patient admitted with GI hemorrhage and has been intubated twice.  Patient has MS at baseline and is bed bound.  SLP notes on 12/22 indicate that she was on a regular diet at home (no modifications) and is also a vegetarian.  She was on a diet for a short time (12/22-12/24) while on the medical floor and was noted to be a total feed with poor appetite and chronic pain due to MS.      CURRENT NUTRITION ORDERS  Diet Order:     NPO   Patient was on a DD2, thin liquid diet for a short time on the medical floor (12/22-12/24) but intake was noted to be poor (poor appetite) with intake at 25% of meals per flowsheets.   Therefore, patient is without adequate nutrition x 8 days     Current Intake/Tolerance:  N/A      NUTRITION FOCUSED PHYSICAL ASSESSMENT FOR DIAGNOSING MALNUTRITION)  Yes              Observed:    Muscle wasting (refer to documentation in Malnutrition section) and Subcutaneous fat loss (refer to documentation in Malnutrition section)    Obtained from Chart/Interdisciplinary Team:  Edema 3+ in  "extremities    ANTHROPOMETRICS  Height: No height available -- appears to be around 5'6\"  Weight: 49 kg (108#)(12/21)  Current Wt:  57.6 kg (12/27)   There is no height or weight on file to calculate BMI  Weight Status:  Unable without height on file   IBW: Unable to calculate without height   Weight History:   Wt Readings from Last 90 Encounters:   12/27/19 57.6 kg (126 lb 15.8 oz)   12/20/19 41.5 kg (91 lb 7.9 oz)   Weight up since admit due to fluids   No further weight history available       LABS   (H), Cr 2.97 (H) - WOLFGANG    MEDICATIONS  Lasix for fluid overload  Norepi drip for hypotension       ASSESSED NUTRITION NEEDS PER APPROVED PRACTICE GUIDELINES:    Dosing Weight 49 kg (12/21)  Estimated Energy Needs: 7119-0230 kcals (30-35 Kcal/Kg)  Justification: appears underweight, vented   Estimated Protein Needs: 60-75 grams protein (1.2-1.5 g pro/Kg)  Justification: repletion, hypercatabolism with acute illness and high BUN (> 100)  Estimated Fluid Needs: 0338-8921 mL (1 mL/Kcal)  Justification: maintenance    MALNUTRITION:  % Weight Loss:  Unable to determine without history   % Intake:  </= 50% for >/= 5 days (severe malnutrition)  Subcutaneous Fat Loss:  Orbital region moderate depletion  Muscle Loss:  Temporal region severe depletion and Clavicle bone region severe depletion, also likely muscle loss in lower extremities due to bed bound status (unable to examine due to compression socks)  Fluid Retention:  Severe 3+ in extremities     Malnutrition Diagnosis: Severe malnutrition  In Context of:  Acute illness or injury  Chronic illness or disease    NUTRITION DIAGNOSIS:  Inadequate protein-energy intake related to NPO with recurrent GIB as evidenced by meeting 0% needs, day #8 limited nutrition       NUTRITION INTERVENTIONS  Recommendations / Nutrition Prescription  If unable to advance diet in the next 1-2 days, recommend TPN     Implementation  Nutrition education: Not appropriate at this time due to " patient condition.  Collaboration and Referral of Nutrition care:  Patient discussed today during interdisciplinary bedside rounds.  Also discussed TPN with Dr. Perales who acknowledged patient will need soon.    Nutrition Goals  Patient will receive nutrition within the next 48 hours     MONITORING AND EVALUATION:  Progress towards goals will be monitored and evaluated per protocol and Practice Guidelines    Lucy Garcia RD, LD, CNSC   Clinical Dietitian - [facility]

## 2019-12-27 NOTE — PLAN OF CARE
CNS: RAAS -2 to -3 today, CPOT 2-4. Versed weaned from 4mg/hr to 2 mg/h. Afebrile. Significant pain with turns, PRN dilaudid given X 5 prior to turning for comfort. [place] speaking. Cannot fully open eyes, but can half-open them to voice, and full eye opening with discomfort. Inconsistent with obeying--weakly wiggled toes/grasped hands intermittently.   CVS: ST in 90's to low 100's. Norepinephrine at 0.03 mcg/kg/h, weaned from 0.2 today. MAP goal >65 mmHg. Pulses 1-2, palpable. Mod-sev periph edema.   RESP: AC/CMV RR 12 bpm, , PEEP 5, FiO2 30%. Suctioned q4h for creamy thick secretions. Clear lung sounds.   GI: about 150 mL maroon bloody stool. Rectal tube in situ. Abdomen slightly firm, hypoactive bowel sounds. Per Dr Perales, continue to hold TF/TPN.  : hagan; clear light yellow output  mL/hr.   IV: right internal jugular CVC infusing pantoprazole, norepi; PIV right hand infusing versed.   SKIN: right and left groin incisions from IR CDI. Perineal redness along leg crease.   MOB: bedbound and unable to ambulate at baseline. Significant contractures.  LABS: Hb 7.3 this evening, holding steady. BG 90's to low 100's. Calcium and bicarb given today.   FAM: son/daughter in to visit, spoke with Dr Perales with telephone translation.

## 2019-12-27 NOTE — PROGRESS NOTES
"GASTROENTEROLOGY PROGRESS NOTE    CC:Massive bleed secondary to gastro doudenal artery  IR with coil of GDA  EGD unable to treat the large clot and bleeding in the duodenum  Repeat IR intervention    SUBJECTIVE: Maroon stool    OBJECTIVE:  General Appearance: Intubated and sedated  BP 94/54   Pulse 101   Temp 97.6  F (36.4  C) (Axillary)   Resp 16   Ht 1.727 m (5' 8\")   Wt 57.6 kg (126 lb 15.8 oz)   SpO2 97%   BMI 19.31 kg/m    Temp (24hrs), Av.9  F (36.6  C), Min:97  F (36.1  C), Max:98.9  F (37.2  C)    Patient Vitals for the past 72 hrs:   Weight   19 0400 57.6 kg (126 lb 15.8 oz)   19 0200 56.4 kg (124 lb 5.4 oz)   19 0400 54.9 kg (121 lb 0.5 oz)       Intake/Output Summary (Last 24 hours) at 2019 1510  Last data filed at 2019 1500  Gross per 24 hour   Intake 1183.16 ml   Output 1950 ml   Net -766.84 ml       PHYSICAL EXAM  Abdomen: negative        Additional Comments:  ROS, FH, SH: See initial GI consult for details.    I have reviewed the patient's new clinical lab results:    Recent Labs   Lab Test 19  1100 19  0515 19  2330  19  0540  19  1830 19  1008 19  0555  19  1321   WBC  --  16.5*  --   --   --   --   --  21.5* 20.5*   < > 11.3*   HGB 7.3* 7.5* 7.5*   < >  --    < > 9.4* 10.7* 11.8   < > 7.2*   MCV  --  87  --   --   --   --   --  87 87   < > 87   PLT  --  171  --   --   --   --   --  124* 117*   < > 144*   INR  --   --   --   --  1.36*  --  1.28*  --   --   --  1.26*    < > = values in this interval not displayed.     Recent Labs   Lab Test 19  1100 19  0540 19  0555   POTASSIUM 4.0 4.6 4.7   CHLORIDE 117* 113* 112*   CO2 16* 12* 12*   * 101* 98*   ANIONGAP 11 13 13     Recent Labs   Lab Test 19  1321 19  0935 19  0555  19  1909   ALBUMIN 1.2* 2.3* 1.8*   < > 2.0*   BILITOTAL 0.2 0.8 0.5   < > 0.2   ALT 12 23 22   < > 24   AST 12 39 28   < > 33   LIPASE  --   --   " --   --  209    < > = values in this interval not displayed.     EGD 12/24         Impression:               - Normal esophagus.                             - Red blood in the gastric body. Fluid aspiration                             performed.                             - Blood in the duodenal bulb and bleeding from the                             clot site. Unable to treat a bleed of the                             Gastrodudodenal artery with endoscopic means and no                             surgical back up.                             - Multiple non-obstructing non-bleeding duodenal                             ulcers with no stigmata of bleeding in the second                             portion of the duodenum.         IR procedure 12/25       Angiogram did not show any definite active extravasation. Some leakage of contrast from an SMA branch in 2nd portion of duodenum like secondary to guidewire perforation embolized with mary liquid embolic agent. A right gastric branch supplying proximal duodenum embolized with mary.               Active Problems:    Hemorrhagic shock secondary to gastodoudenal atery bleed and rebleed.  More stable after second IR procedure or could be time. Hemoglobi unchanged  No role for endoscopy at this time    Will chart check over the weekend.       Odilia Chisholm MD  [facility]  Office

## 2019-12-27 NOTE — PROGRESS NOTES
[facility]  General Surgery Progress Note        Brandon Hough MD, MD   12/27/2019        Interval History:      Patient continuing to have dark stools, volume has decreased.  Although her hemoglobin has drifted down, it has been slow since her most recent intervention by interventional radiology.  Pressor requirements are minimal.         Assessment and Plan:      59-year-old female with complicated medical history and recurrent GI bleed.  Bleeding appears to be significantly slower since most recent interventional radiology procedure.  Do not see any convincing evidence to warrant a surgical intervention in this patient at this time.  Case was discussed with my partner who will be taking over.  If she begins to show signs of increased bleeding, we will need to reassess.                   Physical Exam:      Blood pressure 116/69, pulse 98, temperature 97.4  F (36.3  C), temperature source Axillary, resp. rate 14, weight 57.6 kg (126 lb 15.8 oz), SpO2 98 %.  Vitals:    12/25/19 0400 12/26/19 0200 12/27/19 0400   Weight: 54.9 kg (121 lb 0.5 oz) 56.4 kg (124 lb 5.4 oz) 57.6 kg (126 lb 15.8 oz)     Vital Signs with Ranges  Temp:  [97  F (36.1  C)-98.9  F (37.2  C)] 97.4  F (36.3  C)  Pulse:  [] 98  Heart Rate:  [] 98  Resp:  [14-29] 14  BP: ()/(46-76) 116/69  FiO2 (%):  [30 %] 30 %  SpO2:  [92 %-100 %] 98 %  I/O's Last 24 hours  I/O last 3 completed shifts:  In: 1046.4 [I.V.:1046.4]  Out: 1355 [Urine:1230; Stool:125]    Constitutional:  Patient intubated, sedated   Lungs:  On full ventilatory support   Cardiovascular:  Blood pressure, heart rate improved   Abdomen:  Nondistended   Skin:  Warm, dry   Imaging:           Medications:          chlorhexidine  15 mL Mouth/Throat Q12H     heparin lock flush  5-10 mL Intracatheter Q24H     insulin aspart  1-7 Units Subcutaneous TID AC     insulin aspart  1-5 Units Subcutaneous At Bedtime            Data:      All new lab and imaging  data was reviewed.   Recent Labs   Lab Test 12/27/19  1100 12/27/19  0515 12/26/19  2330  12/26/19  0540  12/25/19  1830 12/25/19  1008 12/25/19  0555  12/24/19  1321   WBC  --  16.5*  --   --   --   --   --  21.5* 20.5*   < > 11.3*   HGB 7.3* 7.5* 7.5*   < >  --    < > 9.4* 10.7* 11.8   < > 7.2*   MCV  --  87  --   --   --   --   --  87 87   < > 87   PLT  --  171  --   --   --   --   --  124* 117*   < > 144*   INR  --   --   --   --  1.36*  --  1.28*  --   --   --  1.26*    < > = values in this interval not displayed.

## 2019-12-27 NOTE — PROVIDER NOTIFICATION
Notified Dr Sandoval regarding patient's Hgb level of 7.5, to recheck again in am to see if still trending down.

## 2019-12-27 NOTE — PROGRESS NOTES
Critical Care Progress Note      12/27/2019    Name: Eliza Thurman MRN#: [id number]   Age: 59 year old YOB: 1960                    Problem List:   Active Problems:    Hemorrhagic shock (H)  upper GI bleeding -- recurrent   WOLFGANG  Intubated          Summary/Hospital Course:     59F [place] speaking with pmh of MS and gastric ulcer disease now presented to OSH with melena and emesis on 12/20.  She was very unstable prior to transfer and apparently cardiac arrested just prior to transport. Got  MTP   ~ 10 units PRBC.  On arrival here she was taken immediately to IR where embolization was performed and hemostasis was apparently achieved.    Was on lazar and doing ok but then had recurrent hypotension and melena on 12/24 so back to ICU and got 4 units PRBC,  DDAVP, PPI drip and needing intubation prior to EGD for acidosis and hypotension and scope showed oozing clot in duodenum in area of expected GDA ulcer.  No intervention done.      Overnight, still with melena/hematochezia from rectal tube but slightly less. Remains on ventilator.   Hgb slow drift 1-2 gm down but no transfusions in 48 hours       Assessment and plan :     Eliza Thurman is a 59 year old female admitted on 12/20/2019 for upper GI bleeding.     Neurology/Psychiatry:   1. H/o advanced MS--non ambulatory   3. Sedation:  Versed at 4 mg  RASS goal  0-1  But now -3.   4. S/p cardiac arrest--brief seconday to hemorrhagic shock. 5 days ago   P: sedation vacation     Cardiovascular:   1  Hemorrhagic shock:  on lo-dose norepinephrine but seems well perfused and last lactate normal.      Pulmonary/Ventilator Management:   1. Intubated 12/24 for shock.   Mild acidosis,  Needs to stay on ventilator until no more procedures are imminent.  Increasing effusions seconday to volume overload   P: decrease RR to 16     GI and Nutrition: Recurrent GDA ulcer bleeding--s/p 4 units PRBC Tuesday with peak Hgb 13 now 7.5 .  Still with hematochezia.    P:  continue serial Hgb, PPI, .  Only intervention left if still bleeding is surgery and Dr Hough has been following.   Renal/Fluids/Electrolytes:     1. WOLFGANG with uremia but stable BUN and CR despite repeated shock episodes and contrast.  Oliguric. .  More acidotic --got 2 amps bicarb daily    D10 at 20 mlhr seconday to low glucoses .  K ok   P: lasix 20 mg iv once   Decrease to 1 amp bicarb and 1 gm Ca Cl     Infectious Disease:   1. No acute issues    Endocrine:       Hematology/Oncology:   Anemia seconday to bleeding  Platelets ok.    INR only slightly elevated         IV/Access:   1. Venous access - central line    ICU Prophylaxis:   1. DVT: mech  2. Feeding - npo  3.  Disposition -ICU     Updated son via video interpretor today            Key Medications:       chlorhexidine  15 mL Mouth/Throat Q12H     furosemide  20 mg Intravenous Once     heparin lock flush  5-10 mL Intracatheter Q24H     insulin aspart  1-7 Units Subcutaneous TID AC     insulin aspart  1-5 Units Subcutaneous At Bedtime       dextrose 20 mL/hr at 12/27/19 0732     midazolam 4 mg/hr (12/27/19 0732)     norepinephrine 0.05 mcg/kg/min (12/27/19 1102)     pantoprazole (PROTONIX) infusion ADULT/PEDS GREATER than or EQUAL to 45 kg 8 mg/hr (12/27/19 0733)               Physical Examination:   Weight up 2 kg since yesterday.  + 7 kg since admission     Wt Readings from Last 4 Encounters:   12/27/19 57.6 kg (126 lb 15.8 oz)   12/20/19 41.5 kg (91 lb 7.9 oz)     BP - Mean:  [] 66  Ventilation Mode: CMV/AC  (Continuous Mandatory Ventilation/ Assist Control)  FiO2 (%): 30 %  Rate Set (breaths/minute): 18 breaths/min  Tidal Volume Set (mL): 500 mL  PEEP (cm H2O): 5 cmH2O  Oxygen Concentration (%): 30 %  Resp: 18      GEN: slightly  opens eyes to voice.    HEENT: ETT ok   NECK: right IJ   PULM: clear anteriorly    CV/COR: RRR S1S2 no gallop,  No rub, no murmur  ABD: soft nontender, , no mass,  flexi-tube withhematochezia   EXT:  3+  Edema,   Warm x4  -contractures and atrophy bilateral   SKIN: no obvious rash  LINES: clean, dry intact         Data:   All data and imaging reviewed     ROUTINE ICU LABS (Last four results)  CMP  Recent Labs   Lab 12/26/19  0540 12/25/19  0555 12/24/19  1321 12/24/19  0615 12/23/19  0610 12/22/19  0405    137 139 136 142 151*   POTASSIUM 4.6 4.7 4.6 4.4 4.5 4.7   CHLORIDE 113* 112* 115* 112* 116* 125*   CO2 12* 12* 14* 15* 17* 17*   ANIONGAP 13 13 10 9 9 9   * 83 112* 111* 91 108*   * 98* 99* 95* 105* 117*   CR 2.97* 2.72* 2.62* 2.63* 2.77* 2.95*   GFRESTIMATED 16* 18* 19* 19* 18* 17*   GFRESTBLACK 19* 21* 22* 22* 21* 19*   MORGAN 6.8* 6.3* 6.3* 6.5* 6.9* 7.2*   MAG  --  1.7  --  1.4* 1.7 1.9   PHOS  --  4.8*  --  4.0 4.0 5.8*   PROTTOTAL  --   --  3.2*  --   --   --    ALBUMIN  --   --  1.2*  --   --   --    BILITOTAL  --   --  0.2  --   --   --    ALKPHOS  --   --  62  --   --   --    AST  --   --  12  --   --   --    ALT  --   --  12  --   --   --      CBC  Recent Labs   Lab 12/27/19  0515 12/26/19  2330 12/26/19  1554 12/26/19  0825  12/25/19  1008 12/25/19  0555 12/25/19  0200   WBC 16.5*  --   --   --   --  21.5* 20.5* 19.2*   RBC 2.46*  --   --   --   --  3.49* 3.91 4.17   HGB 7.5* 7.5* 8.1* 8.0*   < > 10.7* 11.8 12.7   HCT 21.5*  --   --   --   --  30.5* 34.1* 36.1   MCV 87  --   --   --   --  87 87 87   MCH 30.5  --   --   --   --  30.7 30.2 30.5   MCHC 34.9  --   --   --   --  35.1 34.6 35.2   RDW 15.5*  --   --   --   --  15.1* 15.1* 15.0     --   --   --   --  124* 117* 110*    < > = values in this interval not displayed.     INR  Recent Labs   Lab 12/26/19  0540 12/25/19  1830 12/24/19  1321 12/21/19  0400   INR 1.36* 1.28* 1.26* 1.34*     Arterial Blood Gas  Recent Labs   Lab 12/26/19  1000 12/24/19  1606 12/20/19  1225   O2PER 30% 80% 40% FI02 VENT       All cultures:  No results for input(s): CULT in the last 168 hours.  No results found for this or any previous visit (from the past 24  hour(s)).    Labs personally reviewed/interpreted:  See a/p      35 minutes CC time for shock, hemorrhage, respiratory failure

## 2019-12-27 NOTE — PROGRESS NOTES
[facility] ICU RESPIRATORY NOTE        Date of Admission: 12/20/2019    Date of Intubation (most recent): 12/24/19     Reason for Mechanical Ventilation: Airway protection    Number of Days on Mechanical Ventilation: 4    Met Criteria for Spontaneous Breathing Trial: No    Reason for No Spontaneous Breathing Trial: Per MD    Significant Events Today:     ABG Results:   Recent Labs   Lab 12/26/19  1000 12/24/19  1606   O2PER 30% 80%       Current Vent Settings: Ventilation Mode: CMV/AC  (Continuous Mandatory Ventilation/ Assist Control)  FiO2 (%): 30 %  Rate Set (breaths/minute): 16 breaths/min  Tidal Volume Set (mL): 500 mL  PEEP (cm H2O): 5 cmH2O  Oxygen Concentration (%): 30 %  Resp: 15      Plan: Continue ventilatory support.    Beckie Anguiano, RT

## 2019-12-27 NOTE — PLAN OF CARE
Patient continues to be on full vent support with versed for sedation. Levo gtt for MAP goal >65. Tele SR to [facility] with adequate UO. PRN dilaudid given x1 for pain. Flexiseal w/ dark maroon output. Will continue to monitor.

## 2019-12-27 NOTE — CONSULTS
Please see SW note from 12/23/2019  4:18 PM.  Not appropriate for TCU referrals; pt currently ICU status.  Please add Care Transition RN / SW IP Consult when pt more stable for discharge planning again.

## 2019-12-28 NOTE — PROGRESS NOTES
Date of Admission: 12/20/2019     Date of Intubation (most recent): 12/24/19      Reason for Mechanical Ventilation: Airway protection     Number of Days on Mechanical Ventilation: 5     Met Criteria for Spontaneous Breathing Trial: No     Reason for No Spontaneous Breathing Trial: Per MD     Significant Events Today: none  Recent Labs   Lab 12/26/19  1000 12/24/19  1606   O2PER 30% 80%     Ventilation Mode: CMV/AC  (Continuous Mandatory Ventilation/ Assist Control)  FiO2 (%): 30 %  Rate Set (breaths/minute): 16 breaths/min  Tidal Volume Set (mL): 500 mL  PEEP (cm H2O): 5 cmH2O  Oxygen Concentration (%): 30 %  Resp: 15    Plan; cont full ventilatory support, SBT's daily

## 2019-12-28 NOTE — PROGRESS NOTES
Surgery    No signs of bleeding. Hgb stable without transfusion last 4 days.    A/P  Continue ICU care. Acute care will follow    Minh Staples M.D.  [facility]  [phone]  ]

## 2019-12-28 NOTE — PROGRESS NOTES
"[facility]  Gastroenterology Progress note      Assessment        Massive UGI bleed from GDASIA DU.  Status post IR coiling X2.  Stable overnight.  Hemoglobin 7.3 unchanged.  No transfusion since .  Other prob of renal insuff stable with creatinine 2.5-3 range. On protonix drip and low dose levophed      Plan     continue same mgmt.  No plans for repeat egd.       Interval History     intubated in ICU      Physical Exam    BP 94/61   Pulse 98   Temp 97.5  F (36.4  C) (Axillary)   Resp 16   Ht 1.727 m (5' 7.99\")   Wt 54.5 kg (120 lb 2.4 oz)   SpO2 99%   BMI 18.27 kg/m    Temp (24hrs), Av.5  F (36.4  C), Min:97  F (36.1  C), Max:97.9  F (36.6  C)    Patient Vitals for the past 72 hrs:   Weight   19 0000 54.5 kg (120 lb 2.4 oz)   19 0400 57.6 kg (126 lb 15.8 oz)   19 0200 56.4 kg (124 lb 5.4 oz)       Intake/Output Summary (Last 24 hours) at 2019 0852  Last data filed at 2019 0800  Gross per 24 hour   Intake 883.91 ml   Output 1935 ml   Net -1051.09 ml         Constitutional: NAD, comfortable  Cardiovascular: RRR, normal S1, S2   Respiratory: CTAB  Abdomen: soft, non-tender, nondistended,   Cor nl s1s2  Chest:  Anterior breath sounds clear  sedated      Additional Comments     ROS, FH, SH: See initial GI consult for details.    Lab Data     Recent Labs   Lab Test 19  0415 19  1600 19  1100 19  0515  19  0540  19  1830 19  1008  19  1321   WBC 14.9*  --   --  16.5*  --   --   --   --  21.5*   < > 11.3*   HGB 7.3* 7.3* 7.3* 7.5*   < >  --    < > 9.4* 10.7*   < > 7.2*   MCV 89  --   --  87  --   --   --   --  87   < > 87     --   --  171  --   --   --   --  124*   < > 144*   INR  --   --   --   --   --  1.36*  --  1.28*  --   --  1.26*    < > = values in this interval not displayed.     Recent Labs   Lab Test 19  0415 19  1100 19  0540    144 138   POTASSIUM 3.6 4.0 4.6 "   CHLORIDE 117* 117* 113*   CO2 17* 16* 12*   * 105* 101*   CR 3.29* 3.26* 2.97*   ANIONGAP 10 11 13   MORGAN 6.9* 6.8* 6.8*     Recent Labs   Lab Test 12/24/19  1321 12/20/19  0935 12/20/19  0555  12/19/19  1909   ALBUMIN 1.2* 2.3* 1.8*   < > 2.0*   BILITOTAL 0.2 0.8 0.5   < > 0.2   ALT 12 23 22   < > 24   AST 12 39 28   < > 33   ALKPHOS 62 45 42   < > 81   LIPASE  --   --   --   --  209    < > = values in this interval not displayed.               EMY Boudreaux MD  [facility]  Office: [phone] call if needed after 5PM or on weekends  Cell: [phone], not available after 5PM at this number

## 2019-12-28 NOTE — PROGRESS NOTES
Critical Care Progress Note      12/28/2019    Name: Eliza Thurman MRN#: [id number]   Age: 59 year old YOB: 1960                    Problem List:   Active Problems:    Hemorrhagic shock (H)  upper GI bleeding, Duodenal ulcer -- recurrent   WOLFGANG  Intubated          Summary/Hospital Course:     59F [place] speaking with pmh of MS and gastric ulcer disease now presented to OSH with melena and emesis on 12/20.  She was very unstable prior to transfer and apparently cardiac arrested just prior to transport. Got  MTP   ~ 10 units PRBC.  On arrival here she was taken immediately to IR where embolization was performed and hemostasis was apparently achieved.    Was on lazar and doing ok but then had recurrent hypotension and melena on 12/24 so back to ICU and got 4 units PRBC,  DDAVP, PPI drip and needing intubation prior to EGD for acidosis and hypotension and scope showed oozing clot in duodenum in area of expected GDA ulcer.  No intervention done.      Overnight, less hematochezia from rectal tube and Hgb stable x 36 hours.  No transfusions in 3 days.  hours       Assessment and plan :     Eliza Thurman is a 59 year old female admitted on 12/20/2019 for upper GI bleeding.     Neurology/Psychiatry:   1. H/o advanced MS--non ambulatory   3. Sedation:  Versed at 3 mg  RASS goal  0-1  But now -3.   4. S/p cardiac arrest--brief seconday to hemorrhagic shock. 7 days ago   P: sedation vacation     Cardiovascular:   1  Hemorrhagic shock:  on lo-dose norepinephrine but seems well perfused and last lactate normal.      Pulmonary/Ventilator Management:   1. Intubated 12/24 for shock.   Mild acidosis, Apneic today on PS trial. 30% O2    P: decrease RR to 12     GI and Nutrition: Recurrent GDA ulcer bleeding--s/p 4 units PRBC Tuesday with peak Hgb 13 now 7.3 but stable .    P: continue serial Hgb, PPI, .  Only intervention left if still bleeding is surgery and Dr Hough has been following.   NO nutrition for 8 days  so will start gastric tube feeds --nepro.     Renal/Fluids/Electrolytes:     1. WOLFGANG with uremia but stable BUN and CR despite repeated shock episodes and contrast.  Responds to lasix and bicarb better.     D10 at 20 mlhr seconday to low glucoses .  K ok   P: lasix 20 mg iv once today   Stop bicarb     Infectious Disease:   1. No acute issues    Endocrine:       Hematology/Oncology:   Anemia seconday to bleeding  Platelets ok.    INR only slightly elevated         IV/Access:   1. Venous access - central line    ICU Prophylaxis:   1. DVT: mech  2. Feeding - start nepro   3.  Disposition -ICU              Key Medications:       chlorhexidine  15 mL Mouth/Throat Q12H     furosemide  20 mg Intravenous Once     heparin lock flush  5-10 mL Intracatheter Q24H     insulin aspart  1-7 Units Subcutaneous TID AC     insulin aspart  1-5 Units Subcutaneous At Bedtime       dextrose 20 mL/hr at 12/28/19 0400     midazolam       norepinephrine 0.08 mcg/kg/min (12/28/19 0615)     pantoprazole (PROTONIX) infusion ADULT/PEDS GREATER than or EQUAL to 45 kg 8 mg/hr (12/28/19 0600)               Physical Examination:   Weight down 2 kg since yesterday.  +    Wt Readings from Last 4 Encounters:   12/28/19 54.5 kg (120 lb 2.4 oz)   12/20/19 41.5 kg (91 lb 7.9 oz)     BP - Mean:  [] 77  Ventilation Mode: CMV/AC  (Continuous Mandatory Ventilation/ Assist Control)  FiO2 (%): 30 %  Rate Set (breaths/minute): 16 breaths/min  Tidal Volume Set (mL): 500 mL  PEEP (cm H2O): 5 cmH2O  Oxygen Concentration (%): 30 %  Resp: 15      GEN: slightly  opens eyes to voice.    HEENT: ETT ok   NECK: right IJ   PULM: clear anteriorly    CV/COR: RRR S1S2 no gallop,  No rub, no murmur  ABD: soft nontender, , no mass,  flexi-tube with less hematochezia   EXT:  3+  Edema,   Warm x4 -contractures and atrophy bilateral   SKIN: no obvious rash  LINES: clean, dry intact         Data:   All data and imaging reviewed     ROUTINE ICU LABS (Last four  results)  CMP  Recent Labs   Lab 12/28/19  0415 12/27/19  1100 12/26/19  0540 12/25/19  0555 12/24/19  1321 12/24/19  0615 12/23/19  0610 12/22/19  0405    144 138 137 139 136 142 151*   POTASSIUM 3.6 4.0 4.6 4.7 4.6 4.4 4.5 4.7   CHLORIDE 117* 117* 113* 112* 115* 112* 116* 125*   CO2 17* 16* 12* 12* 14* 15* 17* 17*   ANIONGAP 10 11 13 13 10 9 9 9   GLC 90 114* 106* 83 112* 111* 91 108*   * 105* 101* 98* 99* 95* 105* 117*   CR 3.29* 3.26* 2.97* 2.72* 2.62* 2.63* 2.77* 2.95*   GFRESTIMATED 15* 15* 16* 18* 19* 19* 18* 17*   GFRESTBLACK 17* 17* 19* 21* 22* 22* 21* 19*   MORGAN 6.9* 6.8* 6.8* 6.3* 6.3* 6.5* 6.9* 7.2*   MAG  --   --   --  1.7  --  1.4* 1.7 1.9   PHOS  --   --   --  4.8*  --  4.0 4.0 5.8*   PROTTOTAL  --   --   --   --  3.2*  --   --   --    ALBUMIN  --   --   --   --  1.2*  --   --   --    BILITOTAL  --   --   --   --  0.2  --   --   --    ALKPHOS  --   --   --   --  62  --   --   --    AST  --   --   --   --  12  --   --   --    ALT  --   --   --   --  12  --   --   --      CBC  Recent Labs   Lab 12/28/19 0415 12/27/19  1600 12/27/19  1100 12/27/19  0515  12/25/19  1008 12/25/19  0555   WBC 14.9*  --   --  16.5*  --  21.5* 20.5*   RBC 2.37*  --   --  2.46*  --  3.49* 3.91   HGB 7.3* 7.3* 7.3* 7.5*   < > 10.7* 11.8   HCT 21.1*  --  20.8* 21.5*  --  30.5* 34.1*   MCV 89  --   --  87  --  87 87   MCH 30.8  --   --  30.5  --  30.7 30.2   MCHC 34.6  --   --  34.9  --  35.1 34.6   RDW 15.6*  --   --  15.5*  --  15.1* 15.1*     --   --  171  --  124* 117*    < > = values in this interval not displayed.     INR  Recent Labs   Lab 12/26/19  0540 12/25/19  1830 12/24/19  1321   INR 1.36* 1.28* 1.26*     Arterial Blood Gas  Recent Labs   Lab 12/26/19  1000 12/24/19  1606   O2PER 30% 80%       All cultures:  No results for input(s): CULT in the last 168 hours.  No results found for this or any previous visit (from the past 24 hour(s)).    Labs personally reviewed/interpreted:  See a/p      35  minutes CC time for shock, hemorrhage, respiratory failure

## 2019-12-28 NOTE — PLAN OF CARE
Patient not putting much out from rectal tube; Hemoglobin unchanged from the day before. Blood pressure still treated with vasopressor, unable to be completely weaned off. Patient significantly spastic, frowns with each turn despite being premedicated with pain medication.

## 2019-12-29 NOTE — PLAN OF CARE
Patient remains on vent with versed for sedation and prn dilaudid for pain.  Versed cut in half today.  Apnea with pressure support trial this morning.  Does open eyes to touch/loud voice and intermittently follows commands.  Significant grimacing with any movement.  NG placed for trickle feed as there has not been GI bleeding since yesterday.  Rectal tube removed this evening.  No stool today yet.  Hemoglobin stable.  Remains on protonix gtt.  Levo off from [id number] today but needing to be restarted this evening to keep MAPs >65.  Son updated over phone this afternoon.

## 2019-12-29 NOTE — PLAN OF CARE
OT: pt intubated and sedated not appropriate for therapy today, pt has contractures and appropriate for tomorrow.

## 2019-12-29 NOTE — PROGRESS NOTES
[facility] ICU RESPIRATORY NOTE        Date of Admission: 12/20/2019    Date of Intubation (most recent): 12/24/19    Reason for Mechanical Ventilation: Airway protection    Number of Days on Mechanical Ventilation: 6    Met Criteria for Spontaneous Breathing Trial: No    Reason for No Spontaneous Breathing Trial: per MD    Significant Events Today: None    ABG Results:   Recent Labs   Lab 12/28/19  1101 12/26/19  1000 12/24/19  1606   O2PER 30 30% 80%       Current Vent Settings: Ventilation Mode: CMV/AC  (Continuous Mandatory Ventilation/ Assist Control)  FiO2 (%): 30 %  Rate Set (breaths/minute): 12 breaths/min  Tidal Volume Set (mL): 450 mL  PEEP (cm H2O): 5 cmH2O  Oxygen Concentration (%): 30 %  Resp: 14      Plan: Will continue full ventilatory support    Janae Singh RT

## 2019-12-29 NOTE — PROGRESS NOTES
[facility] ICU RESPIRATORY NOTE        Date of Admission: 12/20/2019    Date of Intubation (most recent):  12/24/19    Reason for Mechanical Ventilation:  Hemorrhagic shock    Number of Days on Mechanical Ventilation:  6    Met Criteria for Spontaneous Breathing Trial:   Yes 5/5 for 30 minutes in the morning and 1 hr 15 minutes in the afternoon    Significant Events Today: None  ABG Results:   Recent Labs   Lab 12/28/19  1101 12/26/19  1000 12/24/19  1606   O2PER 30 30% 80%       Current Vent Settings: Ventilation Mode: CMV/AC  (Continuous Mandatory Ventilation/ Assist Control)  FiO2 (%): 30 %  Rate Set (breaths/minute): 12 breaths/min  Tidal Volume Set (mL): 450 mL  PEEP (cm H2O): 5 cmH2O  Pressure Support (cm H2O): 5 cmH2O  Oxygen Concentration (%): 30 %  Resp: 11      Plan: Pt to remain on full vent support overnight    Valente Calderon, RT

## 2019-12-29 NOTE — PROGRESS NOTES
"[facility]  Gastroenterology Progress note      Assessment        stable overnight.  No change in hemoglobin      Plan    Per icu staff  Will continue to follow for now      Interval History    intubated      Physical Exam    /60   Pulse 112   Temp 99  F (37.2  C) (Axillary)   Resp 19   Ht 1.727 m (5' 7.99\")   Wt 51.2 kg (112 lb 14 oz)   SpO2 90%   BMI 17.17 kg/m    Temp (24hrs), Av.5  F (36.9  C), Min:98.1  F (36.7  C), Max:99.1  F (37.3  C)    Patient Vitals for the past 72 hrs:   Weight   19 0000 51.2 kg (112 lb 14 oz)   19 0000 54.5 kg (120 lb 2.4 oz)   19 0400 57.6 kg (126 lb 15.8 oz)       Intake/Output Summary (Last 24 hours) at 2019 1155  Last data filed at 2019 0900  Gross per 24 hour   Intake 1678.3 ml   Output 1160 ml   Net 518.3 ml         Constitutional: NAD, comfortable  Cardiovascular: RRR, normal S1, S2   Respiratory: CTAB  Abdomen: soft, non-tender, nondistended,  bs+      Additional Comments     ROS, FH, SH: See initial GI consult for details.    Lab Data     Recent Labs   Lab Test 19  0415 19  1603 19  0415  19  0515  19  0540  19  1830  19  1321   WBC 15.2*  --  14.9*  --  16.5*  --   --   --   --    < > 11.3*   HGB 7.4* 7.5* 7.3*   < > 7.5*   < >  --    < > 9.4*   < > 7.2*   MCV 88  --  89  --  87  --   --   --   --    < > 87     --  182  --  171  --   --   --   --    < > 144*   INR  --   --   --   --   --   --  1.36*  --  1.28*  --  1.26*    < > = values in this interval not displayed.     Recent Labs   Lab Test 19  0415 19  0415 19  1100    144 144   POTASSIUM 3.3* 3.6 4.0   CHLORIDE 117* 117* 117*   CO2 17* 17* 16*   * 100* 105*   CR 3.39* 3.29* 3.26*   ANIONGAP 10 10 11   MORGAN 6.6* 6.9* 6.8*     Recent Labs   Lab Test 19  1321 19  0935 19  0555  19  1909   ALBUMIN 1.2* 2.3* 1.8*   < > 2.0*   BILITOTAL 0.2 0.8 0.5   < > " 0.2   ALT 12 23 22   < > 24   AST 12 39 28   < > 33   ALKPHOS 62 45 42   < > 81   LIPASE  --   --   --   --  209    < > = values in this interval not displayed.               EMY Boudreaux MD  [facility]  Office: [phone] call if needed after 5PM or on weekends  Cell: [phone], not available after 5PM at this number

## 2019-12-29 NOTE — PROGRESS NOTES
Critical Care Progress Note      12/29/2019    Name: Eliza Thurman MRN#: [id number]   Age: 59 year old YOB: 1960                  Problem List:   Active Problems:    Hemorrhagic shock (H)  upper GI bleeding, Duodenal ulcer -- recurrent   WOLFGANG  Intubated          Summary/Hospital Course:     59F [place] speaking with pmh of MS and gastric ulcer disease now presented to OSH with melena and emesis on 12/20.  She was very unstable prior to transfer and apparently cardiac arrested just prior to transport. Got  MTP   ~ 10 units PRBC.  On arrival here she was taken immediately to IR where embolization was performed and hemostasis was apparently achieved.    Was on lazar and doing ok but then had recurrent hypotension and melena on 12/24 so back to ICU and got 4 units PRBC,  DDAVP, PPI drip and needing intubation prior to EGD for acidosis and hypotension and scope showed oozing clot in duodenum in area of expected GDA ulcer.  No intervention done.      Overnight, less hematochezia from rectal tube and Hgb stable x 36 hours.  No transfusions in 3 days.  hours       Assessment and plan :     Eliza Thurman is a 59 year old female admitted on 12/20/2019 for upper GI bleeding.     Neurology/Psychiatry:   1. H/o advanced MS--non ambulatory   3. Sedation:  Versed at 3 mg  RASS goal  0-1  But now -3.   4. S/p cardiac arrest--brief seconday to hemorrhagic shock. 8 days ago   P: daily sedation vacation     Cardiovascular:   1  Hemorrhagic shock:  on lo-dose norepinephrine but seems well perfused and lactate normal 12/26.  TTE 12/29    Pulmonary/Ventilator Management:   1. Intubated 12/24 for shock. Tolerating  PS trial first time today. 30% O2  CXR 12/24 small L sided effusion  P: PST twice today, if tolerates consider extubation either later today or in am, repeat CXR in am    GI and Nutrition: Recurrent GDA ulcer bleeding--s/p 4 units PRBC Tuesday with peak Hgb 13 now 7.4 but stable .    P: continue serial Hgb,  PPI gtt, .  Only intervention left if still bleeding is surgery and Dr Hough has been following.   NO nutrition for 8 days so will start gastric tube feeds --nepro.     Renal/Fluids/Electrolytes:   1. WOLFGANG with uremia but stable BUN and CR despite repeated shock episodes and contrast.  Responds to lasix and bicarb better.     D10 at 20 mlhr seconday to low glucoses .   P: IVF    Infectious Disease:   1. No acute issues    Hematology/Oncology:   Anemia seconday to bleeding  Platelets ok.    INR only slightly elevated       IV/Access:   1. Venous access - central line    ICU Prophylaxis:   1. DVT: mech  2. Feeding - start nepro   3.  Disposition -ICU          Key Medications:       chlorhexidine  15 mL Mouth/Throat Q12H     heparin lock flush  5-10 mL Intracatheter Q24H     insulin aspart  1-7 Units Subcutaneous TID AC     insulin aspart  1-5 Units Subcutaneous At Bedtime       dextrose 20 mL/hr at 12/29/19 0919     midazolam Stopped (12/29/19 0845)     norepinephrine 0.03 mcg/kg/min (12/29/19 1001)     pantoprazole (PROTONIX) infusion ADULT/PEDS GREATER than or EQUAL to 45 kg 8 mg/hr (12/29/19 0856)     sodium chloride 20 mL/hr at 12/29/19 0919               Physical Examination:   Weight down 2 kg since yesterday.  +    Wt Readings from Last 4 Encounters:   12/29/19 51.2 kg (112 lb 14 oz)   12/20/19 41.5 kg (91 lb 7.9 oz)     BP - Mean:  [56-94] 72  Ventilation Mode: CMV/AC  (Continuous Mandatory Ventilation/ Assist Control)  FiO2 (%): 30 %  Rate Set (breaths/minute): 12 breaths/min  Tidal Volume Set (mL): 450 mL  PEEP (cm H2O): 5 cmH2O  Pressure Support (cm H2O): 5 cmH2O  Oxygen Concentration (%): 30 %  Resp: 19    GEN: slightly  opens eyes to voice.    HEENT: ETT ok   NECK: right IJ   PULM: clear anteriorly    CV/COR: RRR S1S2 no gallop,  No rub, no murmur  ABD: soft nontender, , no mass,  flexi-tube with less hematochezia   EXT:  3+  Edema,   Warm x4 -contractures and atrophy bilateral   SKIN: no obvious  rash  LINES: clean, dry intact         Data:   All data and imaging reviewed     ROUTINE ICU LABS (Last four results)  CMP  Recent Labs   Lab 12/29/19  0415 12/28/19  0415 12/27/19  1100 12/26/19  0540 12/25/19  0555 12/24/19  1321 12/24/19  0615 12/23/19  0610    144 144 138 137 139 136 142   POTASSIUM 3.3* 3.6 4.0 4.6 4.7 4.6 4.4 4.5   CHLORIDE 117* 117* 117* 113* 112* 115* 112* 116*   CO2 17* 17* 16* 12* 12* 14* 15* 17*   ANIONGAP 10 10 11 13 13 10 9 9   * 90 114* 106* 83 112* 111* 91   * 100* 105* 101* 98* 99* 95* 105*   CR 3.39* 3.29* 3.26* 2.97* 2.72* 2.62* 2.63* 2.77*   GFRESTIMATED 14* 15* 15* 16* 18* 19* 19* 18*   GFRESTBLACK 16* 17* 17* 19* 21* 22* 22* 21*   MORGAN 6.6* 6.9* 6.8* 6.8* 6.3* 6.3* 6.5* 6.9*   MAG  --   --   --   --  1.7  --  1.4* 1.7   PHOS  --  6.0*  --   --  4.8*  --  4.0 4.0   PROTTOTAL  --   --   --   --   --  3.2*  --   --    ALBUMIN  --   --   --   --   --  1.2*  --   --    BILITOTAL  --   --   --   --   --  0.2  --   --    ALKPHOS  --   --   --   --   --  62  --   --    AST  --   --   --   --   --  12  --   --    ALT  --   --   --   --   --  12  --   --      CBC  Recent Labs   Lab 12/29/19  0415 12/28/19  1603 12/28/19  0415 12/27/19  1600 12/27/19  1100 12/27/19  0515  12/25/19  1008   WBC 15.2*  --  14.9*  --   --  16.5*  --  21.5*   RBC 2.41*  --  2.37*  --   --  2.46*  --  3.49*   HGB 7.4* 7.5* 7.3* 7.3* 7.3* 7.5*   < > 10.7*   HCT 21.3*  --  21.1*  --  20.8* 21.5*  --  30.5*   MCV 88  --  89  --   --  87  --  87   MCH 30.7  --  30.8  --   --  30.5  --  30.7   MCHC 34.7  --  34.6  --   --  34.9  --  35.1   RDW 15.4*  --  15.6*  --   --  15.5*  --  15.1*     --  182  --   --  171  --  124*    < > = values in this interval not displayed.     INR  Recent Labs   Lab 12/26/19  0540 12/25/19  1830 12/24/19  1321   INR 1.36* 1.28* 1.26*     Arterial Blood Gas  Recent Labs   Lab 12/28/19  1101 12/26/19  1000 12/24/19  1606   O2PER 30 30% 80%       All cultures:  No  results for input(s): CULT in the last 168 hours.  No results found for this or any previous visit (from the past 24 hour(s)).    Labs personally reviewed/interpreted:  See a/p      35 minutes CC time for shock, hemorrhage, respiratory failure     Marilee Edgar MD

## 2019-12-30 NOTE — PROGRESS NOTES
Critical Care Progress Note      12/30/2019    Name: Eliza Thurman MRN#: [id number]   Age: 59 year old YOB: 1960                  Problem List:   Active Problems:    Hemorrhagic shock (H)  upper GI bleeding, Duodenal ulcer -- recurrent   WOLFGANG  Intubated          Summary/Hospital Course:     59F [place] speaking with pmh of MS and gastric ulcer disease now presented to OSH with melena and emesis on 12/20.  She was very unstable prior to transfer and apparently cardiac arrested just prior to transport. Got  MTP   ~ 10 units PRBC.  On arrival here she was taken immediately to IR where embolization was performed and hemostasis was apparently achieved.    Was on lazar and doing ok but then had recurrent hypotension and melena on 12/24 so back to ICU and got 4 units PRBC,  DDAVP, PPI drip and needing intubation prior to EGD for acidosis and hypotension and scope showed oozing clot in duodenum in area of expected GDA ulcer.  No intervention done.         Interim History:      - weaned off pressors early AM  - HGN 6.8 (slow drift) transfused 1 unit(s) prbc (first in 36 + hrs)   - no further hematochezia from rectal tube   - PS trials starting, though tiring out towards end      Assessment and plan :     Eliza Thurman is a 59 year old female admitted on 12/20/2019 for upper GI bleeding.     Neurology/Psychiatry:   1. H/o advanced MS--non ambulatory/immobile at baseline   3. Sedation:  Off gtts   4. S/p cardiac arrest--brief seconday to hemorrhagic shock. 8 days ago   P: follow mental status, avoid sedating medications as possible     Cardiovascular:   1  Hemorrhagic shock: weaned off lo-dose norepinephrine 12/29 seems ,exam seems well perfused and lactate normal 12/26.  TTE 12/29 ef 65% mod AR,   -P follow hemodynamics, goal MAP >65     Pulmonary/Ventilator Management:   1. Acute respiratory failure 2/2 shock - Intubated 12/24 for shock. PS trials started 12.29 initially encouraging but tired out after  60 min -   P: continue vent support, optimize fluid status, PS as tolerated   - follow secretions , check sputum as if manageble may be able to extubate to bipap, however unclear at present whether option    GI and Nutrition:   1. Recurrent GDA ulcer bleeding with hemorrhagic shock and CODE   - required embolization by IR x 2 required MTP   - s/p 4 units PRBC+ 12/24  with peak Hgb 13 now 7.4 > 6.8  - 1 unit(s) PRBC ordered 12/30   P: continue serial Hgb,  - discontinue PPI gtt  > BID PPI   - if opens up only intervention left if still bleeding is surgery and Dr Hough has been following.   - gastric tube feeds --nepro started 12/29    Renal/Fluids/Electrolytes:   1. WOLFGANG with uremia;  BUN and CR stabilized UOP appropriate  - was receiving lasix for diuresis but since held 12/29   P: aim even fluid status  - electrolyte repletions  -     Infectious Disease:   1. No acute issues -but persistent leukocytosis  P ; check procal in AM , check sputum         Hematology/Oncology:   1. Acute on chronic anemia seconday to bleeding - stabilized   2. Persistent Leukocytosis   P: Serial CBC, follow coags, goal Hgb >7,     IV/Access:   1. Venous access - central line    ICU Prophylaxis:   1. DVT: mech  2. Feeding - start nepro   3.  Disposition -ICU          Key Medications:       chlorhexidine  15 mL Mouth/Throat Q12H     heparin lock flush  5-10 mL Intracatheter Q24H     insulin aspart  1-7 Units Subcutaneous TID AC     insulin aspart  1-5 Units Subcutaneous At Bedtime       dextrose 20 mL/hr at 12/30/19 0734     midazolam Stopped (12/29/19 0845)     norepinephrine Stopped (12/30/19 0319)     pantoprazole (PROTONIX) infusion ADULT/PEDS GREATER than or EQUAL to 45 kg 8 mg/hr (12/30/19 0734)     sodium chloride 75 mL/hr at 12/30/19 0735               Physical Examination:     Intake/Output Summary (Last 24 hours) at 12/30/2019 1209  Last data filed at 12/30/2019 1200  Gross per 24 hour   Intake 3702.22 ml   Output 775 ml   Net  2927.22 ml         Wt Readings from Last 4 Encounters:   12/30/19 54.5 kg (120 lb 2.4 oz)   12/20/19 41.5 kg (91 lb 7.9 oz)     BP - Mean:  [] 88  Ventilation Mode: CMV/AC  (Continuous Mandatory Ventilation/ Assist Control)  FiO2 (%): 30 %  Rate Set (breaths/minute): 12 breaths/min  Tidal Volume Set (mL): 450 mL  PEEP (cm H2O): 5 cmH2O  Pressure Support (cm H2O): 5 cmH2O  Oxygen Concentration (%): 30 %  Resp: 29    GEN: slightly  opens eyes to voice.    HEENT: ETT ok   NECK: right IJ c/di  PULM: clear anteriorly  syncrhonous with vent,   CV/COR: RRR S1S2 no gallop,  No rub, no murmur  ABD: soft nontender, , no mass,  flexi-tube with less hematochezia   EXT:  3+  Edema,   Warm x4 -contractures and atrophy bilateral   SKIN: no obvious rash  LINES: clean, dry intact         Data:   All data and imaging reviewed     ROUTINE ICU LABS (Last four results)  CMP  Recent Labs   Lab 12/30/19  0530 12/29/19  2300 12/29/19  1326 12/29/19  0415 12/28/19  0415  12/25/19  0555 12/24/19  1321 12/24/19  0615   *  --  146* 144 144   < > 137 139 136   POTASSIUM 3.9 3.8 3.2* 3.3* 3.6   < > 4.7 4.6 4.4   CHLORIDE 120*  --  120* 117* 117*   < > 112* 115* 112*   CO2 17*  --  18* 17* 17*   < > 12* 14* 15*   ANIONGAP 10  --  8 10 10   < > 13 10 9   *  --  122* 107* 90   < > 83 112* 111*   BUN 94*  --  100* 105* 100*   < > 98* 99* 95*   CR 3.36*  --  3.52* 3.39* 3.29*   < > 2.72* 2.62* 2.63*   GFRESTIMATED 14*  --  13* 14* 15*   < > 18* 19* 19*   GFRESTBLACK 16*  --  16* 16* 17*   < > 21* 22* 22*   MORGAN 6.8*  --  6.6* 6.6* 6.9*   < > 6.3* 6.3* 6.5*   MAG  --   --   --   --   --   --  1.7  --  1.4*   PHOS  --   --   --   --  6.0*  --  4.8*  --  4.0   PROTTOTAL  --   --   --   --   --   --   --  3.2*  --    ALBUMIN  --   --   --   --   --   --   --  1.2*  --    BILITOTAL  --   --   --   --   --   --   --  0.2  --    ALKPHOS  --   --   --   --   --   --   --  62  --    AST  --   --   --   --   --   --   --  12  --    ALT  --    --   --   --   --   --   --  12  --     < > = values in this interval not displayed.     CBC  Recent Labs   Lab 19  0530 19  2300 19  1326 19  0415   WBC 17.0* 17.7* 16.1* 15.2*   RBC 2.23* 2.42* 2.30* 2.41*   HGB 6.8* 7.3* 7.0* 7.4*   HCT 19.8* 21.5* 20.5* 21.3*   MCV 89 89 89 88   MCH 30.5 30.2 30.4 30.7   MCHC 34.3 34.0 34.1 34.7   RDW 15.5* 15.4* 15.5* 15.4*    227 201 219     INR  Recent Labs   Lab 19  0540 19  1830 19  1321   INR 1.36* 1.28* 1.26*     Arterial Blood Gas  Recent Labs   Lab 19  1120 19  1101 19  1000 19  1606   PH 7.34*  --   --   --    PCO2 24*  --   --   --    PO2 72*  --   --   --    HCO3 13*  --   --   --    O2PER PENDING 30 30% 80%       All cultures:  No results for input(s): CULT in the last 168 hours.  Recent Results (from the past 24 hour(s))   Echocardiogram Limited    Narrative    [id number]  [id number]  [id number]  [id number]^MAMI^HENRY^DONNA           [facility]  Echocardiography Laboratory  [address] [place] [id number]        Name: OLYA BEJARANO  MRN: [id number]  : 1960  Study Date: 2019 01:24 PM  Age: 59 yrs  Gender: Female  Patient Location: [facility]  Reason For Study: Other, Please Specify in Comments  Ordering Physician: HENRY BOLAÑOS  Performed By: Malvin Amato     BSA: 1.6 m2  Height: 68 in  Weight: 112 lb  HR: 105  BP: 101/60 mmHg  _____________________________________________________________________________  __        Procedure  Limited Portable Echo Adult.  _____________________________________________________________________________  __        Interpretation Summary     1. Left ventricular systolic function is normal. The visual ejection fraction  is estimated at 60-65%.  2. No regional wall motion abnormalities noted.  3. The right ventricle is normal in structure, function and size.  4. There is moderate (2+) aortic regurgitation.  5. Left pleural  effusion is present.  6. No prior studies available for comparison.  _____________________________________________________________________________  __        Left Ventricle  The left ventricle is normal in size. There is normal left ventricular wall  thickness. Left ventricular systolic function is normal. The visual ejection  fraction is estimated at 60-65%. Left ventricular diastolic function is  indeterminate. No regional wall motion abnormalities noted.     Right Ventricle  The right ventricle is normal in structure, function and size.     Atria  The left atrium is mildly dilated. Right atrial size is normal.     Mitral Valve  There is mild (1+) mitral regurgitation.        Tricuspid Valve  The tricuspid valve is normal in structure and function.     Aortic Valve  The aortic valve is trileaflet with aortic valve sclerosis. There is moderate  (2+) aortic regurgitation.     Pulmonic Valve  The pulmonic valve is not well seen, but is grossly normal.     Vessels  Normal size aorta. IVC diameter <2.1 cm collapsing >50% with sniff suggests a  normal RA pressure of 3 mmHg.     Pericardium  There is no pericardial effusion. Left pleural effusion is present.     _____________________________________________________________________________  __  MMode/2D Measurements & Calculations  IVSd: 0.78 cm  LVIDd: 4.0 cm  LVIDs: 2.2 cm  LVPWd: 0.76 cm  FS: 44.3 %     LV mass(C)d: 88.6 grams  LV mass(C)dI: 55.4 grams/m2  RWT: 0.38        Doppler Measurements & Calculations  AI P1/2t: 247.2 msec  TR max larisa: 285.1 cm/sec  TR max P.5 mmHg           _____________________________________________________________________________  __           Report approved by: Oswaldo Wise 2019 05:41 PM      XR Chest Port 1 View    Narrative    CHEST PORTABLE ONE VIEW 2019 6:15 AM     HISTORY: Respiratory failure.      Impression    IMPRESSION: Allowing for rotation, the chest is relatively stable in  appearance compared  12/24/2019. There is continued opacification at  the left lung base which may be related to a combination of small  pleural effusion and parenchymal opacity. The right lung appears  grossly clear. Endotracheal tube and right-sided central catheter  appear unchanged. There is a new nasogastric tube extending into the  stomach, the side port near the gastroesophageal junction.    ABBI PALOMINO MD         35 minutes CC time for hemorrhage, respiratory failure

## 2019-12-30 NOTE — PROVIDER NOTIFICATION
Notified Radha NP about pt's HR consistently 125 and above.  Will check another HGB and await new orders.

## 2019-12-30 NOTE — PROGRESS NOTES
CLINICAL NUTRITION SERVICES - REASSESSMENT NOTE      RECOMMENDATIONS FOR MD/PROVIDER TO ORDER: Recommend increase TF to goal of Nepro at 35 mL/hr to provide:  1512 kcal (31 kcal/kg), 68 g protein (1.4 g/kg), 135 g CHO, 11 g fiber, 613 mL H2O    Would also recommend add flushes 60 mL every 4 hours   Malnutrition: (12/27)  % Weight Loss:  Unable to determine without history   % Intake:  </= 50% for >/= 5 days (severe malnutrition)  Subcutaneous Fat Loss:  Orbital region moderate depletion  Muscle Loss:  Temporal region severe depletion and Clavicle bone region severe depletion, also likely muscle loss in lower extremities due to bed bound status (unable to examine due to compression socks)  Fluid Retention:  Severe 3+ in extremities      Malnutrition Diagnosis: Severe malnutrition  In Context of:  Acute illness or injury  Chronic illness or disease       EVALUATION OF PROGRESS TOWARD GOALS   Diet:  NPO on vent     Nutrition Support:  Patient was started on trickle TF 12/28 am by MD (10 mL/hr of Nepro), was increased to 20 mL/hr later that evening, and continues as follows ~    Nutrition Support Enteral:  Type of Feeding Tube: NG  Enteral Frequency:  Continuous  Enteral Regimen: Nepro at 20 mL/hr  Total Enteral Provisions: 864 kcal (18 kcal/kg), 39 g protein (0.8 g/kg), 77 g CHO, 350 mL H2O  Free Water Flush: Flushes not ordered      Intake/Tolerance:    Tolerating above TF without any documented issues  Above regimen meeting ~55% needs --> day #11 inadequate nutrition   Na 147 (H)  BUN 94 (H), Cr 3.36 (H) - WOLFGANG  K normal today, Mg normal on 12/25, Phos 6.0 (H) on 12/28 - WOLFGANG  BM x 2 on 12/28  BGM < 150 - Medium sliding scale insulin       ASSESSED NUTRITION NEEDS:  Dosing Weight 49 kg (12/21)  Estimated Energy Needs: 7524-7278 kcals (30-35 Kcal/Kg)  Justification: appears underweight, vented   Estimated Protein Needs: 60-75 grams protein (1.2-1.5 g pro/Kg)  Justification: repletion, hypercatabolism with acute illness  "and high BUN      NEW FINDINGS:   Noted height is 5'8\" (last note did not have a documented height) --> This puts patient with BM of 16.4 kg/m^2 (based on admit weight of 49 kg), at 77% IBW    Today's weight 54.5 kg (up due to fluids), I/O 3075/815      Previous Goals (12/27):   Patient will receive nutrition within the next 48 hours   Evaluation: Met, however still with inadequate nutrition (day #11)    Previous Nutrition Diagnosis (12/27):   Inadequate protein-energy intake related to NPO with recurrent GIB as evidenced by meeting 0% needs, day #8 limited nutrition   Evaluation: Improving      CURRENT NUTRITION DIAGNOSIS  Inadequate enteral nutrition infusion related to TF running at 20 mL/hr per MD orders as evidenced by meeting ~55% needs via current regimen     INTERVENTIONS  Recommendations / Nutrition Prescription  Recommend increase TF to goal of Nepro at 35 mL/hr to provide:  1512 kcal (31 kcal/kg), 68 g protein (1.4 g/kg), 135 g CHO, 11 g fiber, 613 mL H2O    Would also recommend add flushes 60 mL every 4 hours     Implementation  None     Goals  Patient will reach goal TF within the next 24 hours     MONITORING AND EVALUATION:  Progress towards goals will be monitored and evaluated per protocol and Practice Guidelines    Lucy Garcia RD, LD, CNSC   Clinical Dietitian - [facility]           "

## 2019-12-30 NOTE — PROGRESS NOTES
[facility] ICU RESPIRATORY NOTE        Date of Admission: 12/20/2019    Date of Intubation (most recent): 12/24/19    Reason for Mechanical Ventilation: Hemorrhagic shock    Number of Days on Mechanical Ventilation: 7    Met Criteria for Spontaneous Breathing Trial: No    Reason for No Spontaneous Breathing Trial: per MD    Significant Events Today: None    ABG Results:   Recent Labs   Lab 12/28/19  1101 12/26/19  1000 12/24/19  1606   O2PER 30 30% 80%       Current Vent Settings: Ventilation Mode: CMV/AC  (Continuous Mandatory Ventilation/ Assist Control)  FiO2 (%): 30 %  Rate Set (breaths/minute): 12 breaths/min  Tidal Volume Set (mL): 450 mL  PEEP (cm H2O): 5 cmH2O  Pressure Support (cm H2O): 5 cmH2O  Oxygen Concentration (%): 30 %  Resp: 12      Plan: Will continue full ventilatory support    Janae Singh, RT

## 2019-12-30 NOTE — PLAN OF CARE
Patient remains vented.  Versed stopped this morning and kept off the rest of day.  Dilaudid prn for pain.  2 pressure support trials today that patient did well at but patient too sleep this morning to extubate so hopeful extubation in am.  Opens eyes but will not follow command.  Shakes head no when I ask her to follow commands.  Nods head yes to pain intermittently.  Extreme grimacing and stiffness with any movement even when pre-medicated.  Also gave tylenol down feeding tube as this is what pt takes for pain at home.  Low UOP this afternoon/evening.  Currently using Nicom monitor to assess fluid status per MD order.  Echo completed this afternoon.  Replaced calcium and K+.  Remains on Levo and protonix gtt's.  Tolerating tube feeds at 20 ml/hr (goal) with no stools today but passing very foul smelling gas.  Son updated at bedside this afternoon.

## 2019-12-30 NOTE — PROGRESS NOTES
[facility] ICU RESPIRATORY NOTE        Date of Admission: 12/20/2019    Date of Intubation (most recent): 12/24/19    Reason for Mechanical Ventilation: Acute respiratory failure 2/2 shock    Number of Days on Mechanical Ventilation:     Met Criteria for Spontaneous Breathing Trial: Yes    PS 5/5 done this am, tolerated fine initially, tiring out towards the end.    Significant Events Today:     ABG Results:   Recent Labs   Lab 12/30/19  1120 12/28/19  1101 12/26/19  1000 12/24/19  1606   PH 7.34*  --   --   --    PCO2 24*  --   --   --    PO2 72*  --   --   --    HCO3 13*  --   --   --    O2PER  --  30 30% 80%       Current Vent Settings: Ventilation Mode: CMV/AC  (Continuous Mandatory Ventilation/ Assist Control)  FiO2 (%): 30 %  Rate Set (breaths/minute): 12 breaths/min  Tidal Volume Set (mL): 450 mL  PEEP (cm H2O): 5 cmH2O  Pressure Support (cm H2O): 5 cmH2O  Oxygen Concentration (%): 30 %  Resp: 23      Plan: Continue ventilatory support.  SBT in am    Beckie Anguiano, RT

## 2019-12-30 NOTE — PROGRESS NOTES
Surgery    Hemoglobin slightly decreased from 7.4-6.8 without signs of GI bleeding.  Had normal bowel movement without blood.    A/P  Getting transfusion for slow drift in hemoglobin likely not related to ongoing bleeding.  Please call if any changes.    Minh Staples M.D.  [facility]  [phone]

## 2019-12-30 NOTE — PROGRESS NOTES
"GASTROENTEROLOGY PROGRESS NOTE     SUBJECTIVE: Hemoglobin 7.4-->6.8 without any sign of GI bleeding. Getting one unit of blood. Smear of brown colored stool overnight on pad; no BM.  Opening eyes. No grimacing with abd discomfort. Discussed with RN - plan to extubate today.     OBJECTIVE:   /70   Pulse 112   Temp 98.1  F (36.7  C) (Axillary)   Resp 12   Ht 1.727 m (5' 7.99\")   Wt 54.5 kg (120 lb 2.4 oz)   SpO2 96%   BMI 18.27 kg/m     Temp (24hrs), Av.3  F (36.8  C), Min:97.7  F (36.5  C), Max:99  F (37.2  C)     Patient Vitals for the past 72 hrs:   Weight   19 0000 54.5 kg (120 lb 2.4 oz)   19 0000 51.2 kg (112 lb 14 oz)   19 0000 54.5 kg (120 lb 2.4 oz)        Intake/Output Summary (Last 24 hours) at 2019 0913  Last data filed at 2019 0800  Gross per 24 hour   Intake 3142.22 ml   Output 765 ml   Net 2377.22 ml      PHYSICAL EXAM   Constitutional: In bed, intubated   Cardiovascular: Regular rate and rhythm  Respiratory: Clear to auscultation bilaterally  Abdomen: Soft, non-tender, non-distended, normally active bowel sounds.      ROS, FH, SH: See initial GI consult for details.     I have reviewed the patient's new clinical lab results:   Recent Labs   Lab Test 19  0530 19  2300 19  1326  19  0540  19  1830  19  1321   WBC 17.0* 17.7* 16.1*   < >  --   --   --    < > 11.3*   HGB 6.8* 7.3* 7.0*   < >  --    < > 9.4*   < > 7.2*   MCV 89 89 89   < >  --   --   --    < > 87    227 201   < >  --   --   --    < > 144*   INR  --   --   --   --  1.36*  --  1.28*  --  1.26*    < > = values in this interval not displayed.      Recent Labs   Lab Test 19  0530 19  2300 19  1326 19  0415   *  --  146* 144   POTASSIUM 3.9 3.8 3.2* 3.3*   CHLORIDE 120*  --  120* 117*   CO2 17*  --  18* 17*   BUN 94*  --  100* 105*   CR 3.36*  --  3.52* 3.39*   ANIONGAP 10  --  8 10   MORGAN 6.8*  --  6.6* 6.6*      Recent Labs "   Lab Test 12/24/19  1321 12/20/19  0935 12/20/19  0555  12/19/19  1909   ALBUMIN 1.2* 2.3* 1.8*   < > 2.0*   BILITOTAL 0.2 0.8 0.5   < > 0.2   ALT 12 23 22   < > 24   AST 12 39 28   < > 33   ALKPHOS 62 45 42   < > 81   LIPASE  --   --   --   --  209    < > = values in this interval not displayed.      ENDOSCOPIC WORKUP  EGD 12/24/19  Impression:               - Normal esophagus.                             - Red blood in the gastric body. Fluid aspiration                             performed.                             - Blood in the duodenal bulb and bleeding from the                             clot site. Unable to treat a bleed of the                             Gastrodudodenal artery with endoscopic means and no                             surgical back up.                             - Multiple non-obstructing non-bleeding duodenal                             ulcers with no stigmata of bleeding in the second                             portion of the duodenum.     IMPRESSION:  59F Congolese speaking with pmh of MS and gastric ulcer disease now presented with melena and emesis on 12/20.  She was unstable prior to transfer, cardiac arrest just prior to transport. Massive transfusion protocol, 10 units PRBC.  On arrival here she was taken immediately to IR with embolization of GDA.   Developed recurrent hypotension and melena on 12/24, transferred back to ICU and got 4 units PRBC,  DDAVP, PPI drip. Intubated prior to EGD, EGD 12/24 showed oozing clot in duodenum in area of expected GDA ulcer.  No intervention done. IR embolization again on 12/25. Hemoglobin slight trend down. Bleeding has stopped. Surgery following.     PLAN/RECOMMENDATIONS:   -Continue Protonix gtt  -Continue cares per ICU   -Will discuss with Dr. Son Lamb, [facility]   Cell: [phone]  After 5PM: [phone]

## 2019-12-30 NOTE — PROGRESS NOTES
Spontaneous Breathing Trial    Criteria met for SBT (Y/N): Y    Settings:    PS: 5  PEEP: 5  FiO2: 30%    Measured Parameters:    RR: 22  Tidal volume: 540mL  RSBI: 40    Patient tolerance: Tolerated fine initially.  RR increased, Vt down towards the end of the trial.  Discussed with Dr. Antonio.        Duration of SBT: 40 minutes  Plan: ABG ordered and drawn. Possible extubation to BiPAP pending ABG results.

## 2019-12-30 NOTE — PROVIDER NOTIFICATION
Pt has been on pressure support trial x 30 min, well tolerated, notified Dr. Antonio, in room to assess pt, orders received, if ABG ok will attempt to extubate to bipap per Dr. Antonio's orders.

## 2019-12-31 NOTE — PROGRESS NOTES
[facility] ICU RESPIRATORY NOTE    Date of Admission: 12/20/2019  Date of Intubation (most recent): 12/24/2019  Reason for Mechanical Ventilation: Acute respiratory failure 2/2 shock  Number of Days on Mechanical Ventilation: 8  Met Criteria for Spontaneous Breathing Trial: No  Reason for No Spontaneous Breathing Trial: per MD    Significant Events Today: None overnight    ABG Results:   Recent Labs   Lab 12/30/19  1120 12/28/19  1101 12/26/19  1000 12/24/19  1606   PH 7.34*  --   --   --    PCO2 24*  --   --   --    PO2 72*  --   --   --    HCO3 13*  --   --   --    O2PER  --  30 30% 80%     Current Vent Settings: Ventilation Mode: CMV/AC  (Continuous Mandatory Ventilation/ Assist Control)  FiO2 (%): 30 %  Rate Set (breaths/minute): 12 breaths/min  Tidal Volume Set (mL): 450 mL  PEEP (cm H2O): 5 cmH2O  Pressure Support (cm H2O): 5 cmH2O  Oxygen Concentration (%): 30 %  Resp: 21    Plan: Continue patient on full vent support and assess for weaning readiness in AM.     Shelia Renteria, RT

## 2019-12-31 NOTE — PROGRESS NOTES
Critical Care Progress Note      12/31/2019    Name: Eliza Thurman MRN#: [id number]   Age: 59 year old YOB: 1960                  Problem List:   Active Problems:    Hemorrhagic shock (H)  upper GI bleeding, Duodenal ulcer -- recurrent   WOLFGANG  Intubated          Summary/Hospital Course:     59F Algerian speaking with pmh of MS and gastric ulcer disease now presented to OSH with melena and emesis on 12/20.  She was very unstable prior to transfer and apparently cardiac arrested just prior to transport. Got  MTP   ~ 10 units PRBC.  On arrival here she was taken immediately to IR where embolization was performed and hemostasis was apparently achieved.    Was on lazar and doing ok but then had recurrent hypotension and melena on 12/24 so back to ICU and got 4 units PRBC,  DDAVP, PPI drip and needing intubation prior to EGD for acidosis and hypotension and scope showed oozing clot in duodenum in area of expected GDA ulcer.  No intervention done.     12/29 - weaned off pressors AM, transfused 1 uPRBC for Hgb 6.8 though repeat 10       Interim History:   -  Noted maroon stools thus EGD today ulcers but no overt bleeding  - PS trials started   - sputum sent yesterday in process           Assessment and plan :     Eliza Thurman is a 59 year old female admitted on 12/20/2019 for hemorrhagic shock 2/2 upper GI bleeding     Neurology/Psychiatry:   1. H/o advanced MS--non ambulatory/immobile at baseline   3. Sedation:  Off gtts   4. S/p cardiac arrest--brief seconday to hemorrhagic shock. 8 days ago   P: follow mental status, avoid sedating medications as possible     Cardiovascular:   1.  Hemorrhagic shock: weaned off lo-dose norepinephrine 12/29 seems ,exam seems well perfused and lactate normal 12/26.  TTE 12/29 ef 65% mod AR,   -P follow hemodynamics, goal MAP >65     Pulmonary/Ventilator Management:   1. Acute respiratory failure 2/2 shock - Intubated 12/24 for shock.   - PS trials started 12.29  initially encouraging but tired out after 60 min -  P: continue vent support, optimize fluid status, PS as tolerated   - follow secretions   - fup sputum culture    GI and Nutrition:   1. Recurrent GDA ulcer bleeding with hemorrhagic shock and CODE - required embolization by IR x 2 required MTP   - s/p 4 units PRBC+ 12/24  with peak Hgb 13 now 7.4 > 6.8  - 1 unit(s) PRBC ordered 12/30   - EGD this AM noted ulcers but no active bleeding   P: continue serial Hgb,  - discontinue PPI gtt  > BID PPI   - if opens up only intervention left if still bleeding is surgery and Surgery has been following.   - gastric tube feeds --nepro started 12/29    Renal/Fluids/Electrolytes:   1. WOLFGANG with uremia;  BUN and CR stabilized UOP appropriate  - was receiving lasix for diuresis but since held 12/29   P: aim even fluid status  - electrolyte repletions  -     Infectious Disease:   1. Persistent leukocytosis but decreasing, procal mildly elevated but flat   P: FUP sputum ; low threshold to start abx         Hematology/Oncology:   1. Acute on chronic anemia seconday to bleeding - stabilized   2. Persistent Leukocytosis   P: Serial CBC, follow coags, goal Hgb >7, or if active bleed    IV/Access:   1. Venous access - central line    ICU Prophylaxis:   1. DVT: mech  2. Feeding - start nepro   3.  Disposition -ICU          Key Medications:       chlorhexidine  15 mL Mouth/Throat Q12H     heparin lock flush  5-10 mL Intracatheter Q24H     insulin aspart  1-7 Units Subcutaneous TID AC     insulin aspart  1-5 Units Subcutaneous At Bedtime     pantoprazole (PROTONIX) IV  40 mg Intravenous BID       dexmedetomidine 0.3 mcg/kg/hr (12/31/19 0958)     dextrose 20 mL/hr at 12/31/19 0947     sodium chloride 75 mL/hr at 12/31/19 0947               Physical Examination:       Intake/Output Summary (Last 24 hours) at 12/31/2019 1339  Last data filed at 12/31/2019 1200  Gross per 24 hour   Intake 2128.38 ml   Output 715 ml   Net 1413.38 ml       Wt  Readings from Last 4 Encounters:   12/31/19 55 kg (121 lb 4.1 oz)   12/20/19 41.5 kg (91 lb 7.9 oz)     BP - Mean:  [52-94] 81  Ventilation Mode: CMV/AC  (Continuous Mandatory Ventilation/ Assist Control)  FiO2 (%): 30 %  Rate Set (breaths/minute): 12 breaths/min  Tidal Volume Set (mL): 450 mL  PEEP (cm H2O): 5 cmH2O  Pressure Support (cm H2O): 5 cmH2O  Oxygen Concentration (%): 30 %  Resp: 12    GEN: no distress   opens eyes to voice.    HEENT: ETT ok   NECK: right IJ c/di  PULM: clear anteriorly  syncrhonous with vent,   CV/COR: RRR S1S2 no gallop,  No rub, no murmur  ABD: soft nontender, , no mass,  flexi-tube with less hematochezia   EXT:  3+  Edema,   Warm x4 -contractures and atrophy bilateral   SKIN: no obvious rash  LINES: clean, dry intact         Data:   All data and imaging reviewed     ROUTINE ICU LABS (Last four results)  CMP  Recent Labs   Lab 12/31/19  0455 12/30/19  0530 12/29/19  2300 12/29/19  1326 12/29/19  0415 12/28/19  0415  12/25/19  0555   * 147*  --  146* 144 144   < > 137   POTASSIUM 3.4 3.9 3.8 3.2* 3.3* 3.6   < > 4.7   CHLORIDE 122* 120*  --  120* 117* 117*   < > 112*   CO2 15* 17*  --  18* 17* 17*   < > 12*   ANIONGAP 9 10  --  8 10 10   < > 13   GLC 99 106*  --  122* 107* 90   < > 83   BUN 92* 94*  --  100* 105* 100*   < > 98*   CR 3.10* 3.36*  --  3.52* 3.39* 3.29*   < > 2.72*   GFRESTIMATED 16* 14*  --  13* 14* 15*   < > 18*   GFRESTBLACK 18* 16*  --  16* 16* 17*   < > 21*   MORGAN 6.9* 6.8*  --  6.6* 6.6* 6.9*   < > 6.3*   MAG  --   --   --   --   --   --   --  1.7   PHOS  --   --   --   --   --  6.0*  --  4.8*   PROTTOTAL 3.4*  --   --   --   --   --   --   --    ALBUMIN 1.0*  --   --   --   --   --   --   --    BILITOTAL 0.4  --   --   --   --   --   --   --    ALKPHOS 136  --   --   --   --   --   --   --    AST 18  --   --   --   --   --   --   --    ALT 12  --   --   --   --   --   --   --     < > = values in this interval not displayed.     CBC  Recent Labs   Lab  12/31/19  0455 12/30/19  1707 12/30/19  1609 12/30/19  0530 12/29/19  2300 12/29/19  1326   WBC 15.0*  --   --  17.0* 17.7* 16.1*   RBC 2.91*  --   --  2.23* 2.42* 2.30*   HGB 8.4* 10.0* 10.1* 6.8* 7.3* 7.0*   HCT 24.8* 28.6*  --  19.8* 21.5* 20.5*   MCV 85  --   --  89 89 89   MCH 28.9  --   --  30.5 30.2 30.4   MCHC 33.9  --   --  34.3 34.0 34.1   RDW 15.3*  --   --  15.5* 15.4* 15.5*     --   --  202 227 201     INR  Recent Labs   Lab 12/26/19  0540 12/25/19  1830   INR 1.36* 1.28*     Arterial Blood Gas  Recent Labs   Lab 12/30/19  1120 12/28/19  1101 12/26/19  1000 12/24/19  1606   PH 7.34*  --   --   --    PCO2 24*  --   --   --    PO2 72*  --   --   --    HCO3 13*  --   --   --    O2PER  --  30 30% 80%       All cultures:  No results for input(s): CULT in the last 168 hours.  No results found for this or any previous visit (from the past 24 hour(s)).      30 minutes CC time  respiratory failure  GI bleed , WOLFGANG

## 2019-12-31 NOTE — PLAN OF CARE
Neuro: Sedated on precedex, arouses to voice/touch, not following commands. Moves upper extremities. Multiple joints contracted.     Respiratory: LS coarse, moreso in R lobe. Full ventilator support.     Cardiac: Tele is ST. Hypotensive while sleeping.     GI: BSx4, BM this shift, maroon but formed.     : Swain patent, adequate output.     Skin: Protective mepilex to sacrum.     Pain: CPOT scores 0-1, grimaces with repositioning but otherwise relaxed.     Mobility: Bedrest this shift.     Family updated and involved in plan of care.

## 2019-12-31 NOTE — PLAN OF CARE
Pt VSS on precedex for sedation/comfort and rate control.  Pt has IVF running with precedex also.  Pt's will possibly be extubated tomorrow when son and MD can make a plan about re-intubating.  Pt was turned and had oral cares every couple hours and was cleaned up x 3 for loose dark maroon stools, MD notified and pt had a HGB checked and was stable.  Pt had 1 unit of blood this morning and her tele was [facility]

## 2019-12-31 NOTE — PLAN OF CARE
OT- Per RN, pt intubated and not appropriate for therapy today. Will complete IP OT orders at this time. Please reorder, when pt is appropriate for therapy.     Occupational Therapy Discharge Summary    Reason for therapy discharge:    Change in medical status.    Progress towards therapy goal(s). See goals on Care Plan in [facility] electronic health record for goal details.  Goals not met.  Barriers to achieving goals:   limited tolerance for therapy.    Therapy recommendation(s):    Please reorder IP OT, when pt is appropriate for therapy.

## 2019-12-31 NOTE — PLAN OF CARE
Small smears of stools tary,fadumo small amts.  ET tube pulled out with turning 1430,  etad redone and advanced 4 cm.  PCXR pending.  Minimal urine output, Dr. Weeks aware. Hgb due at 2000.  Sol. EGD with 4 mg of Versed iv push.  Less labile bp this pm.  Afebrile.  Inconsistent with following commands. NG pulled out for EGD, will check with GI if ok to place one at bedside.

## 2019-12-31 NOTE — PROGRESS NOTES
"GASTROENTEROLOGY PROGRESS NOTE     SUBJECTIVE: Sedated. Had three maroon stools yesterday/report of tarry stool overnight.     OBJECTIVE:   BP 91/54   Pulse 99   Temp 98.1  F (36.7  C) (Axillary)   Resp 14   Ht 1.727 m (5' 7.99\")   Wt 55 kg (121 lb 4.1 oz)   SpO2 95%   BMI 18.44 kg/m     Temp (24hrs), Av.3  F (36.8  C), Min:97.7  F (36.5  C), Max:99  F (37.2  C)     Patient Vitals for the past 72 hrs:   Weight   19 0400 55 kg (121 lb 4.1 oz)   19 0000 54.5 kg (120 lb 2.4 oz)   19 0000 51.2 kg (112 lb 14 oz)        Intake/Output Summary (Last 24 hours) at 2019 0913  Last data filed at 2019 0800  Gross per 24 hour   Intake 3142.22 ml   Output 765 ml   Net 2377.22 ml      PHYSICAL EXAM   Constitutional: In bed, intubated   Cardiovascular: Regular rate and rhythm  Respiratory: Lung sounds coarse  Abdomen: Soft, non-tender, non-distended, normally active bowel sounds.    ROS, FH, SH: See initial GI consult for details.     I have reviewed the patient's new clinical lab results:   Recent Labs   Lab Test 19  0455 19  1707 19  1609 19  0530 19  2300  19  0540  19  1830  19  1321   WBC 15.0*  --   --  17.0* 17.7*   < >  --   --   --    < > 11.3*   HGB 8.4* 10.0* 10.1* 6.8* 7.3*   < >  --    < > 9.4*   < > 7.2*   MCV 85  --   --  89 89   < >  --   --   --    < > 87     --   --  202 227   < >  --   --   --    < > 144*   INR  --   --   --   --   --   --  1.36*  --  1.28*  --  1.26*    < > = values in this interval not displayed.      Recent Labs   Lab Test 19  0455 19  0530 19  2300 19  1326   * 147*  --  146*   POTASSIUM 3.4 3.9 3.8 3.2*   CHLORIDE 122* 120*  --  120*   CO2 15* 17*  --  18*   BUN 92* 94*  --  100*   CR 3.10* 3.36*  --  3.52*   ANIONGAP 9 10  --  8   MORGAN 6.9* 6.8*  --  6.6*      Recent Labs   Lab Test 19  0455 19  1321 19  0935  19  1909   ALBUMIN 1.0* 1.2* 2.3*   " < > 2.0*   BILITOTAL 0.4 0.2 0.8   < > 0.2   ALT 12 12 23   < > 24   AST 18 12 39   < > 33   ALKPHOS 136 62 45   < > 81   LIPASE  --   --   --   --  209    < > = values in this interval not displayed.      ENDOSCOPIC WORKUP  EGD 12/24/19  Impression:               - Normal esophagus.                             - Red blood in the gastric body. Fluid aspiration                             performed.                             - Blood in the duodenal bulb and bleeding from the                             clot site. Unable to treat a bleed of the                             Gastrodudodenal artery with endoscopic means and no                             surgical back up.                             - Multiple non-obstructing non-bleeding duodenal                             ulcers with no stigmata of bleeding in the second                             portion of the duodenum.     IMPRESSION:  59 year old female Samoan speaking with history of MS and PUD presented with melena and emesis on 12/20.  She was unstable prior to transfer, cardiac arrest just prior to transport. Massive transfusion protocol initiated, 10 units PRBC.  On arrival here she was taken immediately to IR with embolization of GDA.   Developed recurrent hypotension and melena on 12/24, transferred back to ICU and got 4 units PRBC,  DDAVP, PPI drip. Intubated prior to EGD, EGD 12/24 showed oozing clot in duodenum in area of expected GDA ulcer.  No intervention done. IR embolization again on 12/25.   12/30 Hemoglobin 6.8-->1 unit of blood-->10-->8.4 today with return of maroon stools 12/30. Is hopefully not actively bleeding, but will repeat EGD today for further evaluation.   Surgery is following.     PLAN/RECOMMENDATIONS:   -Continue Protonix 40mg IV BID  -Hold tube feedings now for procedure   -Do not extubate. Plan for EGD this afternoon  -Monitor hemoglobin/stools   -Continue cares per ICU   -Discussed with Dr. Cline and RN. Son Lashell was given an  update, he is agreeable to this plan and has no questions. He plans to come visit his mother today.     Ana M Lamb, CNP   [facility]   Cell: [phone] until 1PM. Please call Dr. Cline after 1PM.   After 5PM: [phone]

## 2019-12-31 NOTE — PROGRESS NOTES
Surgery    Events and EGD findings reviewed.  Had a maroon stool.  Hemoglobin drifted from 10-8.4.    Abdomen-soft without tenderness.    A/P  EGD did not show any signs of bleeding at this time.  Continue ongoing medical care.  Please call if concerns for ongoing bleeding.     Minh Staples M.D.  [facility]  [phone]

## 2020-01-01 ENCOUNTER — APPOINTMENT (OUTPATIENT)
Dept: GENERAL RADIOLOGY | Facility: CLINIC | Age: 60
End: 2020-01-01
Attending: INTERNAL MEDICINE
Payer: COMMERCIAL

## 2020-01-01 ENCOUNTER — APPOINTMENT (OUTPATIENT)
Dept: CT IMAGING | Facility: CLINIC | Age: 60
End: 2020-01-01
Attending: INTERNAL MEDICINE
Payer: COMMERCIAL

## 2020-01-01 VITALS
HEIGHT: 68 IN | WEIGHT: 146.39 LBS | OXYGEN SATURATION: 97 % | RESPIRATION RATE: 20 BRPM | HEART RATE: 115 BPM | DIASTOLIC BLOOD PRESSURE: 63 MMHG | BODY MASS INDEX: 22.19 KG/M2 | TEMPERATURE: 97.4 F | SYSTOLIC BLOOD PRESSURE: 121 MMHG

## 2020-01-01 LAB
ALBUMIN SERPL-MCNC: 1 G/DL (ref 3.4–5)
ALBUMIN SERPL-MCNC: 1 G/DL (ref 3.4–5)
ALBUMIN SERPL-MCNC: 1.1 G/DL (ref 3.4–5)
ALBUMIN SERPL-MCNC: 1.3 G/DL (ref 3.4–5)
ALBUMIN SERPL-MCNC: 1.3 G/DL (ref 3.4–5)
ALP SERPL-CCNC: 133 U/L (ref 40–150)
ALP SERPL-CCNC: 143 U/L (ref 40–150)
ALP SERPL-CCNC: 156 U/L (ref 40–150)
ALP SERPL-CCNC: 164 U/L (ref 40–150)
ALP SERPL-CCNC: 171 U/L (ref 40–150)
ALT SERPL W P-5'-P-CCNC: 12 U/L (ref 0–50)
ALT SERPL W P-5'-P-CCNC: 12 U/L (ref 0–50)
ALT SERPL W P-5'-P-CCNC: 15 U/L (ref 0–50)
ALT SERPL W P-5'-P-CCNC: 15 U/L (ref 0–50)
ALT SERPL W P-5'-P-CCNC: 16 U/L (ref 0–50)
ANION GAP SERPL CALCULATED.3IONS-SCNC: 11 MMOL/L (ref 3–14)
ANION GAP SERPL CALCULATED.3IONS-SCNC: 12 MMOL/L (ref 3–14)
ANION GAP SERPL CALCULATED.3IONS-SCNC: 12 MMOL/L (ref 3–14)
ANION GAP SERPL CALCULATED.3IONS-SCNC: 9 MMOL/L (ref 3–14)
AST SERPL W P-5'-P-CCNC: 16 U/L (ref 0–45)
AST SERPL W P-5'-P-CCNC: 17 U/L (ref 0–45)
AST SERPL W P-5'-P-CCNC: 20 U/L (ref 0–45)
AST SERPL W P-5'-P-CCNC: 21 U/L (ref 0–45)
AST SERPL W P-5'-P-CCNC: 26 U/L (ref 0–45)
BACTERIA SPEC CULT: ABNORMAL
BASE DEFICIT BLDV-SCNC: 10.9 MMOL/L
BASE DEFICIT BLDV-SCNC: 14.6 MMOL/L
BASOPHILS # BLD AUTO: 0 10E9/L (ref 0–0.2)
BASOPHILS NFR BLD AUTO: 0.1 %
BASOPHILS NFR BLD AUTO: 0.2 %
BASOPHILS NFR BLD AUTO: 0.2 %
BILIRUB DIRECT SERPL-MCNC: 0.2 MG/DL (ref 0–0.2)
BILIRUB SERPL-MCNC: 0.2 MG/DL (ref 0.2–1.3)
BILIRUB SERPL-MCNC: 0.3 MG/DL (ref 0.2–1.3)
BILIRUB SERPL-MCNC: 0.5 MG/DL (ref 0.2–1.3)
BUN SERPL-MCNC: 71 MG/DL (ref 7–30)
BUN SERPL-MCNC: 71 MG/DL (ref 7–30)
BUN SERPL-MCNC: 74 MG/DL (ref 7–30)
BUN SERPL-MCNC: 79 MG/DL (ref 7–30)
BUN SERPL-MCNC: 89 MG/DL (ref 7–30)
BUN SERPL-MCNC: 90 MG/DL (ref 7–30)
CALCIUM SERPL-MCNC: 7.1 MG/DL (ref 8.5–10.1)
CALCIUM SERPL-MCNC: 7.1 MG/DL (ref 8.5–10.1)
CALCIUM SERPL-MCNC: 7.2 MG/DL (ref 8.5–10.1)
CALCIUM SERPL-MCNC: 7.3 MG/DL (ref 8.5–10.1)
CHLORIDE SERPL-SCNC: 122 MMOL/L (ref 94–109)
CHLORIDE SERPL-SCNC: 123 MMOL/L (ref 94–109)
CHLORIDE SERPL-SCNC: 124 MMOL/L (ref 94–109)
CHLORIDE SERPL-SCNC: 126 MMOL/L (ref 94–109)
CO2 SERPL-SCNC: 12 MMOL/L (ref 20–32)
CO2 SERPL-SCNC: 12 MMOL/L (ref 20–32)
CO2 SERPL-SCNC: 13 MMOL/L (ref 20–32)
CO2 SERPL-SCNC: 14 MMOL/L (ref 20–32)
CREAT SERPL-MCNC: 2.59 MG/DL (ref 0.52–1.04)
CREAT SERPL-MCNC: 2.6 MG/DL (ref 0.52–1.04)
CREAT SERPL-MCNC: 2.76 MG/DL (ref 0.52–1.04)
CREAT SERPL-MCNC: 2.77 MG/DL (ref 0.52–1.04)
CREAT SERPL-MCNC: 2.9 MG/DL (ref 0.52–1.04)
CREAT SERPL-MCNC: 3.02 MG/DL (ref 0.52–1.04)
DIFFERENTIAL METHOD BLD: ABNORMAL
EOSINOPHIL # BLD AUTO: 0.1 10E9/L (ref 0–0.7)
EOSINOPHIL NFR BLD AUTO: 0.5 %
EOSINOPHIL NFR BLD AUTO: 0.8 %
EOSINOPHIL NFR BLD AUTO: 1.1 %
ERYTHROCYTE [DISTWIDTH] IN BLOOD BY AUTOMATED COUNT: 15.5 % (ref 10–15)
ERYTHROCYTE [DISTWIDTH] IN BLOOD BY AUTOMATED COUNT: 15.6 % (ref 10–15)
ERYTHROCYTE [DISTWIDTH] IN BLOOD BY AUTOMATED COUNT: 15.7 % (ref 10–15)
ERYTHROCYTE [DISTWIDTH] IN BLOOD BY AUTOMATED COUNT: 15.8 % (ref 10–15)
ERYTHROCYTE [DISTWIDTH] IN BLOOD BY AUTOMATED COUNT: 16.1 % (ref 10–15)
GFR SERPL CREATININE-BSD FRML MDRD: 16 ML/MIN/{1.73_M2}
GFR SERPL CREATININE-BSD FRML MDRD: 17 ML/MIN/{1.73_M2}
GFR SERPL CREATININE-BSD FRML MDRD: 18 ML/MIN/{1.73_M2}
GFR SERPL CREATININE-BSD FRML MDRD: 18 ML/MIN/{1.73_M2}
GFR SERPL CREATININE-BSD FRML MDRD: 19 ML/MIN/{1.73_M2}
GFR SERPL CREATININE-BSD FRML MDRD: 19 ML/MIN/{1.73_M2}
GLUCOSE BLDC GLUCOMTR-MCNC: 104 MG/DL (ref 70–99)
GLUCOSE BLDC GLUCOMTR-MCNC: 106 MG/DL (ref 70–99)
GLUCOSE BLDC GLUCOMTR-MCNC: 109 MG/DL (ref 70–99)
GLUCOSE BLDC GLUCOMTR-MCNC: 109 MG/DL (ref 70–99)
GLUCOSE BLDC GLUCOMTR-MCNC: 113 MG/DL (ref 70–99)
GLUCOSE BLDC GLUCOMTR-MCNC: 115 MG/DL (ref 70–99)
GLUCOSE BLDC GLUCOMTR-MCNC: 116 MG/DL (ref 70–99)
GLUCOSE BLDC GLUCOMTR-MCNC: 118 MG/DL (ref 70–99)
GLUCOSE BLDC GLUCOMTR-MCNC: 121 MG/DL (ref 70–99)
GLUCOSE BLDC GLUCOMTR-MCNC: 124 MG/DL (ref 70–99)
GLUCOSE BLDC GLUCOMTR-MCNC: 124 MG/DL (ref 70–99)
GLUCOSE BLDC GLUCOMTR-MCNC: 127 MG/DL (ref 70–99)
GLUCOSE BLDC GLUCOMTR-MCNC: 130 MG/DL (ref 70–99)
GLUCOSE BLDC GLUCOMTR-MCNC: 135 MG/DL (ref 70–99)
GLUCOSE BLDC GLUCOMTR-MCNC: 73 MG/DL (ref 70–99)
GLUCOSE BLDC GLUCOMTR-MCNC: 75 MG/DL (ref 70–99)
GLUCOSE BLDC GLUCOMTR-MCNC: 76 MG/DL (ref 70–99)
GLUCOSE BLDC GLUCOMTR-MCNC: 79 MG/DL (ref 70–99)
GLUCOSE BLDC GLUCOMTR-MCNC: 81 MG/DL (ref 70–99)
GLUCOSE BLDC GLUCOMTR-MCNC: 81 MG/DL (ref 70–99)
GLUCOSE BLDC GLUCOMTR-MCNC: 83 MG/DL (ref 70–99)
GLUCOSE BLDC GLUCOMTR-MCNC: 86 MG/DL (ref 70–99)
GLUCOSE BLDC GLUCOMTR-MCNC: 90 MG/DL (ref 70–99)
GLUCOSE BLDC GLUCOMTR-MCNC: 91 MG/DL (ref 70–99)
GLUCOSE BLDC GLUCOMTR-MCNC: 96 MG/DL (ref 70–99)
GLUCOSE BLDC GLUCOMTR-MCNC: 97 MG/DL (ref 70–99)
GLUCOSE SERPL-MCNC: 103 MG/DL (ref 70–99)
GLUCOSE SERPL-MCNC: 133 MG/DL (ref 70–99)
GLUCOSE SERPL-MCNC: 136 MG/DL (ref 70–99)
GLUCOSE SERPL-MCNC: 173 MG/DL (ref 70–99)
GLUCOSE SERPL-MCNC: 84 MG/DL (ref 70–99)
GLUCOSE SERPL-MCNC: 89 MG/DL (ref 70–99)
HCO3 BLDV-SCNC: 12 MMOL/L (ref 21–28)
HCO3 BLDV-SCNC: 14 MMOL/L (ref 21–28)
HCT VFR BLD AUTO: 21.9 % (ref 35–47)
HCT VFR BLD AUTO: 24.5 % (ref 35–47)
HCT VFR BLD AUTO: 24.8 % (ref 35–47)
HCT VFR BLD AUTO: 24.9 % (ref 35–47)
HCT VFR BLD AUTO: 25.1 % (ref 35–47)
HGB BLD-MCNC: 7.5 G/DL (ref 11.7–15.7)
HGB BLD-MCNC: 8.3 G/DL (ref 11.7–15.7)
HGB BLD-MCNC: 8.5 G/DL (ref 11.7–15.7)
HGB BLD-MCNC: 8.6 G/DL (ref 11.7–15.7)
HGB BLD-MCNC: 8.7 G/DL (ref 11.7–15.7)
HGB BLD-MCNC: 8.8 G/DL (ref 11.7–15.7)
HSV1 DNA SPEC QL NAA+PROBE: POSITIVE
HSV2 DNA SPEC QL NAA+PROBE: NEGATIVE
IMM GRANULOCYTES # BLD: 0.1 10E9/L (ref 0–0.4)
IMM GRANULOCYTES # BLD: 0.1 10E9/L (ref 0–0.4)
IMM GRANULOCYTES # BLD: 0.2 10E9/L (ref 0–0.4)
IMM GRANULOCYTES NFR BLD: 1 %
IMM GRANULOCYTES NFR BLD: 1.1 %
IMM GRANULOCYTES NFR BLD: 1.3 %
INR PPP: 1.18 (ref 0.86–1.14)
INR PPP: 1.42 (ref 0.86–1.14)
LACTATE BLD-SCNC: 0.6 MMOL/L (ref 0.7–2)
LYMPHOCYTES # BLD AUTO: 1.1 10E9/L (ref 0.8–5.3)
LYMPHOCYTES # BLD AUTO: 1.1 10E9/L (ref 0.8–5.3)
LYMPHOCYTES # BLD AUTO: 1.2 10E9/L (ref 0.8–5.3)
LYMPHOCYTES NFR BLD AUTO: 10.4 %
LYMPHOCYTES NFR BLD AUTO: 10.5 %
LYMPHOCYTES NFR BLD AUTO: 7.1 %
MAGNESIUM SERPL-MCNC: 1.4 MG/DL (ref 1.6–2.3)
MAGNESIUM SERPL-MCNC: 2 MG/DL (ref 1.6–2.3)
MAGNESIUM SERPL-MCNC: 2.1 MG/DL (ref 1.6–2.3)
MAGNESIUM SERPL-MCNC: 2.1 MG/DL (ref 1.6–2.3)
MAGNESIUM SERPL-MCNC: 2.3 MG/DL (ref 1.6–2.3)
MAGNESIUM SERPL-MCNC: 2.4 MG/DL (ref 1.6–2.3)
MCH RBC QN AUTO: 29.6 PG (ref 26.5–33)
MCH RBC QN AUTO: 29.7 PG (ref 26.5–33)
MCH RBC QN AUTO: 29.8 PG (ref 26.5–33)
MCH RBC QN AUTO: 29.9 PG (ref 26.5–33)
MCH RBC QN AUTO: 30.1 PG (ref 26.5–33)
MCHC RBC AUTO-ENTMCNC: 33.9 G/DL (ref 31.5–36.5)
MCHC RBC AUTO-ENTMCNC: 34.2 G/DL (ref 31.5–36.5)
MCHC RBC AUTO-ENTMCNC: 34.3 G/DL (ref 31.5–36.5)
MCHC RBC AUTO-ENTMCNC: 34.5 G/DL (ref 31.5–36.5)
MCHC RBC AUTO-ENTMCNC: 34.7 G/DL (ref 31.5–36.5)
MCV RBC AUTO: 86 FL (ref 78–100)
MCV RBC AUTO: 87 FL (ref 78–100)
MCV RBC AUTO: 88 FL (ref 78–100)
MONOCYTES # BLD AUTO: 0.8 10E9/L (ref 0–1.3)
MONOCYTES # BLD AUTO: 0.9 10E9/L (ref 0–1.3)
MONOCYTES # BLD AUTO: 1 10E9/L (ref 0–1.3)
MONOCYTES NFR BLD AUTO: 5.9 %
MONOCYTES NFR BLD AUTO: 7.5 %
MONOCYTES NFR BLD AUTO: 9.1 %
NEUTROPHILS # BLD AUTO: 13.2 10E9/L (ref 1.6–8.3)
NEUTROPHILS # BLD AUTO: 8.4 10E9/L (ref 1.6–8.3)
NEUTROPHILS # BLD AUTO: 8.6 10E9/L (ref 1.6–8.3)
NEUTROPHILS NFR BLD AUTO: 78.1 %
NEUTROPHILS NFR BLD AUTO: 80.4 %
NEUTROPHILS NFR BLD AUTO: 84.7 %
NRBC # BLD AUTO: 0 10*3/UL
NRBC BLD AUTO-RTO: 0 /100
O2/TOTAL GAS SETTING VFR VENT: 30 %
O2/TOTAL GAS SETTING VFR VENT: 30 %
OXYHGB MFR BLDV: 69 %
OXYHGB MFR BLDV: 74 %
PCO2 BLDV: 28 MM HG (ref 40–50)
PCO2 BLDV: 30 MM HG (ref 40–50)
PH BLDV: 7.21 PH (ref 7.32–7.43)
PH BLDV: 7.31 PH (ref 7.32–7.43)
PHOSPHATE SERPL-MCNC: 3.8 MG/DL (ref 2.5–4.5)
PHOSPHATE SERPL-MCNC: 4 MG/DL (ref 2.5–4.5)
PHOSPHATE SERPL-MCNC: 4.2 MG/DL (ref 2.5–4.5)
PHOSPHATE SERPL-MCNC: 4.2 MG/DL (ref 2.5–4.5)
PHOSPHATE SERPL-MCNC: 4.7 MG/DL (ref 2.5–4.5)
PLATELET # BLD AUTO: 171 10E9/L (ref 150–450)
PLATELET # BLD AUTO: 174 10E9/L (ref 150–450)
PLATELET # BLD AUTO: 184 10E9/L (ref 150–450)
PLATELET # BLD AUTO: 185 10E9/L (ref 150–450)
PLATELET # BLD AUTO: 199 10E9/L (ref 150–450)
PO2 BLDV: 38 MM HG (ref 25–47)
PO2 BLDV: 45 MM HG (ref 25–47)
POTASSIUM SERPL-SCNC: 3.2 MMOL/L (ref 3.4–5.3)
POTASSIUM SERPL-SCNC: 3.3 MMOL/L (ref 3.4–5.3)
POTASSIUM SERPL-SCNC: 3.4 MMOL/L (ref 3.4–5.3)
POTASSIUM SERPL-SCNC: 3.5 MMOL/L (ref 3.4–5.3)
POTASSIUM SERPL-SCNC: 3.8 MMOL/L (ref 3.4–5.3)
POTASSIUM SERPL-SCNC: 4.2 MMOL/L (ref 3.4–5.3)
POTASSIUM SERPL-SCNC: 4.2 MMOL/L (ref 3.4–5.3)
POTASSIUM SERPL-SCNC: 4.8 MMOL/L (ref 3.4–5.3)
PROT SERPL-MCNC: 3.5 G/DL (ref 6.8–8.8)
PROT SERPL-MCNC: 3.6 G/DL (ref 6.8–8.8)
PROT SERPL-MCNC: 4 G/DL (ref 6.8–8.8)
PROT SERPL-MCNC: 4 G/DL (ref 6.8–8.8)
PROT SERPL-MCNC: 4.1 G/DL (ref 6.8–8.8)
RBC # BLD AUTO: 2.52 10E12/L (ref 3.8–5.2)
RBC # BLD AUTO: 2.8 10E12/L (ref 3.8–5.2)
RBC # BLD AUTO: 2.84 10E12/L (ref 3.8–5.2)
RBC # BLD AUTO: 2.89 10E12/L (ref 3.8–5.2)
RBC # BLD AUTO: 2.9 10E12/L (ref 3.8–5.2)
SODIUM SERPL-SCNC: 146 MMOL/L (ref 133–144)
SODIUM SERPL-SCNC: 147 MMOL/L (ref 133–144)
SODIUM SERPL-SCNC: 147 MMOL/L (ref 133–144)
SODIUM SERPL-SCNC: 148 MMOL/L (ref 133–144)
SODIUM SERPL-SCNC: 148 MMOL/L (ref 133–144)
SODIUM SERPL-SCNC: 150 MMOL/L (ref 133–144)
SPECIMEN SOURCE: ABNORMAL
SPECIMEN SOURCE: ABNORMAL
TRIGL SERPL-MCNC: 167 MG/DL
TRIGL SERPL-MCNC: 75 MG/DL
WBC # BLD AUTO: 10.4 10E9/L (ref 4–11)
WBC # BLD AUTO: 11 10E9/L (ref 4–11)
WBC # BLD AUTO: 15.1 10E9/L (ref 4–11)
WBC # BLD AUTO: 15.2 10E9/L (ref 4–11)
WBC # BLD AUTO: 15.6 10E9/L (ref 4–11)

## 2020-01-01 PROCEDURE — 12000000 ZZH R&B MED SURG/OB

## 2020-01-01 PROCEDURE — 83735 ASSAY OF MAGNESIUM: CPT

## 2020-01-01 PROCEDURE — 80053 COMPREHEN METABOLIC PANEL: CPT | Performed by: INTERNAL MEDICINE

## 2020-01-01 PROCEDURE — 85025 COMPLETE CBC W/AUTO DIFF WBC: CPT | Performed by: INTERNAL MEDICINE

## 2020-01-01 PROCEDURE — 25000128 H RX IP 250 OP 636: Performed by: HOSPITALIST

## 2020-01-01 PROCEDURE — 25000132 ZZH RX MED GY IP 250 OP 250 PS 637: Performed by: INTERNAL MEDICINE

## 2020-01-01 PROCEDURE — 25000125 ZZHC RX 250: Performed by: INTERNAL MEDICINE

## 2020-01-01 PROCEDURE — 40000257 ZZH STATISTIC CONSULT NO CHARGE VASC ACCESS

## 2020-01-01 PROCEDURE — 84478 ASSAY OF TRIGLYCERIDES: CPT | Performed by: INTERNAL MEDICINE

## 2020-01-01 PROCEDURE — 25800030 ZZH RX IP 258 OP 636: Performed by: INTERNAL MEDICINE

## 2020-01-01 PROCEDURE — 40000239 ZZH STATISTIC VAT ROUNDS

## 2020-01-01 PROCEDURE — C9113 INJ PANTOPRAZOLE SODIUM, VIA: HCPCS | Performed by: INTERNAL MEDICINE

## 2020-01-01 PROCEDURE — 25000128 H RX IP 250 OP 636: Performed by: INTERNAL MEDICINE

## 2020-01-01 PROCEDURE — 25000132 ZZH RX MED GY IP 250 OP 250 PS 637: Performed by: HOSPITALIST

## 2020-01-01 PROCEDURE — 99291 CRITICAL CARE FIRST HOUR: CPT | Performed by: INTERNAL MEDICINE

## 2020-01-01 PROCEDURE — 00000146 ZZHCL STATISTIC GLUCOSE BY METER IP

## 2020-01-01 PROCEDURE — 71045 X-RAY EXAM CHEST 1 VIEW: CPT

## 2020-01-01 PROCEDURE — 84100 ASSAY OF PHOSPHORUS: CPT | Performed by: INTERNAL MEDICINE

## 2020-01-01 PROCEDURE — P9041 ALBUMIN (HUMAN),5%, 50ML: HCPCS | Performed by: INTERNAL MEDICINE

## 2020-01-01 PROCEDURE — 94003 VENT MGMT INPAT SUBQ DAY: CPT

## 2020-01-01 PROCEDURE — 83735 ASSAY OF MAGNESIUM: CPT | Performed by: INTERNAL MEDICINE

## 2020-01-01 PROCEDURE — 40000008 ZZH STATISTIC AIRWAY CARE

## 2020-01-01 PROCEDURE — G0463 HOSPITAL OUTPT CLINIC VISIT: HCPCS

## 2020-01-01 PROCEDURE — 84100 ASSAY OF PHOSPHORUS: CPT

## 2020-01-01 PROCEDURE — 99233 SBSQ HOSP IP/OBS HIGH 50: CPT | Performed by: INTERNAL MEDICINE

## 2020-01-01 PROCEDURE — 25000125 ZZHC RX 250: Performed by: HOSPITALIST

## 2020-01-01 PROCEDURE — 99238 HOSP IP/OBS DSCHRG MGMT 30/<: CPT | Performed by: HOSPITALIST

## 2020-01-01 PROCEDURE — 84132 ASSAY OF SERUM POTASSIUM: CPT | Performed by: INTERNAL MEDICINE

## 2020-01-01 PROCEDURE — 99231 SBSQ HOSP IP/OBS SF/LOW 25: CPT | Performed by: HOSPITALIST

## 2020-01-01 PROCEDURE — 25800030 ZZH RX IP 258 OP 636: Performed by: OBSTETRICS & GYNECOLOGY

## 2020-01-01 PROCEDURE — 85027 COMPLETE CBC AUTOMATED: CPT | Performed by: INTERNAL MEDICINE

## 2020-01-01 PROCEDURE — 82805 BLOOD GASES W/O2 SATURATION: CPT | Performed by: OBSTETRICS & GYNECOLOGY

## 2020-01-01 PROCEDURE — 85018 HEMOGLOBIN: CPT | Performed by: INTERNAL MEDICINE

## 2020-01-01 PROCEDURE — 25000125 ZZHC RX 250

## 2020-01-01 PROCEDURE — 74174 CTA ABD&PLVS W/CONTRAST: CPT

## 2020-01-01 PROCEDURE — 40000275 ZZH STATISTIC RCP TIME EA 10 MIN

## 2020-01-01 PROCEDURE — 84478 ASSAY OF TRIGLYCERIDES: CPT

## 2020-01-01 PROCEDURE — 99231 SBSQ HOSP IP/OBS SF/LOW 25: CPT | Performed by: SURGERY

## 2020-01-01 PROCEDURE — 85610 PROTHROMBIN TIME: CPT | Performed by: INTERNAL MEDICINE

## 2020-01-01 PROCEDURE — 25800025 ZZH RX 258: Performed by: INTERNAL MEDICINE

## 2020-01-01 PROCEDURE — 40000986 XR ABDOMEN PORT 1 VW

## 2020-01-01 PROCEDURE — 80053 COMPREHEN METABOLIC PANEL: CPT

## 2020-01-01 PROCEDURE — 25000128 H RX IP 250 OP 636

## 2020-01-01 PROCEDURE — 36569 INSJ PICC 5 YR+ W/O IMAGING: CPT

## 2020-01-01 PROCEDURE — 83605 ASSAY OF LACTIC ACID: CPT | Performed by: INTERNAL MEDICINE

## 2020-01-01 PROCEDURE — 99232 SBSQ HOSP IP/OBS MODERATE 35: CPT | Performed by: HOSPITALIST

## 2020-01-01 PROCEDURE — 20000003 ZZH R&B ICU

## 2020-01-01 PROCEDURE — 80048 BASIC METABOLIC PNL TOTAL CA: CPT | Performed by: INTERNAL MEDICINE

## 2020-01-01 PROCEDURE — 82248 BILIRUBIN DIRECT: CPT | Performed by: INTERNAL MEDICINE

## 2020-01-01 PROCEDURE — 87529 HSV DNA AMP PROBE: CPT | Performed by: INTERNAL MEDICINE

## 2020-01-01 PROCEDURE — 27210222 ZZH KIT SHRLOCK 6FR POWER PICC

## 2020-01-01 PROCEDURE — 82805 BLOOD GASES W/O2 SATURATION: CPT | Performed by: INTERNAL MEDICINE

## 2020-01-01 PROCEDURE — 85610 PROTHROMBIN TIME: CPT

## 2020-01-01 RX ORDER — SUCRALFATE ORAL 1 G/10ML
1 SUSPENSION ORAL
Status: DISCONTINUED | OUTPATIENT
Start: 2020-01-01 | End: 2020-01-01 | Stop reason: HOSPADM

## 2020-01-01 RX ORDER — LORAZEPAM 2 MG/ML
INJECTION INTRAMUSCULAR
Status: COMPLETED
Start: 2020-01-01 | End: 2020-01-01

## 2020-01-01 RX ORDER — DEXTROSE MONOHYDRATE 50 MG/ML
INJECTION, SOLUTION INTRAVENOUS CONTINUOUS
Status: DISCONTINUED | OUTPATIENT
Start: 2020-01-01 | End: 2020-01-01

## 2020-01-01 RX ORDER — MAGNESIUM SULFATE HEPTAHYDRATE 40 MG/ML
2 INJECTION, SOLUTION INTRAVENOUS DAILY PRN
Status: DISCONTINUED | OUTPATIENT
Start: 2020-01-01 | End: 2020-01-01

## 2020-01-01 RX ORDER — LORAZEPAM 0.5 MG/1
.5-1 TABLET ORAL
Status: DISCONTINUED | OUTPATIENT
Start: 2020-01-01 | End: 2020-01-01 | Stop reason: HOSPADM

## 2020-01-01 RX ORDER — MEROPENEM 500 MG/1
500 INJECTION, POWDER, FOR SOLUTION INTRAVENOUS EVERY 12 HOURS
Status: COMPLETED | OUTPATIENT
Start: 2020-01-01 | End: 2020-01-01

## 2020-01-01 RX ORDER — ONDANSETRON 2 MG/ML
4 INJECTION INTRAMUSCULAR; INTRAVENOUS EVERY 6 HOURS PRN
Status: DISCONTINUED | OUTPATIENT
Start: 2020-01-01 | End: 2020-01-01 | Stop reason: HOSPADM

## 2020-01-01 RX ORDER — HALOPERIDOL 5 MG/ML
1-2 INJECTION INTRAMUSCULAR
Status: DISCONTINUED | OUTPATIENT
Start: 2020-01-01 | End: 2020-01-01 | Stop reason: HOSPADM

## 2020-01-01 RX ORDER — ATROPINE SULFATE 10 MG/ML
1-2 SOLUTION/ DROPS OPHTHALMIC
Status: DISCONTINUED | OUTPATIENT
Start: 2020-01-01 | End: 2020-01-01 | Stop reason: HOSPADM

## 2020-01-01 RX ORDER — METOPROLOL TARTRATE 1 MG/ML
5 INJECTION, SOLUTION INTRAVENOUS EVERY 6 HOURS PRN
Status: DISCONTINUED | OUTPATIENT
Start: 2020-01-01 | End: 2020-01-01

## 2020-01-01 RX ORDER — FLUMAZENIL 0.1 MG/ML
INJECTION, SOLUTION INTRAVENOUS
Status: COMPLETED
Start: 2020-01-01 | End: 2020-01-01

## 2020-01-01 RX ORDER — ACETAMINOPHEN 650 MG/1
650 SUPPOSITORY RECTAL EVERY 6 HOURS PRN
Status: DISCONTINUED | OUTPATIENT
Start: 2020-01-01 | End: 2020-01-01 | Stop reason: HOSPADM

## 2020-01-01 RX ORDER — HEPARIN SODIUM,PORCINE 10 UNIT/ML
2-5 VIAL (ML) INTRAVENOUS
Status: DISCONTINUED | OUTPATIENT
Start: 2020-01-01 | End: 2020-01-01 | Stop reason: HOSPADM

## 2020-01-01 RX ORDER — ALBUMIN, HUMAN INJ 5% 5 %
25 SOLUTION INTRAVENOUS ONCE
Status: COMPLETED | OUTPATIENT
Start: 2020-01-01 | End: 2020-01-01

## 2020-01-01 RX ORDER — ONDANSETRON 4 MG/1
4 TABLET, ORALLY DISINTEGRATING ORAL EVERY 6 HOURS PRN
Status: DISCONTINUED | OUTPATIENT
Start: 2020-01-01 | End: 2020-01-01

## 2020-01-01 RX ORDER — AMPICILLIN AND SULBACTAM 1; .5 G/1; G/1
1.5 INJECTION, POWDER, FOR SOLUTION INTRAMUSCULAR; INTRAVENOUS EVERY 12 HOURS
Status: DISCONTINUED | OUTPATIENT
Start: 2020-01-01 | End: 2020-01-01

## 2020-01-01 RX ORDER — MORPHINE SULFATE 2 MG/ML
1-2 INJECTION, SOLUTION INTRAMUSCULAR; INTRAVENOUS
Status: DISCONTINUED | OUTPATIENT
Start: 2020-01-01 | End: 2020-01-01 | Stop reason: HOSPADM

## 2020-01-01 RX ORDER — FENTANYL CITRATE 50 UG/ML
50-100 INJECTION, SOLUTION INTRAMUSCULAR; INTRAVENOUS EVERY 30 MIN PRN
Status: DISCONTINUED | OUTPATIENT
Start: 2020-01-01 | End: 2020-01-01

## 2020-01-01 RX ORDER — MAGNESIUM SULFATE HEPTAHYDRATE 40 MG/ML
4 INJECTION, SOLUTION INTRAVENOUS EVERY 4 HOURS PRN
Status: DISCONTINUED | OUTPATIENT
Start: 2020-01-01 | End: 2020-01-01

## 2020-01-01 RX ORDER — MORPHINE SULFATE 4 MG/ML
INJECTION, SOLUTION INTRAMUSCULAR; INTRAVENOUS
Status: COMPLETED
Start: 2020-01-01 | End: 2020-01-01

## 2020-01-01 RX ORDER — ATROPINE SULFATE 10 MG/ML
1-2 SOLUTION/ DROPS OPHTHALMIC
Status: DISCONTINUED | OUTPATIENT
Start: 2020-01-01 | End: 2020-01-01

## 2020-01-01 RX ORDER — MORPHINE SULFATE 10 MG/5ML
5-10 SOLUTION ORAL
Status: DISCONTINUED | OUTPATIENT
Start: 2020-01-01 | End: 2020-01-01 | Stop reason: HOSPADM

## 2020-01-01 RX ORDER — LORAZEPAM 2 MG/ML
.5-1 INJECTION INTRAMUSCULAR
Status: DISCONTINUED | OUTPATIENT
Start: 2020-01-01 | End: 2020-01-01 | Stop reason: HOSPADM

## 2020-01-01 RX ORDER — ONDANSETRON 2 MG/ML
4 INJECTION INTRAMUSCULAR; INTRAVENOUS EVERY 6 HOURS PRN
Status: DISCONTINUED | OUTPATIENT
Start: 2020-01-01 | End: 2020-01-01

## 2020-01-01 RX ORDER — MINERAL OIL/HYDROPHIL PETROLAT
OINTMENT (GRAM) TOPICAL EVERY 8 HOURS PRN
Status: DISCONTINUED | OUTPATIENT
Start: 2020-01-01 | End: 2020-01-01 | Stop reason: HOSPADM

## 2020-01-01 RX ORDER — GLYCOPYRROLATE 0.2 MG/ML
0.2 INJECTION, SOLUTION INTRAMUSCULAR; INTRAVENOUS ONCE
Status: COMPLETED | OUTPATIENT
Start: 2020-01-01 | End: 2020-01-01

## 2020-01-01 RX ORDER — SALIVA STIMULANT COMB. NO.3
1 SPRAY, NON-AEROSOL (ML) MUCOUS MEMBRANE
Status: DISCONTINUED | OUTPATIENT
Start: 2020-01-01 | End: 2020-01-01 | Stop reason: HOSPADM

## 2020-01-01 RX ORDER — IOPAMIDOL 755 MG/ML
80 INJECTION, SOLUTION INTRAVASCULAR ONCE
Status: COMPLETED | OUTPATIENT
Start: 2020-01-01 | End: 2020-01-01

## 2020-01-01 RX ORDER — GLYCOPYRROLATE 0.2 MG/ML
.1-.2 INJECTION, SOLUTION INTRAMUSCULAR; INTRAVENOUS EVERY 4 HOURS PRN
Status: DISCONTINUED | OUTPATIENT
Start: 2020-01-01 | End: 2020-01-01 | Stop reason: HOSPADM

## 2020-01-01 RX ORDER — PIPERACILLIN SODIUM, TAZOBACTAM SODIUM 2; .25 G/10ML; G/10ML
2.25 INJECTION, POWDER, LYOPHILIZED, FOR SOLUTION INTRAVENOUS EVERY 6 HOURS
Status: DISCONTINUED | OUTPATIENT
Start: 2020-01-01 | End: 2020-01-01

## 2020-01-01 RX ORDER — LIDOCAINE 40 MG/G
CREAM TOPICAL
Status: DISCONTINUED | OUTPATIENT
Start: 2020-01-01 | End: 2020-01-01 | Stop reason: HOSPADM

## 2020-01-01 RX ORDER — HYDROMORPHONE HYDROCHLORIDE 1 MG/ML
.3-.5 INJECTION, SOLUTION INTRAMUSCULAR; INTRAVENOUS; SUBCUTANEOUS
Status: DISCONTINUED | OUTPATIENT
Start: 2020-01-01 | End: 2020-01-01

## 2020-01-01 RX ORDER — FENTANYL CITRATE 50 UG/ML
50-100 INJECTION, SOLUTION INTRAMUSCULAR; INTRAVENOUS EVERY 10 MIN PRN
Status: DISCONTINUED | OUTPATIENT
Start: 2020-01-01 | End: 2020-01-01

## 2020-01-01 RX ORDER — MORPHINE SULFATE 100 MG/5ML
5-10 SOLUTION ORAL
Status: DISCONTINUED | OUTPATIENT
Start: 2020-01-01 | End: 2020-01-01 | Stop reason: HOSPADM

## 2020-01-01 RX ORDER — MORPHINE SULFATE 2 MG/ML
1-4 INJECTION, SOLUTION INTRAMUSCULAR; INTRAVENOUS
Status: DISCONTINUED | OUTPATIENT
Start: 2020-01-01 | End: 2020-01-01

## 2020-01-01 RX ORDER — ONDANSETRON 4 MG/1
4 TABLET, ORALLY DISINTEGRATING ORAL EVERY 6 HOURS PRN
Status: DISCONTINUED | OUTPATIENT
Start: 2020-01-01 | End: 2020-01-01 | Stop reason: HOSPADM

## 2020-01-01 RX ADMIN — HYDROMORPHONE HYDROCHLORIDE 0.3 MG: 1 INJECTION, SOLUTION INTRAMUSCULAR; INTRAVENOUS; SUBCUTANEOUS at 05:33

## 2020-01-01 RX ADMIN — SODIUM CHLORIDE: 9 INJECTION, SOLUTION INTRAVENOUS at 05:26

## 2020-01-01 RX ADMIN — SUCRALFATE 1 G: 1 SUSPENSION ORAL at 21:21

## 2020-01-01 RX ADMIN — Medication 40 MG: at 16:40

## 2020-01-01 RX ADMIN — HYDROMORPHONE HYDROCHLORIDE 0.2 MG: 1 INJECTION, SOLUTION INTRAMUSCULAR; INTRAVENOUS; SUBCUTANEOUS at 01:44

## 2020-01-01 RX ADMIN — MORPHINE SULFATE 5 MG: 10 SOLUTION ORAL at 22:02

## 2020-01-01 RX ADMIN — PIPERACILLIN AND TAZOBACTAM 2.25 G: 2; .25 INJECTION, POWDER, FOR SOLUTION INTRAVENOUS at 23:18

## 2020-01-01 RX ADMIN — HYDROMORPHONE HYDROCHLORIDE 0.2 MG: 1 INJECTION, SOLUTION INTRAMUSCULAR; INTRAVENOUS; SUBCUTANEOUS at 23:15

## 2020-01-01 RX ADMIN — MORPHINE SULFATE 1 MG: 2 INJECTION, SOLUTION INTRAMUSCULAR; INTRAVENOUS at 19:01

## 2020-01-01 RX ADMIN — LORAZEPAM 2 MG: 2 INJECTION INTRAMUSCULAR; INTRAVENOUS at 23:39

## 2020-01-01 RX ADMIN — DEXTROSE MONOHYDRATE: 50 INJECTION, SOLUTION INTRAVENOUS at 13:36

## 2020-01-01 RX ADMIN — CHLORHEXIDINE GLUCONATE 15 ML: 1.2 RINSE ORAL at 08:11

## 2020-01-01 RX ADMIN — PANTOPRAZOLE SODIUM 40 MG: 40 INJECTION, POWDER, FOR SOLUTION INTRAVENOUS at 08:02

## 2020-01-01 RX ADMIN — PIPERACILLIN AND TAZOBACTAM 2.25 G: 2; .25 INJECTION, POWDER, FOR SOLUTION INTRAVENOUS at 05:24

## 2020-01-01 RX ADMIN — MEROPENEM 500 MG: 500 INJECTION, POWDER, FOR SOLUTION INTRAVENOUS at 20:02

## 2020-01-01 RX ADMIN — SUCRALFATE 1 G: 1 SUSPENSION ORAL at 06:59

## 2020-01-01 RX ADMIN — LIDOCAINE HYDROCHLORIDE 1 ML: 10 INJECTION, SOLUTION EPIDURAL; INFILTRATION; INTRACAUDAL; PERINEURAL at 14:40

## 2020-01-01 RX ADMIN — POTASSIUM CHLORIDE 20 MEQ: 29.8 INJECTION, SOLUTION INTRAVENOUS at 05:56

## 2020-01-01 RX ADMIN — MEROPENEM 500 MG: 500 INJECTION, POWDER, FOR SOLUTION INTRAVENOUS at 08:09

## 2020-01-01 RX ADMIN — SODIUM CHLORIDE, POTASSIUM CHLORIDE, SODIUM LACTATE AND CALCIUM CHLORIDE 500 ML: 600; 310; 30; 20 INJECTION, SOLUTION INTRAVENOUS at 02:08

## 2020-01-01 RX ADMIN — MORPHINE SULFATE 1 MG: 4 INJECTION INTRAVENOUS at 23:22

## 2020-01-01 RX ADMIN — HYDROMORPHONE HYDROCHLORIDE 0.3 MG: 1 INJECTION, SOLUTION INTRAMUSCULAR; INTRAVENOUS; SUBCUTANEOUS at 02:08

## 2020-01-01 RX ADMIN — GLYCOPYRROLATE 0.2 MG: 0.2 INJECTION, SOLUTION INTRAMUSCULAR; INTRAVENOUS at 23:36

## 2020-01-01 RX ADMIN — Medication 40 MG: at 17:04

## 2020-01-01 RX ADMIN — PANTOPRAZOLE SODIUM 40 MG: 40 INJECTION, POWDER, FOR SOLUTION INTRAVENOUS at 23:18

## 2020-01-01 RX ADMIN — Medication 0.7 MCG/KG/HR: at 00:27

## 2020-01-01 RX ADMIN — CHLORHEXIDINE GLUCONATE 15 ML: 1.2 RINSE ORAL at 20:19

## 2020-01-01 RX ADMIN — SODIUM CHLORIDE 80 ML: 9 INJECTION, SOLUTION INTRAVENOUS at 17:04

## 2020-01-01 RX ADMIN — HYDROMORPHONE HYDROCHLORIDE 0.2 MG: 1 INJECTION, SOLUTION INTRAMUSCULAR; INTRAVENOUS; SUBCUTANEOUS at 21:57

## 2020-01-01 RX ADMIN — HYDROMORPHONE HYDROCHLORIDE 0.2 MG: 1 INJECTION, SOLUTION INTRAMUSCULAR; INTRAVENOUS; SUBCUTANEOUS at 20:45

## 2020-01-01 RX ADMIN — SUCRALFATE 1 G: 1 SUSPENSION ORAL at 13:42

## 2020-01-01 RX ADMIN — POTASSIUM CHLORIDE 20 MEQ: 1.5 POWDER, FOR SOLUTION ORAL at 08:39

## 2020-01-01 RX ADMIN — Medication 0.7 MCG/KG/HR: at 13:48

## 2020-01-01 RX ADMIN — Medication 0.4 MCG/KG/HR: at 08:31

## 2020-01-01 RX ADMIN — I.V. FAT EMULSION 250 ML: 20 EMULSION INTRAVENOUS at 21:53

## 2020-01-01 RX ADMIN — SUCRALFATE 1 G: 1 SUSPENSION ORAL at 16:40

## 2020-01-01 RX ADMIN — MEROPENEM 500 MG: 500 INJECTION, POWDER, FOR SOLUTION INTRAVENOUS at 08:02

## 2020-01-01 RX ADMIN — SUCRALFATE 1 G: 1 SUSPENSION ORAL at 17:21

## 2020-01-01 RX ADMIN — ASCORBIC ACID, VITAMIN A PALMITATE, CHOLECALCIFEROL, THIAMINE HYDROCHLORIDE, RIBOFLAVIN-5 PHOSPHATE SODIUM, PYRIDOXINE HYDROCHLORIDE, NIACINAMIDE, DEXPANTHENOL, ALPHA-TOCOPHEROL ACETATE, VITAMIN K1, FOLIC ACID, BIOTIN, CYANOCOBALAMIN: 200; 3300; 200; 6; 3.6; 6; 40; 15; 10; 150; 600; 60; 5 INJECTION, SOLUTION INTRAVENOUS at 17:16

## 2020-01-01 RX ADMIN — AMPICILLIN SODIUM AND SULBACTAM SODIUM 1.5 G: 1; .5 INJECTION, POWDER, FOR SOLUTION INTRAMUSCULAR; INTRAVENOUS at 02:32

## 2020-01-01 RX ADMIN — ACETAMINOPHEN 650 MG: 325 TABLET, FILM COATED ORAL at 08:39

## 2020-01-01 RX ADMIN — SUCRALFATE 1 G: 1 SUSPENSION ORAL at 22:09

## 2020-01-01 RX ADMIN — I.V. FAT EMULSION 250 ML: 20 EMULSION INTRAVENOUS at 20:43

## 2020-01-01 RX ADMIN — I.V. FAT EMULSION 250 ML: 20 EMULSION INTRAVENOUS at 20:32

## 2020-01-01 RX ADMIN — MEROPENEM 500 MG: 500 INJECTION, POWDER, FOR SOLUTION INTRAVENOUS at 20:19

## 2020-01-01 RX ADMIN — HYDROMORPHONE HYDROCHLORIDE 0.2 MG: 1 INJECTION, SOLUTION INTRAMUSCULAR; INTRAVENOUS; SUBCUTANEOUS at 19:52

## 2020-01-01 RX ADMIN — HYDROMORPHONE HYDROCHLORIDE 0.2 MG: 1 INJECTION, SOLUTION INTRAMUSCULAR; INTRAVENOUS; SUBCUTANEOUS at 21:23

## 2020-01-01 RX ADMIN — PANTOPRAZOLE SODIUM 40 MG: 40 INJECTION, POWDER, FOR SOLUTION INTRAVENOUS at 09:24

## 2020-01-01 RX ADMIN — ALBUMIN HUMAN 25 G: 0.05 INJECTION, SOLUTION INTRAVENOUS at 11:43

## 2020-01-01 RX ADMIN — PANTOPRAZOLE SODIUM 40 MG: 40 INJECTION, POWDER, FOR SOLUTION INTRAVENOUS at 10:44

## 2020-01-01 RX ADMIN — PANTOPRAZOLE SODIUM 40 MG: 40 INJECTION, POWDER, FOR SOLUTION INTRAVENOUS at 08:28

## 2020-01-01 RX ADMIN — MEROPENEM 500 MG: 500 INJECTION, POWDER, FOR SOLUTION INTRAVENOUS at 08:28

## 2020-01-01 RX ADMIN — MEROPENEM 500 MG: 500 INJECTION, POWDER, FOR SOLUTION INTRAVENOUS at 08:08

## 2020-01-01 RX ADMIN — MEROPENEM 500 MG: 500 INJECTION, POWDER, FOR SOLUTION INTRAVENOUS at 20:06

## 2020-01-01 RX ADMIN — Medication 0.4 MCG/KG/HR: at 15:22

## 2020-01-01 RX ADMIN — DEXTROSE MONOHYDRATE: 100 INJECTION, SOLUTION INTRAVENOUS at 19:52

## 2020-01-01 RX ADMIN — MORPHINE SULFATE 1 MG: 2 INJECTION, SOLUTION INTRAMUSCULAR; INTRAVENOUS at 11:03

## 2020-01-01 RX ADMIN — I.V. FAT EMULSION 250 ML: 20 EMULSION INTRAVENOUS at 20:10

## 2020-01-01 RX ADMIN — SODIUM CHLORIDE: 9 INJECTION, SOLUTION INTRAVENOUS at 17:02

## 2020-01-01 RX ADMIN — Medication 40 MG: at 06:30

## 2020-01-01 RX ADMIN — SUCRALFATE 1 G: 1 SUSPENSION ORAL at 05:32

## 2020-01-01 RX ADMIN — PIPERACILLIN AND TAZOBACTAM 2.25 G: 2; .25 INJECTION, POWDER, FOR SOLUTION INTRAVENOUS at 09:30

## 2020-01-01 RX ADMIN — PHYTONADIONE 5 MG: 10 INJECTION, EMULSION INTRAMUSCULAR; INTRAVENOUS; SUBCUTANEOUS at 10:15

## 2020-01-01 RX ADMIN — HYDROMORPHONE HYDROCHLORIDE 0.2 MG: 1 INJECTION, SOLUTION INTRAMUSCULAR; INTRAVENOUS; SUBCUTANEOUS at 00:37

## 2020-01-01 RX ADMIN — Medication 0.2 MCG/KG/HR: at 10:14

## 2020-01-01 RX ADMIN — MORPHINE SULFATE 5 MG: 100 SOLUTION ORAL at 06:07

## 2020-01-01 RX ADMIN — PIPERACILLIN AND TAZOBACTAM 2.25 G: 2; .25 INJECTION, POWDER, FOR SOLUTION INTRAVENOUS at 15:55

## 2020-01-01 RX ADMIN — MEROPENEM 500 MG: 500 INJECTION, POWDER, FOR SOLUTION INTRAVENOUS at 20:25

## 2020-01-01 RX ADMIN — Medication 40 MG: at 06:59

## 2020-01-01 RX ADMIN — HYDROMORPHONE HYDROCHLORIDE 0.2 MG: 1 INJECTION, SOLUTION INTRAMUSCULAR; INTRAVENOUS; SUBCUTANEOUS at 01:16

## 2020-01-01 RX ADMIN — IOPAMIDOL 80 ML: 755 INJECTION, SOLUTION INTRAVENOUS at 17:03

## 2020-01-01 RX ADMIN — MORPHINE SULFATE 1 MG: 2 INJECTION, SOLUTION INTRAMUSCULAR; INTRAVENOUS at 21:26

## 2020-01-01 RX ADMIN — ASCORBIC ACID, VITAMIN A PALMITATE, CHOLECALCIFEROL, THIAMINE HYDROCHLORIDE, RIBOFLAVIN-5 PHOSPHATE SODIUM, PYRIDOXINE HYDROCHLORIDE, NIACINAMIDE, DEXPANTHENOL, ALPHA-TOCOPHEROL ACETATE, VITAMIN K1, FOLIC ACID, BIOTIN, CYANOCOBALAMIN: 200; 3300; 200; 6; 3.6; 6; 40; 15; 10; 150; 600; 60; 5 INJECTION, SOLUTION INTRAVENOUS at 16:48

## 2020-01-01 RX ADMIN — PANTOPRAZOLE SODIUM 40 MG: 40 INJECTION, POWDER, FOR SOLUTION INTRAVENOUS at 21:40

## 2020-01-01 RX ADMIN — Medication 0.7 MCG/KG/HR: at 00:30

## 2020-01-01 RX ADMIN — MORPHINE SULFATE 2 MG: 2 INJECTION, SOLUTION INTRAMUSCULAR; INTRAVENOUS at 04:02

## 2020-01-01 RX ADMIN — MEROPENEM 500 MG: 500 INJECTION, POWDER, FOR SOLUTION INTRAVENOUS at 20:12

## 2020-01-01 RX ADMIN — SUCRALFATE 1 G: 1 SUSPENSION ORAL at 06:30

## 2020-01-01 RX ADMIN — POTASSIUM CHLORIDE 20 MEQ: 29.8 INJECTION, SOLUTION INTRAVENOUS at 08:52

## 2020-01-01 RX ADMIN — MORPHINE SULFATE 1 MG: 2 INJECTION, SOLUTION INTRAMUSCULAR; INTRAVENOUS at 08:39

## 2020-01-01 RX ADMIN — Medication 0.4 MCG/KG/HR: at 18:16

## 2020-01-01 RX ADMIN — PANTOPRAZOLE SODIUM 40 MG: 40 INJECTION, POWDER, FOR SOLUTION INTRAVENOUS at 20:44

## 2020-01-01 RX ADMIN — PANTOPRAZOLE SODIUM 40 MG: 40 INJECTION, POWDER, FOR SOLUTION INTRAVENOUS at 08:59

## 2020-01-01 RX ADMIN — CHLORHEXIDINE GLUCONATE 15 ML: 1.2 RINSE ORAL at 21:40

## 2020-01-01 RX ADMIN — Medication 40 MG: at 05:32

## 2020-01-01 RX ADMIN — MEROPENEM 500 MG: 500 INJECTION, POWDER, FOR SOLUTION INTRAVENOUS at 21:21

## 2020-01-01 RX ADMIN — MAGNESIUM SULFATE HEPTAHYDRATE 4 G: 40 INJECTION, SOLUTION INTRAVENOUS at 18:14

## 2020-01-01 RX ADMIN — AMPICILLIN SODIUM AND SULBACTAM SODIUM 1.5 G: 1; .5 INJECTION, POWDER, FOR SOLUTION INTRAMUSCULAR; INTRAVENOUS at 16:17

## 2020-01-01 RX ADMIN — MORPHINE SULFATE 1 MG: 2 INJECTION, SOLUTION INTRAMUSCULAR; INTRAVENOUS at 01:50

## 2020-01-01 RX ADMIN — SUCRALFATE 1 G: 1 SUSPENSION ORAL at 11:44

## 2020-01-01 RX ADMIN — PANTOPRAZOLE SODIUM 40 MG: 40 INJECTION, POWDER, FOR SOLUTION INTRAVENOUS at 20:07

## 2020-01-01 RX ADMIN — MORPHINE SULFATE 1 MG: 2 INJECTION, SOLUTION INTRAMUSCULAR; INTRAVENOUS at 17:32

## 2020-01-01 RX ADMIN — MORPHINE SULFATE 5 MG: 100 SOLUTION ORAL at 03:19

## 2020-01-01 RX ADMIN — ACETAMINOPHEN 650 MG: 650 SUPPOSITORY RECTAL at 10:48

## 2020-01-01 RX ADMIN — PIPERACILLIN AND TAZOBACTAM 2.25 G: 2; .25 INJECTION, POWDER, FOR SOLUTION INTRAVENOUS at 10:41

## 2020-01-01 RX ADMIN — CHLORHEXIDINE GLUCONATE 15 ML: 1.2 RINSE ORAL at 19:52

## 2020-01-01 RX ADMIN — POTASSIUM CHLORIDE 40 MEQ: 1.5 POWDER, FOR SOLUTION ORAL at 06:16

## 2020-01-01 RX ADMIN — DEXTROSE MONOHYDRATE: 50 INJECTION, SOLUTION INTRAVENOUS at 12:12

## 2020-01-01 RX ADMIN — SODIUM CHLORIDE 1000 ML: 9 INJECTION, SOLUTION INTRAVENOUS at 07:50

## 2020-01-01 RX ADMIN — MORPHINE SULFATE 1 MG: 2 INJECTION, SOLUTION INTRAMUSCULAR; INTRAVENOUS at 15:16

## 2020-01-01 RX ADMIN — SUCRALFATE 1 G: 1 SUSPENSION ORAL at 21:25

## 2020-01-01 RX ADMIN — Medication 40 MG: at 17:21

## 2020-01-01 RX ADMIN — MORPHINE SULFATE 1 MG: 2 INJECTION, SOLUTION INTRAMUSCULAR; INTRAVENOUS at 11:44

## 2020-01-01 RX ADMIN — HYDROMORPHONE HYDROCHLORIDE 0.3 MG: 1 INJECTION, SOLUTION INTRAMUSCULAR; INTRAVENOUS; SUBCUTANEOUS at 17:58

## 2020-01-01 RX ADMIN — HYDROMORPHONE HYDROCHLORIDE 0.2 MG: 1 INJECTION, SOLUTION INTRAMUSCULAR; INTRAVENOUS; SUBCUTANEOUS at 08:56

## 2020-01-01 RX ADMIN — PANTOPRAZOLE SODIUM 40 MG: 40 INJECTION, POWDER, FOR SOLUTION INTRAVENOUS at 08:08

## 2020-01-01 RX ADMIN — CALCIUM GLUCONATE: 98 INJECTION, SOLUTION INTRAVENOUS at 20:10

## 2020-01-01 RX ADMIN — FLUMAZENIL 0.2 MG: 0.1 INJECTION, SOLUTION INTRAVENOUS at 22:56

## 2020-01-01 RX ADMIN — MEROPENEM 500 MG: 500 INJECTION, POWDER, FOR SOLUTION INTRAVENOUS at 09:15

## 2020-01-01 RX ADMIN — POTASSIUM CHLORIDE: 2 INJECTION, SOLUTION, CONCENTRATE INTRAVENOUS at 20:22

## 2020-01-01 RX ADMIN — CHLORHEXIDINE GLUCONATE 15 ML: 1.2 RINSE ORAL at 08:59

## 2020-01-01 RX ADMIN — SUCRALFATE 1 G: 1 SUSPENSION ORAL at 17:03

## 2020-01-01 RX ADMIN — PANTOPRAZOLE SODIUM 40 MG: 40 INJECTION, POWDER, FOR SOLUTION INTRAVENOUS at 20:48

## 2020-01-01 RX ADMIN — MAGNESIUM SULFATE HEPTAHYDRATE 2 G: 40 INJECTION, SOLUTION INTRAVENOUS at 05:34

## 2020-01-01 RX ADMIN — MORPHINE SULFATE 1 MG: 2 INJECTION, SOLUTION INTRAMUSCULAR; INTRAVENOUS at 12:12

## 2020-01-01 RX ADMIN — MEROPENEM 500 MG: 500 INJECTION, POWDER, FOR SOLUTION INTRAVENOUS at 08:17

## 2020-01-01 RX ADMIN — HYDROMORPHONE HYDROCHLORIDE 0.2 MG: 1 INJECTION, SOLUTION INTRAMUSCULAR; INTRAVENOUS; SUBCUTANEOUS at 17:18

## 2020-01-01 RX ADMIN — CHLORHEXIDINE GLUCONATE 15 ML: 1.2 RINSE ORAL at 08:03

## 2020-01-01 RX ADMIN — HYDROMORPHONE HYDROCHLORIDE 0.2 MG: 1 INJECTION, SOLUTION INTRAMUSCULAR; INTRAVENOUS; SUBCUTANEOUS at 10:43

## 2020-01-01 RX ADMIN — CHLORHEXIDINE GLUCONATE 15 ML: 1.2 RINSE ORAL at 08:17

## 2020-01-01 RX ADMIN — HYDROMORPHONE HYDROCHLORIDE 0.2 MG: 1 INJECTION, SOLUTION INTRAMUSCULAR; INTRAVENOUS; SUBCUTANEOUS at 08:08

## 2020-01-01 RX ADMIN — SUCRALFATE 1 G: 1 SUSPENSION ORAL at 11:54

## 2020-01-01 RX ADMIN — HYDROMORPHONE HYDROCHLORIDE 0.2 MG: 1 INJECTION, SOLUTION INTRAMUSCULAR; INTRAVENOUS; SUBCUTANEOUS at 01:57

## 2020-01-01 ASSESSMENT — ACTIVITIES OF DAILY LIVING (ADL)
ADLS_ACUITY_SCORE: 37
ADLS_ACUITY_SCORE: 39
ADLS_ACUITY_SCORE: 37
ADLS_ACUITY_SCORE: 39
ADLS_ACUITY_SCORE: 37

## 2020-01-01 ASSESSMENT — MIFFLIN-ST. JEOR
SCORE: 1224.37
SCORE: 1287.37
SCORE: 1186.37
SCORE: 1213.37
SCORE: 1259.37
SCORE: 1259.37

## 2020-01-01 NOTE — PROGRESS NOTES
Critical Care Progress Note      01/01/2020    Name: Eliza Thurman MRN#: [id number]   Age: 59 year old YOB: 1960     Hospital Day     12             Problem List:   Active Problems:    Hemorrhagic shock (H)  upper GI bleeding, Duodenal ulcer -- recurrent   WOLFGANG  Acute Hypoxemic Respiratory Failure  HCAP            Summary/Hospital Course:     59F Ghanaian speaking with pmh of MS and gastric ulcer disease now presented to OSH with melena and emesis on 12/20.  She was very unstable prior to transfer and apparently cardiac arrested just prior to transport. Got  MTP   ~ 10 units PRBC.  On arrival here she was taken immediately to IR where embolization was performed and hemostasis was apparently achieved.    Was on lazar and doing ok but then had recurrent hypotension and melena on 12/24 so back to ICU and got 4 units PRBC,  DDAVP, PPI drip and needing intubation prior to EGD for acidosis and hypotension and scope showed oozing clot in duodenum in area of expected GDA ulcer.  No intervention done.     12/29 - weaned off pressors AM, transfused 1 uPRBC for Hgb 6.8 though repeat 10   12/30 PS trials started, some ? Melena, secretions from ETT , sputum sent  12/31 - repeat EGD with ulcer but no overt bleeding,       Interim History:     - Patient stacking breaths and frequently coughing. Moderate tan/yellow, thick secretions suctioned frequently from ETT. Alert and following commands but uncomfortable.  - bolused 500 ml for low UOP  - nursing noted maroon stool this AM, Hgb 8         Assessment and plan :     Eliza Thurman is a 59 year old female admitted on 12/20/2019 for hemorrhagic shock 2/2 upper GI bleeding     Neurology/Psychiatry:   1. H/o advanced MS--non ambulatory/immobile at baseline   3. Sedation:  On/off precedex, prn pain medication  4. S/p cardiac arrest--brief seconday to hemorrhagic shock. 8 days ago   P: follow mental status, avoid sedating medications as possible ; goal RASS 0      Cardiovascular:   1.  Hemorrhagic shock: weaned off lo-dose norepinephrine 12/29 seems ,though labile pressors though exam seems well perfused   TTE 12/29 ef 65% mod AR,   -P follow hemodynamics, goal MAP >65 , prn volume challenge      Pulmonary/Ventilator Management:   1. Acute respiratory failure 2/2 shock - Intubated 12/24 for shock.   - PS trials started 12.29 initially encouraging but tired out after 60 min -new secretions with polymicrobial cultures  P: continue vent support, seems to tolerate PS as mode of ventilation;   - pulmonary toilet   - start zosyn   - optimize fluid status    GI and Nutrition:   1. Recurrent GDA ulcer bleeding with hemorrhagic shock and CODE - required embolization by IR x 2 required MTP   - s/p 4 units PRBC+ 12/24  with peak Hgb 13 now 7.4 > 6.8  - 1 unit(s) PRBC ordered 12/30   - EGD 12/31 noted ulcers but no active bleeding, though continue intermittent dark stools  2. Protein calorie malnutrition, ALb 1.   P:   - replace NG   - continue serial Hgb,  - BID PPI   - if opens up only intervention left if still bleeding is surgery and Surgery has been following.   - gastric tube feeds --nepro started 12/29  - albumin bolus x 1     Renal/Fluids/Electrolytes:   1. WOLFGANG with uremia;  BUN and CR stabilized UOP appropriate  - lasix held since 12/29   P: aim even fluid status, prn challenge  - electrolyte repletions  -     Infectious Disease:   1. Strep/Proteus/Serrati in sputum >HCAP/bronchtis   - given elevated procal (miuld) and leukcotyosis) and evolving secretions   P: - start zosyn, follow-up on sensitivities       Hematology/Oncology:   1. Acute on chronic anemia seconday to bleeding - stabilized, though intermittent dark stools  2. Persistent Leukocytosis   P: Serial CBC, follow coags, goal Hgb >7, or if active bleed    IV/Access:   1. Venous access - central line    ICU Prophylaxis:   1. DVT: mech  2. Feeding - start nepro   3.  Disposition -ICU          Key Medications:        chlorhexidine  15 mL Mouth/Throat Q12H     heparin lock flush  5-10 mL Intracatheter Q24H     insulin aspart  1-7 Units Subcutaneous TID AC     insulin aspart  1-5 Units Subcutaneous At Bedtime     pantoprazole (PROTONIX) IV  40 mg Intravenous BID     piperacillin-tazobactam  3.375 g Intravenous Q6H       dexmedetomidine 0.2 mcg/kg/hr (01/01/20 1014)     dextrose 20 mL/hr at 01/01/20 0904     sodium chloride 75 mL/hr at 01/01/20 0904               Physical Examination:       Intake/Output Summary (Last 24 hours) at 1/1/2020 1137  Last data filed at 1/1/2020 1000  Gross per 24 hour   Intake 2312.42 ml   Output 535 ml   Net 1777.42 ml         Wt Readings from Last 4 Encounters:   01/01/20 56.3 kg (124 lb 1.9 oz)   12/20/19 41.5 kg (91 lb 7.9 oz)     BP - Mean:  [50-96] 81  Ventilation Mode: CMV/AC  (Continuous Mandatory Ventilation/ Assist Control)  FiO2 (%): 30 %  Rate Set (breaths/minute): 12 breaths/min  Tidal Volume Set (mL): 450 mL  PEEP (cm H2O): 5 cmH2O  Pressure Support (cm H2O): 8 cmH2O  Oxygen Concentration (%): 30 %  Resp: 20    GEN: no distress  opens eyes to voice.    HEENT: ETT ok   NECK: right IJ c/di  PULM: clear anteriorly  synchronous with vent,   CV/COR: RRR S1S2 no gallop,  No rub, no murmur  ABD: soft nontender, , no mass,  flexi-tube with less hematochezia   EXT:  3+  Edema,   Warm x4 -contractures and atrophy bilateral   SKIN: no obvious rash  LINES: clean, dry intact         Data:   All data and imaging reviewed     ROUTINE ICU LABS (Last four results)  CMP  Recent Labs   Lab 01/01/20  0440 12/31/19  0455 12/30/19  0530 12/29/19  2300 12/29/19  1326  12/28/19  0415   * 146* 147*  --  146*   < > 144   POTASSIUM 3.2* 3.4 3.9 3.8 3.2*   < > 3.6   CHLORIDE 122* 122* 120*  --  120*   < > 117*   CO2 14* 15* 17*  --  18*   < > 17*   ANIONGAP 11 9 10  --  8   < > 10   GLC 84 99 106*  --  122*   < > 90   BUN 90* 92* 94*  --  100*   < > 100*   CR 3.02* 3.10* 3.36*  --  3.52*   < > 3.29*    GFRESTIMATED 16* 16* 14*  --  13*   < > 15*   GFRESTBLACK 19* 18* 16*  --  16*   < > 17*   MORGAN 7.1* 6.9* 6.8*  --  6.6*   < > 6.9*   PHOS  --   --   --   --   --   --  6.0*   PROTTOTAL 3.5* 3.4*  --   --   --   --   --    ALBUMIN 1.0* 1.0*  --   --   --   --   --    BILITOTAL 0.3 0.4  --   --   --   --   --    ALKPHOS 143 136  --   --   --   --   --    AST 21 18  --   --   --   --   --    ALT 12 12  --   --   --   --   --     < > = values in this interval not displayed.     CBC  Recent Labs   Lab 01/01/20  0440 12/31/19  1545 12/31/19  0455 12/30/19  1707  12/30/19  0530 12/29/19  2300   WBC 11.0  --  15.0*  --   --  17.0* 17.7*   RBC 2.90*  --  2.91*  --   --  2.23* 2.42*   HGB 8.6* 9.3* 8.4* 10.0*   < > 6.8* 7.3*   HCT 24.9*  --  24.8* 28.6*  --  19.8* 21.5*   MCV 86  --  85  --   --  89 89   MCH 29.7  --  28.9  --   --  30.5 30.2   MCHC 34.5  --  33.9  --   --  34.3 34.0   RDW 15.5*  --  15.3*  --   --  15.5* 15.4*     --  170  --   --  202 227    < > = values in this interval not displayed.     INR  Recent Labs   Lab 12/26/19  0540 12/25/19  1830   INR 1.36* 1.28*     Arterial Blood Gas  Recent Labs   Lab 01/01/20  0505 12/30/19  1120 12/28/19  1101 12/26/19  1000   PH  --  7.34*  --   --    PCO2  --  24*  --   --    PO2  --  72*  --   --    HCO3  --  13*  --   --    O2PER 30  --  30 30%       All cultures:  Recent Labs   Lab 12/30/19  1245   CULT Heavy growth  Proteus mirabilis  Susceptibility testing in progress  *  Heavy growth  Serratia marcescens  Susceptibility testing in progress  *  Heavy growth  Streptococcus anginosus  Susceptibility testing in progress  *  Light growth  Normal mikhail    Culture in progress     Recent Results (from the past 24 hour(s))   XR Chest Port 1 View    Narrative    CHEST ONE VIEW SEMI-UPRIGHT 12/31/2019 4:00 PM     HISTORY: ETT migrated post EGD, need evaluate position.    COMPARISON: 12/30/2019      Impression    IMPRESSION: Endotracheal tube tip 3 cm from the  sheila. Right IJ line  noted, no pneumothorax. Probable small left effusion and compressive  atelectasis and/or infiltrate.   XR Chest Port 1 View    Narrative    CHEST PORTABLE ONE VIEW   1/1/2020 6:25 AM     HISTORY: Intubated.    COMPARISON: 12/31/2019.      Impression    IMPRESSION: Stable ET tube above the sheila. Right neck central line  tip at the SVC. Persistent opacity at the left lung base that may be  atelectasis or airspace disease. Small left pleural fluid appears  slightly more prominent. Right lung is clear. Patient is rotated.  Stable cardiac silhouette.    JO ANN RAMOS MD         30 minutes CC time  respiratory failure  GI bleed , WOLFGANG

## 2020-01-01 NOTE — PLAN OF CARE
Pt remains intubated and sedated. Follows commands. Maroon/black BM x1. BP soft. Low urine output. Albumin given. NG placed, xray pending. Abx started. Possible extubation. Continue to monitor.

## 2020-01-01 NOTE — PLAN OF CARE
Notified MD at 0215 AM regarding change in condition and low urine output.      Spoke with: Dr. Daniel    Orders were obtained.    Comments: Patient stacking breaths and frequently coughing. Moderate tan/yellow, thick secretions suctioned frequently from ETT. Alert and following commands but uncomfortable. Dex gtt and prn Dilaudid given. Placing patient on pressure support.     Continued lowish urine output. Soft pressures/tachy 100's. 500cc LR fluid bolus ordered.

## 2020-01-02 PROBLEM — E43 SEVERE MALNUTRITION (H): Status: ACTIVE | Noted: 2020-01-01

## 2020-01-02 NOTE — PROGRESS NOTES
ICU Update    Cased discussed with Rads/GI previously - patient continues to show signs of clinical blood loss (ongoing maroon, black stools), hemodynamic lability and driftof HGb requiring intermittent transfusion.  Blind operative approach not option given risk, EGD limited due to stenosis. Discussed capsule study but not reliable. We do not have imaging of her abdomen and thus CT angio option, Risk however given decrease GFR is for further renal insult.  At this point the the diagnosticbenefit of CT ABD /pelvis and angio outweighs potential risk as patient is continuing to decline (bleed) despite definitive diagnosis and current supportive measures. Family pending arrival to discuss.      Fritz Antonio MD

## 2020-01-02 NOTE — PLAN OF CARE
Pt remains unchanged on the vent. Pt stoolled twice today. Maroon in color. HGB remains stable at 8.8. Will continue to monitor.

## 2020-01-02 NOTE — PLAN OF CARE
Patient had 2 small liquid maroon/black stools overnight. Hgb 8.8 --> 7.5. VSS. UOP 10-25/hr. Minimal vent support. Minimal vent support.  Pressure support trial 7/5 for 3 hours. On dex @ 0.3, alert and following commands.

## 2020-01-02 NOTE — PROGRESS NOTES
The surgical team was called to see patient again. She passed four dark stools over the last day. Her Hgb went from 8.8 to 7.5 over 12 hours. Not on pressors.  I do not know if she is bleeding slowly or is passing old blood.   There is no role for blind operative exploration. We do not want to explore her and not know what part of her GI tract to treat.   If bleeding continues, then we should continue to evaluate her for a bleeding source. This might involve GI and radiologic assistance.   EGD from 12/24 was apparently able to see duodenum, EGD on 12/31 did not traverse pylorus.   If active bleeding we might consider angiogram or EGD.  We will follow with team.

## 2020-01-02 NOTE — PROGRESS NOTES
RN switched pt to full support on vent due low sats. Will continue to monitor.     Topher Pulido RT on 1/2/2020 at 12:21 AM

## 2020-01-02 NOTE — PROGRESS NOTES
CLINICAL NUTRITION SERVICES - REASSESSMENT NOTE      Future/Additional Recommendations: Recommend resume TF as able --> goal will be Nepro at 35 mL/hr to provide:  1512 kcal (31 kcal/kg), 68 g protein (1.4 g/kg), 135 g CHO, 11 g fiber, 613 mL H2O  Total with D10= 1675 kcal (34 kcal/kg)    If unable to resume TF and achieve above goal in the next 24-48 hours, recommend TPN    Malnutrition: (12/27)  % Weight Loss:  Unable to determine without history   % Intake:  </= 50% for >/= 5 days (severe malnutrition)  Subcutaneous Fat Loss:  Orbital region moderate depletion  Muscle Loss:  Temporal region severe depletion and Clavicle bone region severe depletion, also likely muscle loss in lower extremities due to bed bound status (unable to examine due to compression socks)  Fluid Retention:  Severe 3+ in extremities      Malnutrition Diagnosis: Severe malnutrition  In Context of:  Acute illness or injury  Chronic illness or disease       EVALUATION OF PROGRESS TOWARD GOALS   Diet:  NPO on vent     Nutrition Support:  Patient's was receiving TF (Nepro at 20 mL/hr) until 12/31 when FT was pulled for EGD.  Patient has been NPO since.  Now day #14 sub-optimal nutrition.      ASSESSED NUTRITION NEEDS:  Dosing Weight 49 kg (12/21)  Estimated Energy Needs: 7554-3233 kcals (30-35 Kcal/Kg)  Justification: appears underweight, vented   Estimated Protein Needs: 60-75 grams protein (1.2-1.5 g pro/Kg)  Justification: repletion, hypercatabolism with acute illness and high BUN      NEW FINDINGS:   Na 148 (H)  BUN 89 (H), Cr 2.9 (H) - WOLFGANG  BGM 73-90 with Medium sliding scale insulin    BM x 2 today and x 2 yesterday   mL  I/O 3164/586, weight up to 59 kg (up 10 kg from admit)    Patient receiving D10 at 20 mL/hr (started 12/25) = 48 g CHO, 163 kcal     Previous Goals (12/30):   Patient will reach goal TF within the next 24 hours   Evaluation: Not met    Previous Nutrition Diagnosis (12/30):   Inadequate enteral nutrition infusion  related to TF running at 20 mL/hr per MD orders as evidenced by meeting ~55% needs via current regimen   Evaluation: Declining      CURRENT NUTRITION DIAGNOSIS  Inadequate protein-energy intake related to TF on hold, receiving D10 IVF as evidenced by meeting 0% protein and 10% energy needs from D10     INTERVENTIONS  Recommendations / Nutrition Prescription  Recommend resume TF as able --> goal will be Nepro at 35 mL/hr to provide:  1512 kcal (31 kcal/kg), 68 g protein (1.4 g/kg), 135 g CHO, 11 g fiber, 613 mL H2O  Total with D10= 1675 kcal (34 kcal/kg)    If unable to resume TF and achieve above goal in the next 24-48 hours, recommend TPN     Implementation  None     Goals  Patient will receive nutrition within the next 24 hours     MONITORING AND EVALUATION:  Progress towards goals will be monitored and evaluated per protocol and Practice Guidelines    Lucy Garcia RD, LD, CNSC   Clinical Dietitian - [facility]

## 2020-01-02 NOTE — PROVIDER NOTIFICATION
Paged Dr. Staples regarding hgb trending down, dark stools and inquiring if going to intervene surgically.    Dr. Staples stated that at this point they wouldn't surgically intervene but would recommend a transfusion if intensivist was in agreement

## 2020-01-02 NOTE — PROGRESS NOTES
Critical Care Progress Note      01/02/2020    Name: Eliza Thurman MRN#: [id number]   Age: 59 year old YOB: 1960     Hospital Day     13             Problem List:   Active Problems:    Hemorrhagic shock (H)  upper GI bleeding, Duodenal ulcer -- recurrent   WOLFGANG  Acute Hypoxemic Respiratory Failure  HCAP            Summary/Hospital Course:     59F [place] speaking with pmh of MS and gastric ulcer disease now presented to OSH with melena and emesis on 12/20.  She was very unstable prior to transfer and apparently cardiac arrested just prior to transport. Got  MTP   ~ 10 units PRBC.  On arrival here she was taken immediately to IR where embolization was performed and hemostasis was apparently achieved.    Was on lazar and doing ok but then had recurrent hypotension and melena on 12/24 so back to ICU and got 4 units PRBC,  DDAVP, PPI drip and needing intubation prior to EGD for acidosis and hypotension and scope showed oozing clot in duodenum in area of expected GDA ulcer.  No intervention done.     12/29 - weaned off pressors AM, transfused 1 uPRBC for Hgb 6.8 though repeat 10   12/30 PS trials started, some ? Melena, secretions from ETT , sputum sent  12/31 - repeat EGD with ulcer but no overt bleeding,   1/1 - abx started for       Interim History:     - abx started for polymicrobial sputum  - ongoing maroon stool this AM, Hgb drifting down   - surgery aware,         Assessment and plan :     Eliza Thurman is a 59 year old female admitted on 12/20/2019 for hemorrhagic shock 2/2 upper GI bleeding     Neurology/Psychiatry:   1. H/o advanced MS--non ambulatory/immobile at baseline   3. Sedation:  On/off precedex, prn pain medication  4. Encephalopathy - S/p cardiac arrest--brief seconday to hemorrhagic shock. 12/24  P: follow mental status, avoid sedating medications as possible ; goal RASS 0     Cardiovascular:   1.  Hemorrhagic shock: weaned off lo-dose norepinephrine 12/29 seems ,though labile  pressors though exam seems well perfused   TTE 12/29 ef 65% mod AR,   -P follow hemodynamics, goal MAP >65 , prn volume challenge      Pulmonary/Ventilator Management:   1. Acute respiratory failure 2/2 shock - Intubated 12/24 for shock.   - PS trials started 12.29 variable but improving , however unstable GI status and mentation precluding extubation   -new secretions with polymicrobial cultures  P: continue vent support, seems to tolerate PS as mode of ventilation with adequate resp drive ;   - pulmonary toilet   - de-escalate  zosyn to unasyn  - optimize fluid status    GI and Nutrition:   1. Recurrent GDA ulcer bleeding with hemorrhagic shock and CODE - required embolization by IR x 2 required MTP   - s/p 4 units PRBC+ 12/24  with peak Hgb 13 now 7.4 > 6.8  - 1 unit(s) PRBC ordered 12/30   - EGD 12/31 noted ulcers but no active bleeding, though continue intermittent dark stools, unable to visualize through stenotic duodenum thus imaging limited  2. Protein calorie malnutrition, ALb 1. Overall minimal to no nutrition since admission given ongoing GI status   P:   - serial Hgb,  - BID PPI   - CT ABD  pelvis - eval for bleed source  - ongoing discussion with Surg/IR    - gastric tube feeds --nepro started 12/29  - PICC placement and TPN       Renal/Fluids/Electrolytes:   1. WOLFGANG with uremia;  BUN and CR stabilized UOP appropriate  - lasix held since 12/29   P: aim even fluid status, prn challenge  - electrolyte repletions  -     Infectious Disease:   1. Strep/Proteus/Serrati in sputum >HCAP/bronchtis   - given elevated procal (miuld) and leukcotyosis) and evolving secretions   P: - change zosyn to unasyn       Hematology/Oncology:   1. Acute on chronic anemia seconday to bleeding - clinically slow ooze given intermittent dark stools  2. Persistent Leukocytosis   P: Serial CBC, follow coags, goal Hgb >7, or if active bleed      IV/Access:   1. Venous access - central line  Consult for PICC     ICU Prophylaxis:   1.  DVT: mech  2. Feeding - NPO , TPN   3.  Disposition -ICU          Key Medications:       chlorhexidine  15 mL Mouth/Throat Q12H     heparin lock flush  5-10 mL Intracatheter Q24H     insulin aspart  1-7 Units Subcutaneous TID AC     insulin aspart  1-5 Units Subcutaneous At Bedtime     pantoprazole (PROTONIX) IV  40 mg Intravenous BID     piperacillin-tazobactam  2.25 g Intravenous Q6H       dexmedetomidine 0.3 mcg/kg/hr (01/02/20 0801)     dextrose 20 mL/hr at 01/02/20 0801     sodium chloride 75 mL/hr at 01/02/20 0801               Physical Examination:       Intake/Output Summary (Last 24 hours) at 1/2/2020 1315  Last data filed at 1/2/2020 1200  Gross per 24 hour   Intake 2767.39 ml   Output 744 ml   Net 2023.39 ml         Wt Readings from Last 4 Encounters:   01/02/20 59 kg (130 lb 1.1 oz)   12/20/19 41.5 kg (91 lb 7.9 oz)     BP - Mean:  [] 94  Ventilation Mode: CMV/AC  (Continuous Mandatory Ventilation/ Assist Control)  FiO2 (%): 30 %  Rate Set (breaths/minute): 12 breaths/min  Tidal Volume Set (mL): 450 mL  PEEP (cm H2O): 5 cmH2O  Pressure Support (cm H2O): 7 cmH2O  Oxygen Concentration (%): 30 %  Resp: 19    GEN: no distress opens eyes to voice.    HEENT: ETT ok   NECK: right IJ c/di  PULM: clear anteriorly  synchronous with vent,   CV/COR: RRR S1S2 no gallop,  No rub, no murmur  ABD: soft nontender, , no mass,  flexi-tube with less hematochezia   EXT:  3+  Edema, warm x4 -contractures and atrophy bilateral   SKIN: no obvious rash  LINES: clean, dry intact         Data:   All data and imaging reviewed     ROUTINE ICU LABS (Last four results)  CMP  Recent Labs   Lab 01/02/20  0430 01/01/20  1325 01/01/20  0440 12/31/19  0455 12/30/19  0530  12/28/19  0415   *  --  147* 146* 147*   < > 144   POTASSIUM 3.8 4.2 3.2* 3.4 3.9   < > 3.6   CHLORIDE 124*  --  122* 122* 120*   < > 117*   CO2 13*  --  14* 15* 17*   < > 17*   ANIONGAP 11  --  11 9 10   < > 10   GLC 89  --  84 99 106*   < > 90   BUN 89*  --   90* 92* 94*   < > 100*   CR 2.90*  --  3.02* 3.10* 3.36*   < > 3.29*   GFRESTIMATED 17*  --  16* 16* 14*   < > 15*   GFRESTBLACK 20*  --  19* 18* 16*   < > 17*   MORGAN 7.1*  --  7.1* 6.9* 6.8*   < > 6.9*   PHOS  --   --   --   --   --   --  6.0*   PROTTOTAL 3.6*  --  3.5* 3.4*  --   --   --    ALBUMIN 1.3*  --  1.0* 1.0*  --   --   --    BILITOTAL 0.5  --  0.3 0.4  --   --   --    ALKPHOS 133  --  143 136  --   --   --    AST 16  --  21 18  --   --   --    ALT 12  --  12 12  --   --   --     < > = values in this interval not displayed.     CBC  Recent Labs   Lab 01/02/20  0430 01/01/20  1604 01/01/20  0440 12/31/19  1545 12/31/19  0455 12/30/19  1707  12/30/19  0530   WBC 10.4  --  11.0  --  15.0*  --   --  17.0*   RBC 2.52*  --  2.90*  --  2.91*  --   --  2.23*   HGB 7.5* 8.8* 8.6* 9.3* 8.4* 10.0*   < > 6.8*   HCT 21.9*  --  24.9*  --  24.8* 28.6*  --  19.8*   MCV 87  --  86  --  85  --   --  89   MCH 29.8  --  29.7  --  28.9  --   --  30.5   MCHC 34.2  --  34.5  --  33.9  --   --  34.3   RDW 15.6*  --  15.5*  --  15.3*  --   --  15.5*     --  185  --  170  --   --  202    < > = values in this interval not displayed.     INR  No lab results found in last 7 days.  Arterial Blood Gas  Recent Labs   Lab 01/01/20  0505 12/30/19  1120 12/28/19  1101   PH  --  7.34*  --    PCO2  --  24*  --    PO2  --  72*  --    HCO3  --  13*  --    O2PER 30  --  30       All cultures:  Recent Labs   Lab 12/30/19  1245   CULT Heavy growth  Proteus mirabilis  *  Heavy growth  Serratia marcescens  *  Moderate growth  Strain 2  Serratia marcescens  Susceptibility testing in progress  *  Moderate growth  Escherichia coli  Susceptibility testing in progress  *  Heavy growth  Streptococcus anginosus  *  Light growth  Normal mikhail       Recent Results (from the past 24 hour(s))   XR Abdomen Port 1 View    Narrative    XR PORTABLE ABDOMEN ONE VIEW   1/1/2020 2:40 PM     HISTORY: Nasogastric tube placement.    COMPARISON: None.       Impression    IMPRESSION: Nasogastric tube tip in the expected position of the  stomach. Dilated small bowel loops identified. Left lung base opacity  or atelectasis again noted. Small left base pleural fluid.    JO ANN RAMOS MD   XR Chest Port 1 View    Narrative    CHEST ONE VIEW PORTABLE   1/2/2020 6:40 AM     HISTORY: Mechanical ventilation.    COMPARISON: 1/1/2020.      Impression    IMPRESSION: Endotracheal tube is not significantly changed, with tip  3.9 cm above the sheila. Right IJ central venous catheter, with tip in  the mid SVC. An enteric tube, tip below the diaphragm. Small left  pleural effusion has a similar appearance to the previous exam, given  differences in technique. Heart size appears stable. Pulmonary  vascularity is within normal limits.    CLEMENT GARCIA MD         30 minutes CC time  respiratory failure  GI bleed , WOLFGANG

## 2020-01-02 NOTE — CONSULTS
BRIEF NUTRITION NOTE:    Was asked to initiate TPN with Pharmacist for patient.  A full Nutrition Reassessment was completed earlier today.  Se note for details.    NEW FINDINGS:  A PICC will be placed for PN.  Pt is at risk for refeeding syndrome given prolonged inadequate nutritional intake (x14 days).  K 3.8 (NL)  Last Phos on 12/28 6.0 (H)  Last Mg on 12/25 1.7 (NL)  Have ordered add ons for Phos, Mg, and TG.    REASSESSED NUTRITION NEEDS:  Dosing Weight 49 kg (12/21)  Estimated Energy Needs: (Changed to 3 step progression):  Step 1:  735-980 kcals (15-20 kcal/kg)  Step 2:  7128-2864 kcals (25-30 kcal/kg)  Step 3:  9572-5781 kcals (30-35 Kcal/Kg)  Justification: appears underweight, vented   Estimated Protein Needs: 60-75 grams protein (1.2-1.5 g pro/Kg)  Justification: repletion, hypercatabolism with acute illness and high BUN    INTERVENTIONS:  Enteral Nutrition --> Canceled these orders in Epic for now.    Parenteral Nutrition/IV Fluids -->   Step 1:  Begin TPN D15 AA5 at 35 mL/hr + Lipids 250 mL every other day = 846 kcals (17 kcal/kg), 42 gm pro (0.9 gm/kg), 126 gm CHO, 30% fat  Stop D10 IVF  Decrease maintenance IVF NaCl to 40 mL/hr.    Step 2:  After 24 hrs, if labs acceptable (K, Mg, Phos), increase TPN D15 AA5 to 50 mL/hr + Lipids 250 mL 5x/week (Mon-Fri) = 1209 kcals (25 kcal/kg), 60 gm pro (1.2 gm/kg), 180 gm CHO, 30% fat.  Decrease maintenance IVF to 25 mL/hr.    Step 3:  After 24 hrs if labs acceptable (K, Mg, Phos), increase TPN D15 AA5 to goal 65 mL/hr + Lipids 250 mL daily = 1608 kcals (33 kcal/kg), 78 gm pro (1.6 gm/kg and 104% pro needs), 234 gm CHO (GIR 3.3), 31% fat  Decrease maintenance IVF to 10 mL/hr.    Rupinedr Hammond, RD, LD, CNSC

## 2020-01-03 NOTE — PLAN OF CARE
Notified MD at 2200 PM regarding critical results read back.      Spoke with: Dr. Laird    Orders were not obtained.    Comments: Sputum culture from 12/30 positive for ESBL

## 2020-01-03 NOTE — PROGRESS NOTES
General surgery note    Discussed with ICU staff, no surgical intervention plans  No surgical target, no benefits.

## 2020-01-03 NOTE — PLAN OF CARE
Alert and following commands. VSS, soft BP at times. Vent support. Precedex at 0.4. PICC inserted. TPN started. No BM's today, +flatus. CT scan with contrast of abd and pelvis.

## 2020-01-03 NOTE — PROGRESS NOTES
Critical Care Progress Note      01/03/2020    Name: Eliza Thurman MRN#: [id number]   Age: 59 year old YOB: 1960     Hospital Day     14             Problem List:   Active Problems:    Hemorrhagic shock (H)    Severe malnutrition (H)  upper GI bleeding, Duodenal ulcer -- recurrent   WOLFGANG  Acute Hypoxemic Respiratory Failure  HCAP            Summary/Hospital Course:     59F [place] speaking with pmh of MS and gastric ulcer disease now presented to OSH with melena and emesis on 12/20.  She was very unstable prior to transfer and apparently cardiac arrested just prior to transport. Got  MTP   ~ 10 units PRBC.  On arrival here she was taken immediately to IR where embolization was performed and hemostasis was apparently achieved.    Was on lazar and doing ok but then had recurrent hypotension and melena on 12/24 so back to ICU and got 4 units PRBC,  DDAVP, PPI drip and needing intubation prior to EGD for acidosis and hypotension and scope showed oozing clot in duodenum in area of expected GDA ulcer.  No intervention done.     12/29 - weaned off pressors AM, transfused 1 uPRBC for Hgb 6.8 though repeat 10   12/30 PS trials started, some ? Melena, secretions from ETT , sputum sent  12/31 - repeat EGD with ulcer but no overt bleeding,   1/1 - abx started for polymicrobial sputum   1/2 - CT ABD pelvis angio ordered eval for source of bleed       Interim History:     - trials 7/5 with PS 3 hrs, issue with bleeding precluded extubation and secretions precluding extubation  - sputum with ESBL - abx changed this AM  - CT no obvious source, reviewed with Surgery        Assessment and plan :     Eliza Thurman is a 59 year old female admitted on 12/20/2019 for hemorrhagic shock 2/2 upper GI bleeding     Neurology/Psychiatry:   1. H/o advanced MS--non ambulatory/immobile at baseline   3. Sedation:  On/off precedex, prn pain medication  4. Encephalopathy - S/p cardiac arrest--brief seconday to hemorrhagic shock.  12/24  P: follow mental status, avoid sedating medications as possible ; goal RASS 0     Cardiovascular:   1.  Hemorrhagic shock: weaned off lo-dose norepinephrine 12/29 seems ,though labile pressors though exam seems well perfused   TTE 12/29 ef 65% mod AR,   -P follow hemodynamics, goal MAP >65 , prn volume challenge      Pulmonary/Ventilator Management:   1. Acute respiratory failure 2/2 shock - Intubated 12/24 for shock.   - PS trials started 12.29 variable but improving , however unstable GI status and mentation precluding extubation   2. HCA bronchitis /PNA - secretions with polymicrobial cultures, ESBL   P: continue vent support, seems to tolerate PS as mode of ventilation with adequate resp drive ;   - pulmonary toilet   - adjust abx to meropenem   - optimize fluid status, bolus 1 Liter    GI and Nutrition:   1. Recurrent GDA ulcer bleeding with hemorrhagic shock and CODE - required embolization by IR x 2 required MTP   - s/p 4 units PRBC+ 12/24  with peak Hgb 13 now 7.4 > 6.8  - 1 unit(s) PRBC ordered 12/30   - EGD 12/31 noted ulcers but no active bleeding, though continue intermittent dark stools, unable to visualize through stenotic duodenum thus imaging limited  - CT ABD -blush in bowel but no obvious extravasation to identify   2. Protein calorie malnutrition, ALb 1. Overall minimal to no nutrition since admission given ongoing GI status   P:   - serial Hgb,  - BID PPI   - PICC placement and initiate TPN       Renal/Fluids/Electrolytes:   1. WOLFGANG with uremia;  BUN and CR stabilized UOP appropriate  - lasix held since 12/29   - at risk given contrast 1/2 (benefit > risk)  P: aim even fluid status, prn challenge  - electrolyte repletions  -  1 liter fluid    Infectious Disease:   1. Strep/Proteus/Serrati and E Coli ESBL in sputum >HCAP/bronchtis   P: - started on zosyn >  unasyn , now to meropenem given sensitivities      Hematology/Oncology:   1. Acute on chronic anemia seconday to bleeding - clinically  slow ooze given intermittent dark stools  2. Persistent Leukocytosis - 2/2 bronchitis  P: Serial CBC, follow coags, goal Hgb >7, or if active bleed  - dose vitamin K given continued ooze      IV/Access:   1. Venous access - central line  Consult for PICC     ICU Prophylaxis:   1. DVT: mech  2. Feeding - NPO , TPN   3.  Disposition -ICU  Discussed with Sergy - plan family meeting tomorrow evening 9-10pm.          Key Medications:       sodium chloride 0.9%  1,000 mL Intravenous Once     chlorhexidine  15 mL Mouth/Throat Q12H     heparin lock flush  5-10 mL Intracatheter Q24H     insulin aspart  1-6 Units Subcutaneous Q4H     lipids  250 mL Intravenous Q48H     meropenem  1 g Intravenous Q8H     pantoprazole (PROTONIX) IV  40 mg Intravenous BID       dexmedetomidine 0.4 mcg/kg/hr (01/02/20 2200)     parenteral nutrition - Clinimix E 35 mL/hr at 01/02/20 2200     sodium chloride 40 mL/hr at 01/02/20 2200               Physical Examination:         Intake/Output Summary (Last 24 hours) at 1/3/2020 0800  Last data filed at 1/3/2020 0600  Gross per 24 hour   Intake 2324.68 ml   Output 643 ml   Net 1681.68 ml         Wt Readings from Last 4 Encounters:   01/03/20 60.1 kg (132 lb 7.9 oz)   12/20/19 41.5 kg (91 lb 7.9 oz)     BP - Mean:  [] 88  Ventilation Mode: CMV/AC  (Continuous Mandatory Ventilation/ Assist Control)  FiO2 (%): 30 %  Rate Set (breaths/minute): 12 breaths/min  Tidal Volume Set (mL): 450 mL  PEEP (cm H2O): 5 cmH2O  Oxygen Concentration (%): 30 %  Resp: 23    GEN: no distress opens eyes to voice.    HEENT: ETT ok   NECK: right IJ site c/di  PULM: clear anteriorly  synchronous with vent, copious secretions, marginal cough  CV/COR: RRR S1S2 no gallop,  No rub, no murmur  ABD: soft nontender, , no mass, hypoactive bowel sounds   EXT:  3+  Edema, warm x4 -contractures and atrophy bilateral   SKIN: no obvious rash  LINES: clean, dry intact         Data:   All data and imaging reviewed     ROUTINE ICU LABS  (Last four results)  CMP  Recent Labs   Lab 01/03/20 0424 01/02/20 2145 01/02/20  0430 01/01/20  1325 01/01/20  0440 12/31/19  0455  12/28/19  0415   *  --  148*  --  147* 146*   < > 144   POTASSIUM 3.5  --  3.8 4.2 3.2* 3.4   < > 3.6   CHLORIDE 124*  --  124*  --  122* 122*   < > 117*   CO2 12*  --  13*  --  14* 15*   < > 17*   ANIONGAP 12  --  11  --  11 9   < > 10   *  --  89  --  84 99   < > 90   BUN 79*  --  89*  --  90* 92*   < > 100*   CR 2.77*  --  2.90*  --  3.02* 3.10*   < > 3.29*   GFRESTIMATED 18*  --  17*  --  16* 16*   < > 15*   GFRESTBLACK 21*  --  20*  --  19* 18*   < > 17*   MORGAN 7.3*  --  7.1*  --  7.1* 6.9*   < > 6.9*   MAG 2.3 2.4* 1.4*  --   --   --   --   --    PHOS 3.8  --  4.2  --   --   --   --  6.0*   PROTTOTAL 4.1*  --  3.6*  --  3.5* 3.4*  --   --    ALBUMIN 1.3*  --  1.3*  --  1.0* 1.0*  --   --    BILITOTAL 0.3  --  0.5  --  0.3 0.4  --   --    ALKPHOS 156*  --  133  --  143 136  --   --    AST 20  --  16  --  21 18  --   --    ALT 15  --  12  --  12 12  --   --     < > = values in this interval not displayed.     CBC  Recent Labs   Lab 01/03/20 0424 01/02/20 2145 01/02/20  0430 01/01/20  1604 01/01/20  0440   WBC 15.1* 15.2* 10.4  --  11.0   RBC 2.84* 2.89* 2.52*  --  2.90*   HGB 8.5* 8.7* 7.5* 8.8* 8.6*   HCT 24.8* 25.1* 21.9*  --  24.9*   MCV 87 87 87  --  86   MCH 29.9 30.1 29.8  --  29.7   MCHC 34.3 34.7 34.2  --  34.5   RDW 15.8* 15.7* 15.6*  --  15.5*    199 171  --  185     INR  Recent Labs   Lab 01/03/20  0424   INR 1.42*     Arterial Blood Gas  Recent Labs   Lab 01/01/20  0505 12/30/19  1120 12/28/19  1101   PH  --  7.34*  --    PCO2  --  24*  --    PO2  --  72*  --    HCO3  --  13*  --    O2PER 30  --  30       All cultures:  Recent Labs   Lab 12/30/19  1245   CULT Heavy growth  Proteus mirabilis  *  Heavy growth  Serratia marcescens  *  Moderate growth  Strain 2  Serratia marcescens  Piperacillin/Tazobactam susceptibilities in progress  1.3.20  *   Moderate growth  Escherichia coli ESBL  ESBL (extended beta lactamase) producing organisms require contact precautions.  *  Heavy growth  Streptococcus anginosus  *  Light growth  Normal mikhail    Critical Value/Significant Value called to and read back by  Maritza Osborn RN at 2129 on 1.2.20 by SS.       Recent Results (from the past 24 hour(s))   CTV Abdomen Pelvis w Contrast    Narrative    CTV ABDOMEN PELVIS WITH CONTRAST 1/2/2020 5:20 PM     HISTORY: Recurrent gastrointestinal bleed, nonbleeding ulcers noted on  endoscopy but stenotic and can't visualize endoscopically in small  bowel, looking for source of continued bleed.    TECHNIQUE: Initial noncontrast axial images through the abdomen and  pelvis are performed. Axial images from the lung bases to the  symphysis are then performed during arterial phase and portal venous  phase of the contrast bolus. Additional coronal reformatted images are  generated. 80 mL of Isovue 370 are given intravenously. Radiation dose  for this scan was reduced using automated exposure control, adjustment  of the mA and/or kV according to patient size, or iterative  reconstruction technique.    FINDINGS:   Moderate left pleural effusion and small right pleural effusion are  noted.    Abdomen: Initial noncontrast images demonstrates residual oral  contrast within the right and left colon along with the sigmoid colon.  Additional high attenuation material is noted in the region of the  transverse duodenum on series 14, image 32 which may be embolization  coils. Probable hyperdense left renal cortical cyst measures less than  1 cm on series 14, image 28. Small amount of sludge is noted in the  gallbladder. No enlarged lymph nodes. Extensive body wall edema is  present. Moderate ascites is present.    Following contrast administration, there is a normal-appearing liver,  spleen, pancreas and adrenal glands. There is patchy arterial  enhancement of each kidney which on portal venous  phase images becomes  more homogeneous. This may be related to patient's impaired renal  function. Probable cyst lower pole left kidney on image 28 of series  13 measures nearly 1 cm. No hydronephrosis or obvious renal calculi.  No enlarged lymph nodes. Prior cholecystectomy.    Nasogastric tube terminates in the gastric lumen. Arterial phase  images demonstrate enhancement of the gastric mucosa. No evidence of  contrast extravasation into the lumen of the duodenum. Colon is  obscured by previously existing oral contrast. Tiny amount of high  attenuation material is noted in the terminal ileum, not significantly  changed compared to precontrast images. This is likely residual oral  contrast.    Pelvis: Bladder is decompressed with a Swain catheter. The uterus and  rectum are unremarkable. No enlarged pelvic lymph nodes. A large  amount of free pelvic fluid is present. Bone window examination is  unremarkable.      Impression    IMPRESSION:  1. No definite evidence of IV contrast extravasation into the lumen of  the duodenum or proximal to mid small bowel. Previously administered  oral contrast precludes evaluation of the colon and probable terminal  ileum. Gastric mucosa enhances normally. No obvious IV contrast  accumulation in the gastric lumen.  2. Evidence of prior embolization procedure around the area of the  pancreatic head and duodenum.  3. Questionable hyperdense cyst measuring less than 1 cm lower pole  left kidney not well appreciated on postcontrast images. Additional  left renal cortical cyst is noted.    ELIJAH ARMENDARIZ MD         35 minutes CC time  respiratory failure  GI bleed , WOLFGANG excluding procedures

## 2020-01-03 NOTE — PLAN OF CARE
VSS. No stools overnight. Hgb stable this AM. LS course posterior. Sputum culture positive for ESBL. Small thick yellow secretions. Tolerated PST 7/5 for 3 hours. UOP adequate.

## 2020-01-03 NOTE — PROGRESS NOTES
[facility] ICU RESPIRATORY NOTE        Date of Admission: 12/20/2019    Date of Intubation (most recent): 12/24/19    Reason for Mechanical Ventilation: AW protection/Cardiac Arrest    Number of Days on Mechanical Ventilation:3    Met Criteria for Spontaneous Breathing Trial: NO    Reason for No Spontaneous Breathing Trial: PerMD    Significant Events Today: None    ABG Results:   Recent Labs   Lab 01/01/20  0505 12/30/19  1120 12/28/19  1101   PH  --  7.34*  --    PCO2  --  24*  --    PO2  --  72*  --    HCO3  --  13*  --    O2PER 30  --  30       Current Vent Settings: Ventilation Mode: CMV/AC  (Continuous Mandatory Ventilation/ Assist Control)  FiO2 (%): 30 %  Rate Set (breaths/minute): 12 breaths/min  Tidal Volume Set (mL): 450 mL  PEEP (cm H2O): 5 cmH2O  Oxygen Concentration (%): 30 %  Resp: 24      Skin Assessment: Intact    Plan: Assess for weaning    Elliot Hernandez RT

## 2020-01-04 NOTE — PLAN OF CARE
Pt alert to voice, following simple commands. Tolerating PS, pt tolerates mode better, tachypnea at times - Lungs coarse throughout. Up in the chair with the lift x 1. IV pain meds received x 1. No BM this shift, lots of gas. Plan for care conference tomorrow evening.  will be needed.  Up in the chair x 1. Will continue to monitor.

## 2020-01-04 NOTE — PROGRESS NOTES
[facility] ICU RESPIRATORY NOTE        Date of Admission: 12/20/2019    Date of Intubation (most recent): 12/24/19    Reason for Mechanical Ventilation: AW protection/cardiac arrest    Number of Days on Mechanical Ventilation: 12    Met Criteria for Spontaneous Breathing Trial: Yes -     Reason for Stopping Spontaneous Breathing Trial: Pt was tachypneic and tacycardiac    Significant Events Today: Pt tried to do PS 5/5 but her RR was too high.     ABG Results:   Venous Blood Gas  Recent Labs   Lab 01/04/20  1109 01/01/20  0505   PHV 7.21* 7.31*   PCO2V 30* 28*   PO2V 45 38   HCO3V 12* 14*   O2PER 30 30       Recent Labs   Lab 01/04/20  1109 01/01/20  0505 12/30/19  1120   PH  --   --  7.34*   PCO2  --   --  24*   PO2  --   --  72*   HCO3  --   --  13*   O2PER 30 30  --        Current Vent Settings: Ventilation Mode: CMV/AC  (Continuous Mandatory Ventilation/ Assist Control)  FiO2 (%): 30 %  Rate Set (breaths/minute): 12 breaths/min  Tidal Volume Set (mL): 450 mL  PEEP (cm H2O): 5 cmH2O  Pressure Support (cm H2O): 5 cmH2O  Oxygen Concentration (%): 30 %  Resp: 22      Skin Assessment: skin intact     Plan: Will continue full ventilatory support for now and assess for weaning readiness daily.               Julieta Bowers, RT

## 2020-01-04 NOTE — PLAN OF CARE
Up to chair for 2 hours.  Failed PS trial 2/2 tachypnea 30s.  HR tachy most of morning 100-140s.  Attempted to decrease Dex but failed due to restless and increased HR. Lungs remain coarse with some crackles that resolve with suctioning.  TPN continues.  IVFs changes for increasing Na.

## 2020-01-04 NOTE — PROGRESS NOTES
Critical Care Progress Note      01/04/2020    Name: Eliza Thurman MRN#: [id number]   Age: 59 year old YOB: 1960     Hospital Day     15             Problem List:   Active Problems:    Hemorrhagic shock (H)    Severe malnutrition (H)  upper GI bleeding, Duodenal ulcer -- recurrent   WOLFGANG  Acute Hypoxemic Respiratory Failure  HCAP            Summary/Hospital Course:     59F [place] speaking with pmh of MS and gastric ulcer disease now presented to OSH with melena and emesis on 12/20.  She was very unstable prior to transfer and apparently cardiac arrested just prior to transport. Got  MTP   ~ 10 units PRBC.  On arrival here she was taken immediately to IR where embolization was performed and hemostasis was apparently achieved.    Was on lazar and doing ok but then had recurrent hypotension and melena on 12/24 so back to ICU and got 4 units PRBC,  DDAVP, PPI drip and needing intubation prior to EGD for acidosis and hypotension and scope showed oozing clot in duodenum in area of expected GDA ulcer.  No intervention done.     12/29 - weaned off pressors AM, transfused 1 uPRBC for Hgb 6.8 though repeat 10   12/30 PS trials started, some ? Melena, secretions from ETT , sputum sent  12/31 - repeat EGD with ulcer but no overt bleeding,   1/1 - abx started for polymicrobial sputum   1/2 - CT ABD pelvis angio ordered eval for source of bleed   1/3- Failed PST 2/2 bleeding/secretions       Interim History:     No major events overnight. More tachycardic this AM. Failed PST this AM 2/2 tachypnea and tachycardia.        Assessment and plan :     Eliza Thurman is a 59 year old female admitted on 12/20/2019 for hemorrhagic shock 2/2 upper GI bleeding     Neurology/Psychiatry:   1. H/o advanced MS--non ambulatory/immobile at baseline   3. Sedation:  precedex reduced this AM with PST but was noticeably more uncomfortable, titrating back up this AM, prn pain medication  4. Encephalopathy - S/p cardiac  arrest--brief seconday to hemorrhagic shock. 12/24  P: follow mental status, avoid sedating medications as possible ; goal RASS 0     Cardiovascular:   1.  Hemorrhagic shock: weaned off lo-dose norepinephrine 12/29 seems ,though labile pressors though exam seems well perfused   TTE 12/29 ef 65% mod AR  2. Tachycardia: 140-150 this AM but sinus, seemed to coincide with reducing sedation. With increase in precedex, HR back down to 100-110.   -P follow hemodynamics (this AM has been stable), goal MAP >65 , prn volume challenge    Pulmonary/Ventilator Management:   1. Acute respiratory failure 2/2 shock - Intubated 12/24 for shock.   - PS trials started 12.29 variable but improving , however unstable GI status and mentation precluding extubation. Failed this AM 2/2 to tachypnea.   2. HCA bronchitis /PNA - secretions with polymicrobial cultures, ESBL   P:   -continue vent support, had been tolerating PS as mode of ventilation with adequate resp drive but failed this AM, will  attempt again this PM   - pulmonary toilet   - Continue meropenem (day 2)  - optimize fluid status, fluid challenge as needed     GI and Nutrition:   1. Recurrent GDA ulcer bleeding with hemorrhagic shock and CODE - required embolization by IR x 2 required MTP   - s/p 4 units PRBC+ 12/24  with peak Hgb 13 now 7.4 > 6.8  - 1 unit(s) PRBC ordered 12/30   - EGD 12/31 noted ulcers but no active bleeding, though continue intermittent dark stools, unable to visualize through stenotic duodenum thus imaging limited  - CT ABD -blush in bowel but no obvious extravasation to identify   2. Protein calorie malnutrition, ALb 1. Overall minimal to no nutrition since admission given ongoing GI status   P:   - serial Hgb (Hgb has been stable)  - BID PPI   - TPN initiated     Renal/Fluids/Electrolytes:   1. WOLFGANG with uremia;  BUN and CR stabilized UOP appropriate (600-800/day)  - lasix held since 12/29   - at risk given contrast 1/2 (benefit > risk)  2. Worsening  acidosis: pH on VBG 7.21 this AM. PCO2 stable, lactate is normal, may be WOLFGANG driving some of the acidosis. Overbreathing the set rate on the vent (routinely RR >20) so will hold on further increase in set rate on the vent.   3. Hypernatremia: 150 this AM, had been high 140's over the last week  P: aim even fluid status, prn challenge  - electrolyte repletions  -Start D5W 50 mL/hr    Infectious Disease:   1. Strep/Proteus/Serrati and E Coli ESBL in sputum >HCAP/bronchtis   P: - Initially on zosyn then unasyn. Switched tomeropenem given sensitivities on 1/3. Today is day 2.       Hematology/Oncology:   1. Acute on chronic anemia seconday to bleeding - clinically slow ooze given intermittent dark stools, stable Hgb over the last few days  2. Persistent Leukocytosis - 2/2 bronchitis, stable   P:   -Serial CBC, follow coags, goal Hgb >7, or if active bleed    IV/Access:   1. Venous access - PICC    ICU Prophylaxis:   1. DVT: mech  2. Feeding - NPO , TPN   3.  Disposition -ICU  family meeting tonight with Dr. Antonio    40 minutes CC time  respiratory failure  GI bleed , WOLFGANG excluding procedures.     Phillip Marshall MD  Pulmonary & Critical Care Medicine  [facility]   Pager: [phone]         Key Medications:       chlorhexidine  15 mL Mouth/Throat Q12H     insulin aspart  1-6 Units Subcutaneous Q4H     lipids  250 mL Intravenous Q24H     meropenem  500 mg Intravenous Q12H     pantoprazole (PROTONIX) IV  40 mg Intravenous BID       dexmedetomidine 0.7 mcg/kg/hr (01/04/20 1048)     parenteral nutrition - ADULT compounded formula       parenteral nutrition - Clinimix E       sodium chloride 25 mL/hr at 01/04/20 0817               Physical Examination:         Intake/Output Summary (Last 24 hours) at 1/3/2020 0800  Last data filed at 1/3/2020 0600  Gross per 24 hour   Intake 2324.68 ml   Output 643 ml   Net 1681.68 ml         Wt Readings from Last 4 Encounters:   01/04/20 63.6 kg (140 lb 3.4 oz)   12/20/19  41.5 kg (91 lb 7.9 oz)     BP - Mean:  [] 105  Ventilation Mode: CMV/AC  (Continuous Mandatory Ventilation/ Assist Control)  FiO2 (%): 30 %  Rate Set (breaths/minute): 12 breaths/min  Tidal Volume Set (mL): 450 mL  PEEP (cm H2O): 5 cmH2O  Pressure Support (cm H2O): 5 cmH2O  Oxygen Concentration (%): 30 %  Resp: 23    GEN: no distress opens eyes to voice.    HEENT: ETT ok   NECK: supple, no LAD  PULM: clear anteriorly  synchronous with vent, copious secretions, marginal cough  CV/COR: RRR S1S2 no gallop,  No rub, no murmur  ABD: soft nontender, , no mass, hypoactive bowel sounds   EXT:  3+  Edema, warm x4 -contractures and atrophy bilateral   SKIN: no obvious rash  LINES: clean, dry intact         Data:   All data and imaging reviewed     ROUTINE ICU LABS (Last four results)  CMP  Recent Labs   Lab 01/04/20  0425 01/03/20  0424 01/02/20  2145 01/02/20  0430 01/01/20  1325 01/01/20  0440   * 148*  --  148*  --  147*   POTASSIUM 3.4 3.5  --  3.8 4.2 3.2*   CHLORIDE 126* 124*  --  124*  --  122*   CO2 13* 12*  --  13*  --  14*   ANIONGAP 11 12  --  11  --  11   * 136*  --  89  --  84   BUN 71* 79*  --  89*  --  90*   CR 2.76* 2.77*  --  2.90*  --  3.02*   GFRESTIMATED 18* 18*  --  17*  --  16*   GFRESTBLACK 21* 21*  --  20*  --  19*   MORGAN 7.3* 7.3*  --  7.1*  --  7.1*   MAG 2.1 2.3 2.4* 1.4*  --   --    PHOS 4.0 3.8  --  4.2  --   --    PROTTOTAL 4.0* 4.1*  --  3.6*  --  3.5*   ALBUMIN 1.1* 1.3*  --  1.3*  --  1.0*   BILITOTAL 0.3 0.3  --  0.5  --  0.3   ALKPHOS 164* 156*  --  133  --  143   AST 17 20  --  16  --  21   ALT 15 15  --  12  --  12     CBC  Recent Labs   Lab 01/04/20  0425 01/03/20  0424 01/02/20  2145 01/02/20  0430   WBC 15.6* 15.1* 15.2* 10.4   RBC 2.80* 2.84* 2.89* 2.52*   HGB 8.3* 8.5* 8.7* 7.5*   HCT 24.5* 24.8* 25.1* 21.9*   MCV 88 87 87 87   MCH 29.6 29.9 30.1 29.8   MCHC 33.9 34.3 34.7 34.2   RDW 16.1* 15.8* 15.7* 15.6*    184 199 171     INR  Recent Labs   Lab  01/03/20  0424   INR 1.42*     Arterial Blood Gas  Recent Labs   Lab 01/04/20  1109 01/01/20  0505 12/30/19  1120   PH  --   --  7.34*   PCO2  --   --  24*   PO2  --   --  72*   HCO3  --   --  13*   O2PER 30 30  --        All cultures:  Recent Labs   Lab 12/30/19  1245   CULT Heavy growth  Proteus mirabilis  *  Heavy growth  Serratia marcescens  *  Moderate growth  Strain 2  Serratia marcescens  *  Moderate growth  Escherichia coli ESBL  ESBL (extended beta lactamase) producing organisms require contact precautions.  *  Heavy growth  Streptococcus anginosus  *  Light growth  Normal mikhail    Critical Value/Significant Value called to and read back by  Maritza Osborn RN at 2129 on 1.2.20 by SS.       Recent Results (from the past 24 hour(s))   XR Chest Port 1 View    Narrative    XR CHEST PORT 1 VW   1/4/2020 4:00 AM     HISTORY: reps failure day #15 intubation    COMPARISON: 12/2/2020      Impression    IMPRESSION: Endotracheal and transesophageal gastric tubes are in  similar position. Right IJ CVC is in similar position. Stable small  left layering pleural effusion. Development of hazy opacities in the  right lower lobe may represent a small layering pleural effusion  and/or superimposed infectious/inflammatory pneumonitis. Interval  increase in consolidation hazy opacities in the left lung base  concerning for progression of multifocal infectious/inflammatory  pneumonitis with aspiration not excluded. There is a right lateral  longitudinally oriented thin density is consistent with a skinfold  artifact.    ASIM TELLEZ MD

## 2020-01-04 NOTE — PROGRESS NOTES
NEURO: Sedated. Patient needs Prydeinig  for communication. Bed works for basic questions.  Does not follow with sedation vacation. Oriented to self only.    RESPIRATORY: Intubated. Did very well on CPAP/PS 5/5 until 22:00; then switched over to CMV for noc.    CARDIOVASCULAR: Sinus Tachy at times. Normotensive. Severe dependent edema including labia.    GASTROINTESTINAL: No indication of bleeding noted.  Continue TPN and lipids.    GENITOURINARY: Urinary catheter in place.    SKIN: Open lesions on lips. Protective mepilex in place on coccyx.    PLAN OF CARE: Continue CPAP/ PS wean as tolerated. Care conference with Dr. Antonio 21:00-22:00. Prydeinig  has been requested for 21:00.

## 2020-01-04 NOTE — PROGRESS NOTES
RT was called to bedside to switch out ETT bustillo. When the ETT bustillo was removed it was noted the the pt's cheeks were reddened. A half of mepilex was noted to be on each cheek under the tube bustillo. Cavilon skin barrier and a whole mepilex border was placed on each cheek. It was also noted that the pt has excoriation on the lips per bedside nurse. MD aware. Wound care consult placed.  Will cont to monitor closely.  1/4/2020  Teri Stanley, RT

## 2020-01-04 NOTE — PHARMACY-PHARMACOTHERAPY NOTE
TPN formula evaluated based on today s labs results.    The following changes have been made:     Sodium:  decreased  to 40 mEq/day (from 54.6)  .       Pharmacy will continue to follow and adjust as appropriate.

## 2020-01-04 NOTE — PROVIDER NOTIFICATION
Notified Dr Marshall of VBG result and current vitals and vent settings.  (RT unable to obtain ordered ABG).  Orders for lactate.

## 2020-01-05 NOTE — PROGRESS NOTES
Family meeting held today to update family over clinical status and requirement to discuss further necessary management decision as patient unable/incompetent to participate.  Patient interviewed with interpretor, follow simple commands but unclear if understanding current conversation.      Individuals present included son Aidan and nephew Ryder (sp?) , Nurse Pierre.     We reviewed initial presentation briefly and hospital course thus far with ongoing evidence of slow bleed, continued respiratory failure and severe malnutrition and that the result of her accumulated organ injury her outcome is poor, despite further aggressive measures.    Both Aidan and Ryder expressed Eliza's prior reluctance for medical care and wanted to treat her MS and medical issues naturally. They both mentioned that she had in the past iterated to them of not wanting to go to the hospital and be able to just be at home despite their wishes for her to be more aggressive with her cares.      We discussed that current attempts to wean ventilator had been not as successful as we had hoped and next step would be to move to tracheostomy however given her prior wishes that this not aligned with her or their goals of care.  They both expressed that they do not want her to suffer and feel that at that she is in her present condition. We agreed to move towards extubation tonight with transition to comfort measures if she has difficulty breathing.   DNR/DNI order placed.     Fritz Antonio MD

## 2020-01-05 NOTE — PROGRESS NOTES
ICU Update    Patient extubated to nasal cannula ~ 12AM, tolerating thus far, appears comfortable. Continue to re-eval throughout early AM    Fritz Antonio MD      Patient appears to be sleeping comfortably. Full comfort measures (withdrawal of feeding, labs) not yet implemented as initially seems to be tolerating off vent. Will adjust sedation follow resp status closely and update son.     Fritz Antonio MD

## 2020-01-05 NOTE — PHARMACY-PHARMACOTHERAPY NOTE
TPN formula evaluated based on today s labs results.    The following changes have been made:  Potassium: increased to 52 mEq/day .  Chloride:Acetate ratio: changed to max acetate      Pharmacy will continue to follow and adjust as appropriate.

## 2020-01-05 NOTE — PROGRESS NOTES
Critical Care Progress Note      01/05/2020    Name: Eliza Thurman MRN#: [id number]   Age: 59 year old YOB: 1960     Hospital Day     16             Problem List:   Active Problems:    Hemorrhagic shock (H)    Severe malnutrition (H)  upper GI bleeding, Duodenal ulcer -- recurrent   WOLFGANG  Acute Hypoxemic Respiratory Failure- resolved   HCAP            Summary/Hospital Course:     59F [place] speaking with pmh of MS and gastric ulcer disease now presented to OSH with melena and emesis on 12/20.  She was very unstable prior to transfer and apparently cardiac arrested just prior to transport. Got  MTP   ~ 10 units PRBC.  On arrival here she was taken immediately to IR where embolization was performed and hemostasis was apparently achieved.    Was on lazar and doing ok but then had recurrent hypotension and melena on 12/24 so back to ICU and got 4 units PRBC,  DDAVP, PPI drip and needing intubation prior to EGD for acidosis and hypotension and scope showed oozing clot in duodenum in area of expected GDA ulcer.  No intervention done.     12/29 - weaned off pressors AM, transfused 1 uPRBC for Hgb 6.8 though repeat 10   12/30 PS trials started, some ? Melena, secretions from ETT , sputum sent  12/31 - repeat EGD with ulcer but no overt bleeding,   1/1 - abx started for polymicrobial sputum   1/2 - CT ABD pelvis angio ordered eval for source of bleed   1/3- Failed PST 2/2 bleeding/secretions   1/4- extubated after family conference       Interim History:     Family conference last night (see Dr. Antonio's note). Extubated and made DNR/DNI. Not full comfort cares at this time.       Assessment and plan :     Eliza Thurman is a 59 year old female admitted on 12/20/2019 for hemorrhagic shock 2/2 upper GI bleeding     Neurology/Psychiatry:   1. H/o advanced MS--non ambulatory/immobile at baseline   3. Analgesia/Sedation: on precedex and prn pain meds   4. Encephalopathy - S/p cardiac arrest--brief seconday  to hemorrhagic shock. 12/24  P: follow mental status, avoid sedating medications as possible ; goal RASS 0     Cardiovascular:   1.  Hemorrhagic shock: weaned off lo-dose norepinephrine 12/29 seems ,though labile pressors though exam seems well perfused   TTE 12/29 ef 65% mod AR  2. Tachycardia: improved this AM.   -P follow hemodynamics (this AM has been stable), goal MAP >65 , prn volume challenge    Pulmonary/Ventilator Management:   1. Acute respiratory failure 2/2 shock - Intubated 12/24 for shock.   - Extubated last night and remains on supplemental O2. Now is DNI.   2. HCA bronchitis /PNA - secretions with polymicrobial cultures, ESBL   P:   -Continue O2 as needed, patient is DNI  - pulmonary toilet   - Continue meropenem (day 3)  - optimize fluid status, fluid challenge as needed     GI and Nutrition:   1. Recurrent GDA ulcer bleeding with hemorrhagic shock and CODE - required embolization by IR x 2 required MTP   - s/p 4 units PRBC+ 12/24  with peak Hgb 13 now 7.4 > 6.8  - 1 unit(s) PRBC ordered 12/30   - EGD 12/31 noted ulcers but no active bleeding, though continue intermittent dark stools, unable to visualize through stenotic duodenum thus imaging limited  - CT ABD -blush in bowel but no obvious extravasation to identify   2. Protein calorie malnutrition, ALb 1. Overall minimal to no nutrition since admission given ongoing GI status   P:   - serial Hgb (Hgb has been stable)  - BID PPI   - TPN initiated     Renal/Fluids/Electrolytes:   1. WOLFGANG with uremia;  BUN and CR stabilized UOP appropriate (600-800/day)  - lasix held since 12/29   - at risk given contrast 1/2 (benefit > risk)  2. Worsening acidosis: no longer following given extubation  3. Hypernatremia: 147 this AM, improved   P: aim even fluid status, prn challenge  - electrolyte repletions  -Start D5W 50 mL/hr    Infectious Disease:   1. Strep/Proteus/Serrati and E Coli ESBL in sputum >HCAP/bronchtis   2. Oral lesion: questions HSV or pressure  ulcer  P: - Initially on zosyn then unasyn. Switched tomeropenem given sensitivities on 1/3. Today is day 3        -f/u HSV PCR    Hematology/Oncology:   1. Acute on chronic anemia seconday to bleeding - clinically slow ooze given intermittent dark stools, stable Hgb over the last few days  2. Persistent Leukocytosis - 2/2 bronchitis, stable   P:   -Serial CBC, follow coags, goal Hgb >7, or if active bleed    IV/Access:   1. Venous access - PICC    ICU Prophylaxis:   1. DVT: mech  2. Feeding - NPO , TPN   3.  Disposition - ICU for today, potential transfer to medicine tomorrow     Family conference done last night and patient made DNR/DNI following compassionate extubation. Not currently full comfort cares (still getting nutrition and labs). To be readdressed based on clinical status moving forward.     Total time: 35 minutes     Phillip Marshall MD  Pulmonary & Critical Care Medicine  [facility]   Pager: [phone]         Key Medications:       insulin aspart  1-6 Units Subcutaneous Q4H     lipids  250 mL Intravenous Q24H     meropenem  500 mg Intravenous Q12H     pantoprazole (PROTONIX) IV  40 mg Intravenous BID       dexmedetomidine 0.5 mcg/kg/hr (01/05/20 0615)     D5W 50 mL/hr at 01/04/20 1917     parenteral nutrition - ADULT compounded formula       parenteral nutrition - ADULT compounded formula 65 mL/hr at 01/04/20 2022               Physical Examination:         Intake/Output Summary (Last 24 hours) at 1/3/2020 0800  Last data filed at 1/3/2020 0600  Gross per 24 hour   Intake 2324.68 ml   Output 643 ml   Net 1681.68 ml         Wt Readings from Last 4 Encounters:   01/05/20 63.6 kg (140 lb 3.4 oz)   12/20/19 41.5 kg (91 lb 7.9 oz)     BP - Mean:  [] 108  Ventilation Mode: CPAP/PS  (Continuous positive airway pressure with Pressure Support)  FiO2 (%): 30 %  Rate Set (breaths/minute): 12 breaths/min  Tidal Volume Set (mL): 450 mL  PEEP (cm H2O): 5 cmH2O  Pressure Support (cm H2O): 5  cmH2O  Oxygen Concentration (%): 30 %  Resp: 18    GEN: no distress opens eyes to voice.    HEENT: ETT ok   NECK: supple, no LAD  PULM: clear anteriorly  synchronous with vent, copious secretions, marginal cough  CV/COR: RRR S1S2 no gallop,  No rub, no murmur  ABD: soft nontender, , no mass, hypoactive bowel sounds   EXT:  3+  Edema, warm x4 -contractures and atrophy bilateral   SKIN: no obvious rash  LINES: clean, dry intact         Data:   All data and imaging reviewed     ROUTINE ICU LABS (Last four results)  CMP  Recent Labs   Lab 01/05/20  0525 01/04/20  0425 01/03/20  0424 01/02/20  2145 01/02/20  0430  01/01/20  0440   * 150* 148*  --  148*  --  147*   POTASSIUM 3.3* 3.4 3.5  --  3.8   < > 3.2*   CHLORIDE 123* 126* 124*  --  124*  --  122*   CO2 12* 13* 12*  --  13*  --  14*   ANIONGAP 12 11 12  --  11  --  11   * 133* 136*  --  89  --  84   BUN 71* 71* 79*  --  89*  --  90*   CR 2.60* 2.76* 2.77*  --  2.90*  --  3.02*   GFRESTIMATED 19* 18* 18*  --  17*  --  16*   GFRESTBLACK 22* 21* 21*  --  20*  --  19*   MORGAN 7.2* 7.3* 7.3*  --  7.1*  --  7.1*   MAG 2.1 2.1 2.3 2.4* 1.4*   < >  --    PHOS 4.2 4.0 3.8  --  4.2  --   --    PROTTOTAL  --  4.0* 4.1*  --  3.6*  --  3.5*   ALBUMIN  --  1.1* 1.3*  --  1.3*  --  1.0*   BILITOTAL  --  0.3 0.3  --  0.5  --  0.3   ALKPHOS  --  164* 156*  --  133  --  143   AST  --  17 20  --  16  --  21   ALT  --  15 15  --  12  --  12    < > = values in this interval not displayed.     CBC  Recent Labs   Lab 01/04/20  0425 01/03/20  0424 01/02/20  2145 01/02/20  0430   WBC 15.6* 15.1* 15.2* 10.4   RBC 2.80* 2.84* 2.89* 2.52*   HGB 8.3* 8.5* 8.7* 7.5*   HCT 24.5* 24.8* 25.1* 21.9*   MCV 88 87 87 87   MCH 29.6 29.9 30.1 29.8   MCHC 33.9 34.3 34.7 34.2   RDW 16.1* 15.8* 15.7* 15.6*    184 199 171     INR  Recent Labs   Lab 01/03/20  0424   INR 1.42*     Arterial Blood Gas  Recent Labs   Lab 01/04/20  1109 01/01/20  0505 12/30/19  1120   PH  --   --  7.34*   PCO2  --    --  24*   PO2  --   --  72*   HCO3  --   --  13*   O2PER 30 30  --        All cultures:  Recent Labs   Lab 12/30/19  1245   CULT Heavy growth  Proteus mirabilis  *  Heavy growth  Serratia marcescens  *  Moderate growth  Strain 2  Serratia marcescens  *  Moderate growth  Escherichia coli ESBL  ESBL (extended beta lactamase) producing organisms require contact precautions.  *  Heavy growth  Streptococcus anginosus  *  Light growth  Normal mikhail    Critical Value/Significant Value called to and read back by  Maritza Osborn RN at 2129 on 1.2.20 by .       No results found for this or any previous visit (from the past 24 hour(s)).

## 2020-01-05 NOTE — PROGRESS NOTES
NEURO: Currently oriented to self only.    RESPIRATORY: O2 sat 95-98% with NC @ 2 lpm.    CARDIOVASCULAR: SR and normotensive.    GASTROINTESTINAL: No evidence of active GI bleed.    GENITOURINARY: Urinary catheter in place.    SKIN: Open lesions on lips. Protective mepilex in place on coccyx.    PLAN OF CARE:  Patient is breathing well on her own. She is now DNR/DNI. Wean sedation as tolerated.

## 2020-01-06 NOTE — PROGRESS NOTES
SPIRITUAL HEALTH SERVICES Progress Note  [facility]     Attempted to visit pt or families per LOS. Checked in with pt's nurses both on Saturday and Monday (today). Pt and families need , also talked with the  today. There was a family meeting on 1/4 at evening time, but  was not able to attend the meeting. Don't know if family is coming today. Pt is comfort care now. SH[facility] will remain available for any future needs.       Zabrina Alexis  Chaplain Resident

## 2020-01-06 NOTE — PROGRESS NOTES
BRIEF NUTRITION NOTE:    Monitoring for nutrition plan of care.  TPN has now been discontinued as plan to transition to hospice care.  Will be available only prn.    Rupinder Hammond, RD, LD, CNSC

## 2020-01-06 NOTE — CONSULTS
Care Transition Initial Assessment - SW     Met with: Family including son,Lashell and nephew, Ryder.   Active Problems:    Hemorrhagic shock (H)    Severe malnutrition (H)       DATA  Lives With: child(zhang), adult   Living Arrangements: house     Description of Support System: Supportive, Involved  Who is your support system?: Children. Pt has been living with her son and d-I-l in their [place] home    Identified issues/concerns regarding health management: Pt admitted to [facility] on 12/20/19 from the [facility] ED. Pt has been living with son and EMS called to home as pt was unstable and had signs of cardiac arrest prior to admit to [facility].     Pt is originally from [place] but is originally from  [place]. [place] to [place] ago and has been staying with her son. Family states that she has been reluctant to see physician and they had hoped she would be more likely to seek medical care once she came to [place] but pt has remained reluctant to see physicians. Pt has declined medical care for her MS and has chosen not to utilize steroids to treat MS.  Pt did not even want to come to the hospital per her family. Pt was extubated and family is not able to care for her at home and wants to seek hospice placement on behalf of pt.Pt is on MNSure.  First choice would be [facility] . VERONICA contacted Rupinder at [facility] and they have no openings and do not anticipate an opening until end of week.  SW contacted [facility] and they do not have current opening but requested referral.  VERONICA contacted Ingrid at [facility] regarding hospice bed and they do not have openings and none anticipated until later in the week.  Referrals sent to [facility], [facility], [facility] and [facility] via [facility] to secure hospice bed.   VERONICA received call from Lisa at [facility] that they do not have openings.  VERONICA discussed with son and nephew the differences in cost and coverage between TCU,LTC and hospice. VERONICA discussed ranges  of daily costs at hospice facilies and that [facility] is the only facility that does not charge a daily rate for care. VERONICA discussed that medicare would pay for the hospice services only.  VERONICA called  Ryder and provided update on voice message. It is requested that Ryder be contacted first as he speaks better english and assists pt and son, Lashell with communication.     ASSESSMENT  Cognitive Status:  awake and alert. Pt speaks little english.  Concerns to be addressed: Hospice placement. Pt is unable to do home hospice. Pt is on [place] [facility].     PLAN  Financial costs for the patient includes: TBD  Patient given options and choices for discharge yes  Patient anticipates discharging to:  TBD.    VELMA Chung  [facility]  [phone]

## 2020-01-06 NOTE — PROGRESS NOTES
[facility] WO Nurse Inpatient Wound Assessment   Initial Assessment of wound(s) on pt's:   Lips, mouth/ mucosal tissue    WOC NURSE ASSESSMENT    Thick peeling dried, black, bloody scabs on lips that are lifting to reveal healed or moist red mucosal tissue.  Inside the mouth lining there are many areas of moist white mucosal tissue with some open, raw, slightly bloody areas.  Am not sure the entire cause of the irritation/ lesions to her lips and mucosal tissue but would recommend using a humidified mask, brushing her teeth (not just swabs), using rinses and vaseline to her lips.  Pt speaks Beninese, was not able to, perhaps a little English, so was not able to determine how uncomfortable her mouth is.  She seemed to indicate to me that she was in pain.  I discussed with the nurse who will follow up  [facility] NURSE TREATMENT PLAN    Plan of care for oral cares- every 3-4 hours and prn  1. Gently brush teeth,  really good at least 2x/ day  2. Provide oral rinses several times a day, please discuss with MD- ok for salt water rinse or an antimicrobial vs pain medication  3. Liberally apply Vaseline to lips  - recommend humidified mask    Nursing to notify Provider(s) and re-consult the [facility] Nurse if wound(s) deteriorate or new skin concerns    [facility] Nurse follow-up plan: signing off  OBJECTIVE DATA    Patient history according to provider notes: 59F Beninese speaking with pmh of MS and gastric ulcer disease now presented to OSH with melena and emesis on 12/20.  She was very unstable prior to transfer and apparently cardiac arrested just prior to transport. Got  MTP   ~ 10 units PRBC.  On arrival here she was taken immediately to IR where embolization was performed and hemostasis was apparently achieved.     Was on lazar and doing ok but then had recurrent hypotension and melena on 12/24 so back to ICU and got 4 units PRBC,  DDAVP, PPI drip and needing intubation prior to EGD for acidosis and hypotension and scope showed oozing clot  in duodenum in area of expected GDA ulcer.  No intervention done.      12/29 - weaned off pressors AM, transfused 1 uPRBC for Hgb 6.8 though repeat 10   12/30 PS trials started, some ? Melena, secretions from ETT , sputum sent  12/31 - repeat EGD with ulcer but no overt bleeding,   1/1 - abx started for polymicrobial sputum   1/2 - CT ABD pelvis angio ordered eval for source of bleed   1/3- Failed PST 2/2 bleeding/secretions   1/4 -1/5 - family meeting made DNR/DNI ; extubated over weekend, tolerating off vent thus far     Andrea Risk Assessment  Sensory Perception: 1-->completely limited    Moisture: 3-->occasionally moist   Activity: 1-->bedfast     Mobility: 1-->completely immobile   Nutrition: 1-->very poor   Friction and Shear: 2-->potential problem      Positioning: Pillows,     Mattress:  Standard , Low air loss mattress with rotation    Current diet  None      Moisture Management:  Urinary Catheter  o Catheter secured? Yes     o I/O last 3 completed shifts:  o In: 2769.08 [I.V.:943.23]  o Out: 675 [Urine:675]    Labs:   Recent Labs   Lab Test 01/06/20  0418 01/04/20  0425  12/20/19  1400   ALBUMIN 1.0* 1.1*   < >  --    HGB  --  8.3*   < > 10.4*   INR 1.18*  --    < >  --    WBC  --  15.6*   < > 21.4*   A1C  --   --   --  5.3    < > = values in this interval not displayed.         [facility] NURSE PHYSICAL EXAM    Wound Assessment (location):   Lips and mouth  Wound History:  unsure  thick peeling dried, black, bloody scabs on lips that are lifting to reveal healed or moist red mucosal tissue.  Inside the mouth lining there are many areas of moist white mucosal tissue with some open, raw, slightly bloody areas.    WOC NURSE INTERVENTIONS    Assessed lips and mouth  Wound Care: brushed her teeth a couple of times, swabbed mouth and applied Vaseline to lips  Supplies: reviewed, discussed with RN and discussed with patient  Discussed plan of care with Patient and Nurse  Education provided: Ksenia LOPEZ  INO Serna

## 2020-01-06 NOTE — PLAN OF CARE
Pt speaking in Andorran garbled at start of shift, opens eyes and moans/grimaces with touch/repositioning.  HR into 140's if touched/moved.  Increase in amount of dilaudid available Q1hr new order.  Premedicate prior to repositioning.  Bloody oral sec's.  Low urine output.  Son updated at start of shift.  DNR/DNI not comfort care.  Replaced Magnesium this a.m.  TPN/Lipids.

## 2020-01-06 NOTE — PROVIDER NOTIFICATION
Patient having increased respiratory and HR sustained into the 140-150 range especially with micro turns or any physical contact.  Pt has been medicated with 0.2 mg PRN Dilaudid this evening around every hour.  New order to change dilaudid from 0.2 mg to 0.3 - 0.5 mg every 1 hour PRN I.V.

## 2020-01-06 NOTE — PROGRESS NOTES
Critical Care Progress Note      01/06/2020    Name: Eliza Thurman MRN#: [id number]   Age: 59 year old YOB: 1960     Hospital Day     17             Problem List:   Active Problems:    Hemorrhagic shock (H)    Severe malnutrition (H)  upper GI bleeding, Duodenal ulcer -- recurrent   WOLFGANG  Acute Hypoxemic Respiratory Failure  HCAP            Summary/Hospital Course:     59F Czech speaking with pmh of MS and gastric ulcer disease now presented to OSH with melena and emesis on 12/20.  She was very unstable prior to transfer and apparently cardiac arrested just prior to transport. Got  MTP   ~ 10 units PRBC.  On arrival here she was taken immediately to IR where embolization was performed and hemostasis was apparently achieved.    Was on lazar and doing ok but then had recurrent hypotension and melena on 12/24 so back to ICU and got 4 units PRBC,  DDAVP, PPI drip and needing intubation prior to EGD for acidosis and hypotension and scope showed oozing clot in duodenum in area of expected GDA ulcer.  No intervention done.     12/29 - weaned off pressors AM, transfused 1 uPRBC for Hgb 6.8 though repeat 10   12/30 PS trials started, some ? Melena, secretions from ETT , sputum sent  12/31 - repeat EGD with ulcer but no overt bleeding,   1/1 - abx started for polymicrobial sputum   1/2 - CT ABD pelvis angio ordered eval for source of bleed   1/3- Failed PST 2/2 bleeding/secretions   1/4 -1/5 - family meeting made DNR/DNI ; extubated over weekend, tolerating off vent thus far       Interim History:     No major events - some bloody secretions (mouth sores).         Assessment and plan :     Eliza Thurman is a 59 year old female admitted on 12/20/2019 for hemorrhagic shock 2/2 upper GI bleeding     Neurology/Psychiatry:   1. H/o advanced MS--non ambulatory/immobile at baseline   3. Sedation:  precedex reduced this AM with PST but was noticeably more uncomfortable thus continued  4. Encephalopathy - S/p  cardiac arrest--brief seconday to hemorrhagic shock.   P: follow mental status, avoid sedating medications as possible ; goal RASS 0   - wean off precedex  - hospice//social workconsult - assist with transition to hospice given goals of care wishes  - adjust orders to continue symptomatic cares and discontinue others    Cardiovascular:   1.  Hemorrhagic shock: weaned off lo-dose norepinephrine  seems ,though labile pressors though exam seems well perfused   TTE  ef 65% mod AR  2. Tachycardia: 120-150 this AM but sinus, coincides with reducing sedation.   -P -follow hemodynamics (this AM has been stable), goal MAP >65   - symptomatic management  - PRN metoprolol IV for HR >140    Pulmonary/Ventilator Management:   1. Acute respiratory failure 2/2 shock - Intubated  for shock. Improved   - extubated  -  Stable managing secretions and ventilation currently   2. HCA bronchitis /PNA - secretions with polymicrobial cultures, ESBL  (day )  P:   -continue supplemental O2   - pulmonary toilet   - continue meropenem (day ) - ideally complete course before discharge    GI and Nutrition:   1. Recurrent GDA ulcer bleeding with hemorrhagic shock and CODE - required embolization by IR x 2 required MTP   - s/p 4 units PRBC+   with peak Hgb 13 now 7.4 > 6.8, last unit  1 unit(s) PRBC ordered ;    - EGD  noted ulcers but no active bleeding, though continue intermittent dark stools, unable to visualize through stenotic duodenum thus imaging limited  - CT ABD 1/3 -blush in bowel but no obvious extravasation to identify   2. Protein calorie malnutrition, ALb 1. Overall minimal to no nutrition since admission given ongoing GI status , tpn started   P: stable)  - BID PPI ,  - trial sucralfate   - TPN initiated , will let    -     Renal/Fluids/Electrolytes:   1. WOLFGANG with uremia;  BUN and CR stabilized UOP appropriate (600-800/day)  - maintain hemodynamics  - avoid nephrotoxins    2.  Hypernatremia: 150 this AM, had been high 140's over the last week  P: aim even fluid status, prn challenge  - electrolyte repletions  - D5W 50 mL/hr  - start free water 60 q4     Infectious Disease:   1. Strep/Proteus/Serrati and E Coli ESBL in sputum >HCAP/bronchtis   P: - continue merropenem 5/7       Hematology/Oncology:   1. Acute on chronic anemia seconday to bleeding - clinically slow ooze given intermittent dark stools, stable Hgb over the last few days  2. Persistent Leukocytosis - 2/2 bronchitis, stable   P:   -Serial CBC, follow coags, goal Hgb >7, or if active bleed    IV/Access:   1. Venous access - PICC    ICU Prophylaxis:   1. DVT: mech  2. Feeding - NPO , TPN   3. DISPO - discussion regarding home hospice .consult hospitalist floor transition    L3           Key Medications:       insulin aspart  1-6 Units Subcutaneous Q4H     lipids  250 mL Intravenous Q24H     meropenem  500 mg Intravenous Q12H     pantoprazole (PROTONIX) IV  40 mg Intravenous BID       dexmedetomidine Stopped (01/06/20 1029)     parenteral nutrition - ADULT compounded formula 65 mL/hr at 01/05/20 2010               Physical Examination:           Intake/Output Summary (Last 24 hours) at 1/6/2020 1139  Last data filed at 1/6/2020 0800  Gross per 24 hour   Intake 2301.4 ml   Output 580 ml   Net 1721.4 ml       Wt Readings from Last 4 Encounters:   01/06/20 66.4 kg (146 lb 6.2 oz)   12/20/19 41.5 kg (91 lb 7.9 oz)     BP - Mean:  [] 79  Resp: 25    GEN: no distress opens eyes to voice, mouthing phrase in Comoran.    HEENT: ETT ok   NECK: supple, no LAD  PULM: coarse anteriorly  synchronous with vent, copious secretions in mouth, marginal cough  CV/COR: RRR S1S2 no gallop,  No rub, no murmur  ABD: soft nontender, , no mass, hypoactive bowel sounds   EXT:  3+  Edema, warm x4 -contractures and atrophy bilateral   SKIN: no obvious rash  LINES: clean, dry intact         Data:   All data and imaging reviewed     ROUTINE ICU LABS  (Last four results)  CMP  Recent Labs   Lab 01/06/20  0418 01/05/20  1335 01/05/20  0525 01/04/20  0425 01/03/20 0424 01/02/20  0430   *  --  147* 150* 148*  --  148*   POTASSIUM 4.8 4.2 3.3* 3.4 3.5  --  3.8   CHLORIDE 124*  --  123* 126* 124*  --  124*   CO2 13*  --  12* 13* 12*  --  13*   ANIONGAP 9  --  12 11 12  --  11   *  --  173* 133* 136*  --  89   BUN 74*  --  71* 71* 79*  --  89*   CR 2.59*  --  2.60* 2.76* 2.77*  --  2.90*   GFRESTIMATED 19*  --  19* 18* 18*  --  17*   GFRESTBLACK 22*  --  22* 21* 21*  --  20*   MORGAN 7.3*  --  7.2* 7.3* 7.3*  --  7.1*   MAG 2.0  --  2.1 2.1 2.3   < > 1.4*   PHOS 4.7*  --  4.2 4.0 3.8  --  4.2   PROTTOTAL 4.0*  --   --  4.0* 4.1*  --  3.6*   ALBUMIN 1.0*  --   --  1.1* 1.3*  --  1.3*   BILITOTAL 0.2  --   --  0.3 0.3  --  0.5   ALKPHOS 171*  --   --  164* 156*  --  133   AST 26  --   --  17 20  --  16   ALT 16  --   --  15 15  --  12    < > = values in this interval not displayed.     CBC  Recent Labs   Lab 01/04/20  0425 01/03/20 0424 01/02/20  2145 01/02/20  0430   WBC 15.6* 15.1* 15.2* 10.4   RBC 2.80* 2.84* 2.89* 2.52*   HGB 8.3* 8.5* 8.7* 7.5*   HCT 24.5* 24.8* 25.1* 21.9*   MCV 88 87 87 87   MCH 29.6 29.9 30.1 29.8   MCHC 33.9 34.3 34.7 34.2   RDW 16.1* 15.8* 15.7* 15.6*    184 199 171     INR  Recent Labs   Lab 01/06/20  0418 01/03/20  0424   INR 1.18* 1.42*     Arterial Blood Gas  Recent Labs   Lab 01/04/20  1109 01/01/20  0505 12/30/19  1120   PH  --   --  7.34*   PCO2  --   --  24*   PO2  --   --  72*   HCO3  --   --  13*   O2PER 30 30  --        All cultures:  Recent Labs   Lab 12/30/19  1245   CULT Heavy growth  Proteus mirabilis  *  Heavy growth  Serratia marcescens  *  Moderate growth  Strain 2  Serratia marcescens  *  Moderate growth  Escherichia coli ESBL  ESBL (extended beta lactamase) producing organisms require contact precautions.  *  Heavy growth  Streptococcus anginosus  *  Light growth  Normal mikhail    Critical  Value/Significant Value called to and read back by  Maritza Osborn RN at 8421 on 1.2.20 by SS.       No results found for this or any previous visit (from the past 24 hour(s)).

## 2020-01-06 NOTE — PROGRESS NOTES
[facility]    Medicine Progress Note - Hospitalist Service       Date of Admission:  12/20/2019  Assessment & Plan   Eliza Thurman is a 59 year old Stateless speaking female admitted on 12/20/2019.  Past history of advanced MS (bedbound), untreated per patient's wish who presented to outside ED with hemorrhagic shock due to duodenal ulcers, transferred to [facility] for IR capabilities.  She was briefly seen by hospitalist 12/23 and 12/24 but has remained in ICU throughout her hospitalization.  As noted, she presented with hemorrhagic shock with cardiac arrest (initial hgb 5.0) and underwent IR embolization of GDA.  She was intubated for respiratory failure due to shock and was also noted to have WOLFGANG.  She had recurrent hypotension and melena 12/24 with EGD showing oozing clot in the duodenum in area of expected GDA ulcer.  Family conference was held with son and nephew, and in light of multiple organ dysfunction and patient's prior desires for minimal medical intervention, she was compassionately extubated 1/5/20 and transitioned to comfort care.  She has remained stable on nasal cannula and is transferring out of ICU 1/6/20 with need for placement.    Hemorrhagic shock due to duodenal ulcer  Acute on chronic anemia  Acute hypoxic respiratory failure due to shock and possible HCAP  WOLFGANG  Non-gap metabolic acidosis  Hypernatremia  Acute metabolic encephalopathy  Hx advanced MS  - sputum culture with multiple organisms; per Intensivist, plan was to continue meropenem through 1/8 to complete course to help with secretion management  - continue PPI and carafate to help avoid recurrent bleed/exsanguination  - comfort measures  - social work following for placement         Diet: NPO  DVT Prophylaxis: None as comfort care  Swain Catheter: in place, indication: Strict 1-2 Hour I&O  Code Status: DNR/DNI      Disposition Plan   Expected discharge: 1-2 days, recommended to transitional care unit once safe  disposition plan/ TCU bed available.  Entered: Zaid Kan MD 01/06/2020, 1:14 PM       The patient's care was discussed with the Attending Physician, Dr. Antonio.    Zaid Kan MD  Hospitalist Service  [facility]    ______________________________________________________________________    Interval History   No  present at time of visit.  Discussed with RN, no acute events and patient remains comfortable, pleasantly confused.    Data reviewed today: I reviewed all medications, new labs and imaging results over the last 24 hours. I personally reviewed no images or EKG's today.    Physical Exam   Vital Signs: Temp: 98  F (36.7  C) Temp src: Axillary BP: (!) 110/91 Pulse: 125 Heart Rate: 124 Resp: 29 SpO2: 98 % O2 Device: Nasal cannula Oxygen Delivery: 3 LPM  Weight: 146 lbs 6.17 oz  General Appearance: Thin female in NAD  Respiratory: mild tachypnea, mildly course expiratory lung sounds bilaterally, diminished at bases  Cardiovascular: tachycardic, regular rhythm, normal s1/s2  GI: abdomen soft, no signs of tenderness  Lymph: 2+ edema of all extremities  Other: Awake and alert     Data   Recent Labs   Lab 01/06/20  0418 01/05/20  1335 01/05/20  0525 01/04/20  0425 01/03/20  0424 01/02/20  2145   WBC  --   --   --  15.6* 15.1* 15.2*   HGB  --   --   --  8.3* 8.5* 8.7*   MCV  --   --   --  88 87 87   PLT  --   --   --  174 184 199   INR 1.18*  --   --   --  1.42*  --    *  --  147* 150* 148*  --    POTASSIUM 4.8 4.2 3.3* 3.4 3.5  --    CHLORIDE 124*  --  123* 126* 124*  --    CO2 13*  --  12* 13* 12*  --    BUN 74*  --  71* 71* 79*  --    CR 2.59*  --  2.60* 2.76* 2.77*  --    ANIONGAP 9  --  12 11 12  --    MORGAN 7.3*  --  7.2* 7.3* 7.3*  --    *  --  173* 133* 136*  --    ALBUMIN 1.0*  --   --  1.1* 1.3*  --    PROTTOTAL 4.0*  --   --  4.0* 4.1*  --    BILITOTAL 0.2  --   --  0.3 0.3  --    ALKPHOS 171*  --   --  164* 156*  --    ALT 16  --   --  15 15  --    AST 26  --    --  17 20  --      No results found for this or any previous visit (from the past 24 hour(s)).

## 2020-01-07 NOTE — PLAN OF CARE
DATE & TIME: 1/6/20, 8500 - 7335    Cognitive Concerns/ Orientation : KRYS, Indian speaking, garbled   BEHAVIOR & AGGRESSION TOOL COLOR: Green  CIWA SCORE: N/a   ABNL VS/O2: On comfort care  MOBILITY: Bedrest  PAIN MANAGMENT: Prn Morphine given for grimacing and increased respiration with movements  DIET: NPO  BOWEL/BLADDER: Swain catheter, no BM this shift  ABNL LAB/BG: N/a  DRAIN/DEVICES:  Swain catheter, NGT clamped, right PICC  TELEMETRY RHYTHM: N/a  SKIN: Swelling and redness to nataly area  TESTS/PROCEDURES: N/a  D/C DAY/GOALS/PLACE: Discharge to hospice pending  OTHER IMPORTANT INFO: Limbs contracted. NGT flushed with water q4h

## 2020-01-07 NOTE — PROGRESS NOTES
VERONICA  I: VERONICA spoke with Ryder, patient's nephew, to discuss patient's discharge plan. Patient's family would like to have patient discharge to a facility but is aware that LTC is private pay. Patient's nephew states they do not have funds to private pay and requested SW look into [facility] again. VERONICA informed Ryder that SW will contact [facility] but suggested completing a MA application to get patient MA pending at LT if no bed is available at [facility]. VERONICA emailed Rupinder at [facility] and awaits a response regarding bed availability. VERONICA contacted financial counselor and they are following. Financial Counselor will plan to meet with Ryder and family to update income and send back to [facility] and transfer her from [facility] and MA.  states this process takes a couple days.  will keep SW updated on process.       ADDENDUM  I: SW received an email stating that they do not anticipate a bed until later this week/early next week. They will keep patient's referral and SW will need to follow up closer to end of week if patient is not MA pending by then.    P: SW will continue to follow and assist as needed.    HALI Curtis, LGSW  [phone]  [facility]

## 2020-01-07 NOTE — PROGRESS NOTES
[facility] Progress Note  Chart Reviewed  Pt moved to medical floor from ICU.    Intervention:   FLOYD has received two calls from Ryder requesting updates today regarding hospice placement.  FLOYD was able to review with Ryder that medicare will cover cost of hospice program but that room and board is private pay at any SNF.  Ryder continues to want [facility] application completed and pursue placement there as they cannot manage pt at home and Ryder indicates that pt has no income.  Ryder states that their primary concern is that pt is suffering in hospital and they do not want any unnecessary tests or care done for pt; that she be kept comfortable and pain free.  FLOYD notified via sticky note that family is wanting comfort care only at this time.  Message to contact [facility] with updates given to station FLOYD that is currently following.    Plan: FLOYD continuing for hospice placement.    VELMA Chung  [facility]  [phone]

## 2020-01-07 NOTE — PLAN OF CARE
"DATE & TIME: 01/06/20 4421-7762                          Cognitive Concerns/ Orientation : KRYS, patient is Stateless speaking only, some yes or no questions will answer, inconsistent   BEHAVIOR & AGGRESSION TOOL COLOR: Green  CIWA SCORE: NA  ABNL VS/O2: Bradypnea (28) and tachycardia (124) with O2 saturation at 100 on RA  MOBILITY: Up with A2, lift, reposition as needed  PAIN MANAGMENT: Pain with repositioning, refuses. Denies pain at rest. Given dilaudid 0.3 mg, and morphine 1 mg to keep patient comfortable, absence of nonverbal indicators and RR unchanged.   DIET: NPO (NG tube patent)  BOWEL/BLADDER: Swain catheter, no BM this shift  ABNL LAB/BG: NA  DRAIN/DEVICES: NG feeding tube, Swain catheter, PICC right upper arm (triple lumen)  TELEMETRY RHYTHM: NA  SKIN: Swelling and redness in nataly-area, rash to left abdomen  TESTS/PROCEDURES: Comfort cares  D/C DAY/GOALS/PLACE: Discharge with hospice in next couple days, pending placement  OTHER IMPORTANT INFO: End-stage MS, all limbs contracted. Difficult to understand what she needs, trying to communicate but fairly unsuccessful, speaks very garbled Stateless. States she feels \"ok\" when RN asks yes or no to feeling \"ok\". Denies pain.  "

## 2020-01-07 NOTE — PROGRESS NOTES
[facility]    Medicine Progress Note - Hospitalist Service       Date of Admission:  12/20/2019  Assessment & Plan   Eliza Thurman is a 59 year old Greek speaking female admitted on 12/20/2019.  Past history of advanced MS (bedbound), untreated per patient's wish who presented to outside ED with hemorrhagic shock due to duodenal ulcers, transferred to [facility] for IR capabilities.  She was briefly seen by hospitalist 12/23 and 12/24 but has remained in ICU throughout her hospitalization.  As noted, she presented with hemorrhagic shock with cardiac arrest (initial hgb 5.0) and underwent IR embolization of GDA.  She was intubated for respiratory failure due to shock and was also noted to have WOLFGANG.  She had recurrent hypotension and melena 12/24 with EGD showing oozing clot in the duodenum in area of expected GDA ulcer.  Family conference was held with son and nephew, and in light of multiple organ dysfunction and patient's prior desires for minimal medical intervention, she was compassionately extubated 1/5/20 and transitioned to comfort care.  She has remained stable on nasal cannula and is transferring out of ICU 1/6/20 with need for placement.    Hemorrhagic shock due to duodenal ulcer  Acute on chronic anemia  Acute hypoxic respiratory failure due to shock and possible HCAP  WOLFGANG  Non-gap metabolic acidosis  Hypernatremia  Acute metabolic encephalopathy  Hx advanced MS  - sputum culture with multiple organisms; per Intensivist, plan was to continue meropenem through 1/8 to complete course to help with secretion management  - continue PPI and carafate to help avoid recurrent bleed/exsanguination, present NG tube in place, this is currently use for Carafate, will continue that until discharge and remove it at that time, she is otherwise n.p.o.  - comfort measures  - social work following for placement, awaiting placement into hospice home versus TCU with hospice.  -We will initiate comfort  meds.  Currently she is receiving IV narcotics for pain control.         Diet: NPO  DVT Prophylaxis: None as comfort care  Swain Catheter: in place, indication: Strict 1-2 Hour I&O  Code Status: DNR/DNI      Disposition Plan   Expected discharge: 1-2 days, recommended to hospice home versus TCU with hospice, discussed with social work  Entered: Winifred Nettles MD 01/07/2020, 12:29 PM           Winifred Nettles MD  Hospitalist Service  [facility]    ______________________________________________________________________    Interval History   Operative present near bedside, patient is mumbling, he could not understand what she was saying, she was tachypneic when I visited with her, improved with the Dilaudid, very difficult to obtain symptoms from her due to trouble communicating even with the presence of .    Data reviewed today: I reviewed all medications, new labs and imaging results over the last 24 hours. I personally reviewed no images or EKG's today.    Physical Exam   Vital Signs: Temp: 98.7  F (37.1  C) Temp src: Oral BP: 99/60 Pulse: 115 Heart Rate: 116 Resp: 22 SpO2: 96 % O2 Device: None (Room air) Oxygen Delivery: 3 LPM  Weight: 146 lbs 6.17 oz  General Appearance: Patient awake, tachypneic  Respiratory: mild tachypnea, mildly course expiratory lung sounds bilaterally, diminished at bases  Cardiovascular: tachycardic, regular rhythm, normal s1/s2  GI: abdomen soft, no signs of tenderness  Lymph: 2+ edema of all extremities  Other: Awake and alert     Data   Recent Labs   Lab 01/06/20  0418 01/05/20  1335 01/05/20  0525 01/04/20  0425 01/03/20  0424 01/02/20  2145   WBC  --   --   --  15.6* 15.1* 15.2*   HGB  --   --   --  8.3* 8.5* 8.7*   MCV  --   --   --  88 87 87   PLT  --   --   --  174 184 199   INR 1.18*  --   --   --  1.42*  --    *  --  147* 150* 148*  --    POTASSIUM 4.8 4.2 3.3* 3.4 3.5  --    CHLORIDE 124*  --  123* 126* 124*  --    CO2 13*  --  12* 13* 12*  --     BUN 74*  --  71* 71* 79*  --    CR 2.59*  --  2.60* 2.76* 2.77*  --    ANIONGAP 9  --  12 11 12  --    MORGAN 7.3*  --  7.2* 7.3* 7.3*  --    *  --  173* 133* 136*  --    ALBUMIN 1.0*  --   --  1.1* 1.3*  --    PROTTOTAL 4.0*  --   --  4.0* 4.1*  --    BILITOTAL 0.2  --   --  0.3 0.3  --    ALKPHOS 171*  --   --  164* 156*  --    ALT 16  --   --  15 15  --    AST 26  --   --  17 20  --      No results found for this or any previous visit (from the past 24 hour(s)).

## 2020-01-07 NOTE — PLAN OF CARE
DATE & TIME: 1/7/20, 6785-4069                        Cognitive Concerns/ Orientation : KRYS, Israeli speaking, garbled speech.   BEHAVIOR & AGGRESSION TOOL COLOR: Green  CIWA SCORE: N/a    ABNL VS/O2: comfort measures only  MOBILITY: Bedrest, turn/repo for comfort.   PAIN MANAGMENT: Received prn IV Morphine 1 mg x1 for grimacing and increased respiration with movements  DIET: NPO  BOWEL/BLADDER: Swain catheter, small loose BM x2.   ABNL LAB/BG: N/a  DRAIN/DEVICES:  Swain catheter, NGT clamped, right PICC patent  TELEMETRY RHYTHM: N/a  SKIN: Swelling and redness to nataly area, ordered miconazole powder. +2-3 generalized edema.   TESTS/PROCEDURES: N/a  D/C DAY/GOALS/PLACE: Discharge to hospice, pending placement, SW following.   OTHER IMPORTANT INFO: Limbs contracted. NGT flushed with water q4h. On IV Merrem q12h, last dose tomorrow 1/8/20.

## 2020-01-08 NOTE — PROGRESS NOTES
[facility]    Medicine Progress Note - Hospitalist Service       Date of Admission:  12/20/2019  Assessment & Plan   Eliza Thurman is a 59 year old Tuvaluan speaking female admitted on 12/20/2019.  Past history of advanced MS (bedbound), untreated per patient's wish who presented to outside ED with hemorrhagic shock due to duodenal ulcers, transferred to [facility] for IR capabilities.  She was briefly seen by hospitalist 12/23 and 12/24 but has remained in ICU throughout her hospitalization.  As noted, she presented with hemorrhagic shock with cardiac arrest (initial hgb 5.0) and underwent IR embolization of GDA.  She was intubated for respiratory failure due to shock and was also noted to have WOLFGANG.  She had recurrent hypotension and melena 12/24 with EGD showing oozing clot in the duodenum in area of expected GDA ulcer.  Family conference was held with son and nephew, and in light of multiple organ dysfunction and patient's prior desires for minimal medical intervention, she was compassionately extubated 1/5/20 and transitioned to comfort care.  She has remained stable on nasal cannula and transferred out of ICU 1/6/20 with need for placement.    Hemorrhagic shock due to duodenal ulcer  Acute on chronic anemia  Acute hypoxic respiratory failure due to shock and possible HCAP  WOLFGANG  Non-gap metabolic acidosis  Hypernatremia  Acute metabolic encephalopathy  Hx advanced MS  - Sputum culture with multiple organisms; per Intensivist, plan was to continue meropenem through 1/8 to complete course to help with secretion management, course of meropenem will be complete after this evening's dose.  - Continue PPI and carafate to help avoid recurrent bleed/exsanguination, present NG tube in place, this is currently use for Carafate, will continue that until discharge and remove it at that time, she is otherwise NPO.  - Continue comfort measures. Discussed with nursing, no changes needed to comfort  medications at this time.  - Social work following for placement, awaiting placement into hospice home versus TCU with hospice.       Diet: NPO  DVT Prophylaxis: None, Comfort Cares  Swain Catheter: in place, indication: Strict 1-2 Hour I&O  Code Status: DNR/DNI      Disposition Plan   Expected discharge: soon to hospice home or TCU with hospice when bed available  Entered: Darinel Vasquez MD 01/08/2020, 10:26 AM       The patient's care was discussed with the Bedside Nurse and Patient.    Darinel Vasquez MD  Hospitalist Service  [facility]    ______________________________________________________________________    Interval History   Eliza Thurman was seen this morning with the . She was sleeping initially. She woke up briefly to speak with us. Denied any pain. Did not have any questions or concerns for me. No family present this morning.    Data reviewed today: I reviewed all medications, new labs and imaging results over the last 24 hours. I personally reviewed no images or EKG's today.    Physical Exam   Vital Signs: Temp: 97.4  F (36.3  C) Temp src: Axillary BP: 121/63   Heart Rate: 114 Resp: 22 SpO2: 97 % O2 Device: None (Room air)    Weight: 146 lbs 6.17 oz  Constitutional: drowsy, answered a few questions appropriately, laying in bed, appears comfortable    Data   Medications       meropenem  500 mg Intravenous Q12H     pantoprazole  40 mg Oral BID AC     sodium chloride (PF)  10 mL Intracatheter Q8H     sucralfate  1 g Oral 4x Daily AC & HS

## 2020-01-08 NOTE — PROGRESS NOTES
SW:   D: Sw received call from Jennifer at [facility] ([phone]) who stated that they do not take patients with TPN NG tubes. Further, they have hospice beds in their long term care unit but no beds available. Patient declined at [facility].   P: Will continue to follow.     HALI Melissa, [facility]  [phone]

## 2020-01-08 NOTE — PLAN OF CARE
DATE & TIME: 1/7/20, 3327-5349                 Cognitive Concerns/ Orientation : KRYS, [place] speaking, garbled speech.   BEHAVIOR & AGGRESSION TOOL COLOR: Green  CIWA SCORE: N/a    ABNL VS/O2: comfort measures only  MOBILITY: Bedrest, turn/repo for comfort.   PAIN MANAGMENT: Received po Morphine 5 mg x1 for grimacing and increased respiration with movements  DIET: NPO  BOWEL/BLADDER: Swain catheter, small loose BM x2.   ABNL LAB/BG: N/a  DRAIN/DEVICES:  Swain catheter, NGT clamped, right PICC patent  TELEMETRY RHYTHM: N/a  SKIN: Swelling and redness to nataly area, ordered miconazole powder. +2-3 generalized edema.   TESTS/PROCEDURES: N/a  D/C DAY/GOALS/PLACE: Discharge to hospice, pending placement, SW following.   OTHER IMPORTANT INFO: Limbs contracted. NGT flushed with water q4h. On IV Merrem q12h, last dose tomorrow 1/8/20.  Mouth cares done and applied lip cream .

## 2020-01-08 NOTE — PLAN OF CARE
DATE & TIME: 1/8/20, [id number]                        Cognitive Concerns/ Orientation : KRYS, [place] speaking, garbled speech.   BEHAVIOR & AGGRESSION TOOL COLOR: Green  CIWA SCORE: N/a    ABNL VS/O2: Resp 16-20, labored breathing with accessory muscle use. comfort measures only  MOBILITY: Bedrest, turn/repo for comfort.   PAIN MANAGMENT: no nonverbal indicators of pain, moans with turn/repo, otherwise no prn medications given.   DIET: NPO  BOWEL/BLADDER: Swain catheter, minimal UOP, no BM this shift  ABNL LAB/BG: N/a  DRAIN/DEVICES:  Swain catheter, NGT clamped, right PICC patent  TELEMETRY RHYTHM: NA  SKIN: Swelling and redness to nataly area, applied miconazole powder. +2-3 generalized edema.   TESTS/PROCEDURES: N/a  D/C DAY/GOALS/PLACE: Discharge pending placement, SW following.   OTHER IMPORTANT INFO: Limbs contracted. NGT flushed with water q4h. On IV Merrem q12h

## 2020-01-09 NOTE — DISCHARGE SUMMARY
[facility]    Death Summary  Hospitalist    Date of Admission:  12/20/2019  Date of Death:         1/9/2020  Provider Completing Death Summary: Darinel Vasquez    Discharge Diagnoses   Hemorrhagic shock due to duodenal ulcer  Acute on chronic anemia  Acute hypoxic respiratory failure due to shock and HCAP  Acute kidney injury  Non-gap metabolic acidosis  Hypernatremia  Acute metabolic encephalopathy  Advanced multiple sclerosis    History of Present Illness   Eliza Thurman is a 59 year old Dutch speaking female admitted on 12/20/2019.  Past history of advanced MS (bedbound), untreated per patient's wish who presented to outside ED with hemorrhagic shock due to duodenal ulcers, transferred to [facility] for IR capabilities.    Hospital Course   Eliza Thurman is a 59 year old Dutch speaking female admitted on 12/20/2019.  Past history of advanced MS (bedbound), untreated per patient's wish who presented to outside ED with hemorrhagic shock due to duodenal ulcers, transferred to [facility] for IR capabilities. She was hemodynamically unstable and had a cardiac arrest prior to transport. She was transferred to [facility] and immediately taken to IR for successful coil embolization of the GDA with resolution of the GI bleeding. She was subsequently admitted to the ICU for further evaluation and management. She developed WOLFGANG secondary to the hypotension. Initially hemoglobin was 5.0 and she was transfused multiple units of PRBCs. She was briefly seen by hospitalist 12/23 and 12/24 but returned to the ICU on 12/24/19 due to recurrent bleeding and hypotension. She subsequently developed HCAP and was treated with antibiotics. She was able to be weaned off vasopressors, but there was difficulty weaning her off the vent. A family conference was held on 1/4/20 and the decision was made to transition to comfort cares. She was extubated and transferred out to the general medical unit for  comfort cares. She passed away peacefully on 1/9/20 at 09:43 AM.    Cause of death:  Hemorrhagic shock secondary to bleeding duodenal ulcer    Darinel Vasquez MD    Significant Results and Procedures   IR visceral angiogram with successful coil embolization of the GDA  EGD on 12/24/19 and 12/31/19       Pending Results   Unresulted Labs Ordered in the Past 30 Days of this Admission     Date and Time Order Name Status Description    12/20/2019 0612 Cryoprecipitate prepare order unit In process         Primary Care Physician   Physician No Ref-Primary    Consultations This Hospital Stay   SURGERY GENERAL IP CONSULT  GASTROENTEROLOGY IP CONSULT  PHARMACY IP CONSULT  SOCIAL WORK IP CONSULT  SWALLOW EVAL SPEECH PATH AT BEDSIDE IP CONSULT  PHYSICAL THERAPY ADULT IP CONSULT  OCCUPATIONAL THERAPY ADULT IP CONSULT  SURGERY GENERAL IP CONSULT  PHARMACY/NUTRITION TO START AND MANAGE TPN  VASCULAR ACCESS ADULT IP CONSULT  PHARMACY/NUTRITION TO START AND MANAGE TPN  PHARMACY IP CONSULT  PHARMACY TO DOSE PHYTONADIONE  WOUND OSTOMY CONTINENCE NURSE  IP CONSULT  PALLIATIVE CARE ADULT IP CONSULT  SOCIAL WORK IP CONSULT  SOCIAL WORK IP CONSULT  CARE TRANSITION RN/SW IP CONSULT    Time Spent on this Encounter   I, Darinel Vasquez MD, personally saw the patient today and spent greater than 30 minutes discharging this patient.    Physical Exam: Unresponsive to noxious stimuli, Spontaneous respirations absent, Breath sounds absent and Heart sounds absent    Data   Most Recent 3 CBC's:  Recent Labs   Lab Test 01/04/20  0425 01/03/20  0424 01/02/20  2145   WBC 15.6* 15.1* 15.2*   HGB 8.3* 8.5* 8.7*   MCV 88 87 87    184 199      Most Recent 3 BMP's:  Recent Labs   Lab Test 01/06/20  0418 01/05/20  1335 01/05/20  0525 01/04/20  0425   *  --  147* 150*   POTASSIUM 4.8 4.2 3.3* 3.4   CHLORIDE 124*  --  123* 126*   CO2 13*  --  12* 13*   BUN 74*  --  71* 71*   CR 2.59*  --  2.60* 2.76*   ANIONGAP 9  --  12 11   MORGAN 7.3*  --   7.2* 7.3*   *  --  173* 133*     Most Recent 2 LFT's:  Recent Labs   Lab Test 01/06/20  0418 01/04/20  0425   AST 26 17   ALT 16 15   ALKPHOS 171* 164*   BILITOTAL 0.2 0.3     Most Recent INR's and Anticoagulation Dosing History:  Anticoagulation Dose History     Recent Dosing and Labs Latest Ref Rng & Units 12/21/2019 12/21/2019 12/24/2019 12/25/2019 12/26/2019 1/3/2020 1/6/2020    INR 0.86 - 1.14 1.31(H) 1.34(H) 1.26(H) 1.28(H) 1.36(H) 1.42(H) 1.18(H)        Most Recent 3 Troponin's:  Recent Labs   Lab Test 12/19/19  1909   TROPI 0.043     Most Recent Cholesterol Panel:  Recent Labs   Lab Test 01/06/20  0418   TRIG 167*     Most Recent 6 Bacteria Isolates From Any Culture (See EPIC Reports for Culture Details):  Recent Labs   Lab Test 12/30/19  1245 12/20/19  0100 12/19/19  1908   CULT Heavy growth  Proteus mirabilis  *  Heavy growth  Serratia marcescens  *  Moderate growth  Strain 2  Serratia marcescens  *  Moderate growth  Escherichia coli ESBL  ESBL (extended beta lactamase) producing organisms require contact precautions.  *  Heavy growth  Streptococcus anginosus  *  Light growth  Normal mikhail    Critical Value/Significant Value called to and read back by  Maritza Osborn RN at 2129 on 1.2.20 by SS.   No growth No growth     Most Recent TSH, T4 and A1c Labs:  Recent Labs   Lab Test 12/20/19  1400   A1C 5.3     Results for orders placed or performed during the hospital encounter of 12/20/19   IR Visceral Angiogram    Narrative    INTERVENTIONAL RADIOLOGY VISCERAL ANGIOGRAM 12/20/2019 8:23 AM    PROCEDURE(S):  1. Ultrasound-guided left femoral arteriotomy.  2. Celiac angiography   3. Selective catheterization and angiography of the gastroduodenal  artery.  4. Coil embolization with post embolization angiography of the  gastroduodenal artery.  5. Attempted coil retrieval from right hepatic artery branch vessel.  6. Superior mesenteric artery angiography.  7. Selective catheterization and angiography of  3 SMA branch vessels.  8. Gelfoam and coil embolization of the pancreaticoduodenal SMA branch  vessel post embolization angiography.  9. Placement of a 6 Belizean Angio-Seal closure device.    DATE OF PROCEDURE: 12/20/2019 8:23 AM    MEDICATIONS: 1% Lidocaine SQ, Versed 2 mg IV, Fentanyl 0 mcg IV    CONTRAST: 100 mL Isovue 370 IV    REFERENCED AIR KERMA: 390.05 mGy  FLUOROSCOPY TIME: 33.9 minutes     ESTIMATED BLOOD LOSS: Minimal    COMPLICATIONS: None    CLINICAL HISTORY/INDICATION: 59-year-old female with acute upper GI  bleeding. Patient is on the massive transfusion protocol. Patient  coded at [facility] prior to transfer.    PROCEDURES AND FINDINGS:  Following a discussion of the risks, benefits, indications, and  alternatives to treatment, informed consent was obtained. The patient  was brought to the interventional radiology suite and placed supine on  the table. The left groin prepped and draped in a routine sterile  fashion. A timeout was performed per hospital universal protocol  policy to ensure correct patient, site, and procedure to be performed.       A preliminary ultrasound of the right groin was performed and  demonstrates a patent left common femoral artery.  A permanent  ultrasound image was recorded.  Using a combination of fluoroscopy and  ultrasound, an access site was determined.  The overlying skin was  anesthetized with 1% Lidocaine.  Using ultrasound guidance,  access  into the left common femoral artery was obtained with visualization of  needle entry into the vessel. A 0.018 wire was advanced through the  needle and exchange was made for a 5 Belizean micropuncture sheath. A  0.035 wire was advanced through the micropuncture sheath and exchange  was made for a 5 Belizean vascular sheath.    A 5 Belizean Cobra catheter was advanced into the abdominal aorta over  the wire and utilized to select the celiac axis. Digital subtraction  angiography (DSA) was then performed and the images  demonstrate  classic celiac anatomy. There is a significant bleed off of the  gastroduodenal artery. The duodenal artery distal to the bleed is not  visualized. Over the wire a renegade high flow microcatheter and wire  were used to selectively catheterize the gastroduodenal artery.  Unfortunately after multiple attempts the catheter buckled out of the  celiac access. The Cobra catheter was exchanged over the wire for a  Omni select catheter. The Omni select catheter was used to selectively  catheterize the celiac access. Over the wire the renegade high flow  microcatheter and wire were used to selectively catheterize the  gastroduodenal artery. Digital subtraction angiography was performed,  images show access into the gastroduodenal artery. There is a large  active extravasation from the gastroduodenal artery. Coil embolization  was performed. During placement of a second coil the coil backed out  of the gastroduodenal artery and embolized into a branch of the right  hepatic artery. Post coil angiography was performed, images show  embolization of the proximal gastroduodenal artery. The displaced coil  in a branch of the right hepatic artery is nonocclusive. Attempts were  made to snare the coil from the right hepatic artery without success.  Given the critical condition of the patient the decision was made to  leave the coil in place. The right hepatic artery remained patent even  with the coil in place.    The catheter and wire were removed from the celiac access and used to  selectively catheterize the superior mesenteric artery. Digital  subtraction angiography was performed, images show persistent bleeding  at the level of the duodenum from a pancreaticoduodenal branch vessel.  The renegade high flow microcatheter and wire were used to selectively  catheterize the pancreaticoduodenal arcade branch vessel and digital  subtraction angiography was performed, images show persistent active  extravasation. Gelfoam  and coil embolization was performed. Post  imaging shows residual bleeding. No flow is noted within the  pancreaticoduodenal branch vessel. The microcatheter and wire were  pulled back and used to selectively catheterize a second SMA branch  vessel. Digital subtraction angiography was performed, images show  this branch vessel does not supply the duodenum. The microcatheter and  wire were pulled back and used to selectively catheterize a third SMA  branch vessel. Digital subtraction angiography was performed, images  show this branch vessel does not supply the duodenum.    The microcatheter and wire were removed. Final digital subtraction  angiography the superior mesenteric artery was performed, images show  no active extravasation. The catheter was then used to selectively  catheterize the celiac access. Final celiac angiography was performed,  images show the celiac access is patent. No active extravasation is  noted. The branch vessel of the right hepatic artery with the coil in  it remains patent.    All wires and catheters were removed.  After the arteriotomy was  assessed and found to be suitable, hemostasis was achieved with a 6  Persian Angio Seal device.    Throughout the procedure, the patient was monitored by a radiology  nurse for cardiac rhythm, blood pressure and oxygen saturation which  remained stable. Total moderate sedation time was 45 minutes. The  patient tolerated the procedure well and left the interventional  radiology suite in stable condition.      Impression    IMPRESSION:  1. Successful embolization of a duodenal branch vessel coil  embolization of the gastroduodenal artery and Gelfoam and coil  embolization of the pancreaticoduodenal arcade.  2. One of the coils that was deployed in the gastroduodenal artery  backed out of the gastroduodenal artery and embolized into a branch of  the right hepatic artery. Attempts were made to snare this coil  without success. Given the critical nature of  the patient and large  area of active extravasation the decision was made to leave the coil  in place and proceed with embolization of the GI bleed. At the end of  the procedure final images show the branch of the right hepatic artery  remains patent.    IMAN BOWDEN DO   XR Chest Port 1 View    Narrative    CHEST ONE VIEW PORTABLE   12/20/2019 9:00 AM     HISTORY:  ETT placement.    COMPARISON: 12/20/2019 at 3:28 AM.      Impression    IMPRESSION: Tip of the endotracheal tube is 5.4 cm above the sheila. A  nasogastric tube extends below the left hemidiaphragm. There is a  right internal jugular central line that terminates in the mid SVC. No  pneumothorax, pleural effusion, or airspace consolidation.  Defibrillator pads overlie the chest.    AIMEE GOODRICH MD   XR Chest 1 View    Narrative    CHEST ONE VIEW  12/24/2019 11:39 AM     HISTORY:  Right upper chest pain.    COMPARISON: 12/20/2019.      Impression    IMPRESSION: Right neck central line tip at the superior vena cava.  Nasogastric tube and ET tube are absent. Opacity at the left lung base  again identified and could be atelectasis or airspace disease. Stable  cardiac silhouette. Trace bibasilar pleural fluid suggested. No new  acute airspace disease identified.    JO ANN RAMOS MD   XR Abdomen 1 View    Narrative    ABDOMEN ONE VIEW  12/24/2019 11:39 AM     HISTORY: Abdominal pain.    COMPARISON: December 20, 2019       Impression    IMPRESSION: Small amount of stool. Nonspecific bowel gas pattern.  Question small bowel obstruction.    JACI GARRETT MD   XR Chest Port 1 View    Narrative    CHEST PORTABLE ONE VIEW  12/24/2019 5:13 PM     HISTORY: Intubated.    COMPARISON: Chest x-ray on same day earlier      Impression    IMPRESSION: Endotracheal tube tip projects over lower thoracic trachea  approximately 4 cm from the sheila. Right IJ central catheter tip  projects over low SVC. Stable cardiomediastinal silhouette. Small left  and trace left pleural  effusions and associated basilar  atelectasis/consolidation. Linear density along the right mid chest,  likely represent skinfold. Degenerative changes of the bilateral  shoulder joints.    DANII MERCHANT MD   IR Visceral Angiogram    Narrative    IR VISCERAL ANGIOGRAM  12/25/2019 6:12 PM     HISTORY: Patient had a massive gastrointestinal bleed with associated  cardiac arrest on 12/20/2019. She underwent an angiogram with  embolization of the gastroduodenal artery and multiple collaterals  arising from the superior mesenteric artery. Patient stabilized and  had been stable until recently when she had recurrent decline in  hemoglobin. Upper endoscopy shows slow oozing from the duodenal bulb,  but patient not deemed to be a good candidate for endoscopic  treatment. Therefore, patient presents again for repeat angiography in  an attempt to embolize possible residual collaterals which may be  feeding the proximal duodenum at site of oozing noted on endoscopy.  Patient currently has renal insufficiency.    COMPARISON: Angiogram dated 12/20/2019.    FINDINGS: After obtaining informed consent from the patient's son, the  patient was placed in a supine position on the fluoroscopy table. The  right groin was prepped and draped in the usual sterile manner. 1%  lidocaine was injected for local anesthesia. Ultrasound was used to  evaluate and document patency of the right common femoral artery.  Under sterile ultrasound guidance, access into the right common  femoral artery was obtained. An image was saved for documentation. A 6  Panamanian vascular sheath was placed. A SOS catheter was used to select  the superior mesenteric artery. A CO2 angiogram was performed. A Garcia  wire was passed into the superior mesenteric artery. Over the Garcia  wire, a 6 Panamanian Drew sheath was passed into the superior mesenteric  artery. A Cobra catheter followed by a NATHAN 1 catheter were passed into  the superior mesenteric artery. A 2.7 Panamanian  microcatheter was used to  select a branch arising from the superior mesenteric artery which had  previously been seen to provide some flow to the second portion of the  duodenum. Angiography in this artery was performed. No initial active  extravasation was identified. With catheter manipulation more  distally, some active extravasation was noted; however, it was felt  that this most likely was secondary to wire perforation of the artery.  This artery including multiple distal branches were embolized with 0.8  cc [place] 18 liquid embolic agent. Follow-up angiogram did not show any  significant flow within the distal aspects of this vessel.    Next, the SOS catheter was used to select the celiac trunk. Repeat  angiogram was performed. There was satisfactory flow within the  hepatic, splenic and left gastric arteries. There appeared to be a  pancreaticoduodenal branch which supplied the lesser curvature of the  stomach. This appeared to have some branches providing flow to the  proximal duodenum. A 2.7 Kisw[place]ili microcatheter was then used to select  this vessel and it was embolized with 0.6 cc of [place] 18 liquid embolic  agent. Minimal flow was identified distal to this branch.    At this time, it was elected to end the procedure. The right femoral  sheath was removed. The puncture site was closed with a 6 Kiswahili  Angio-Seal.    I determined this patient to be an appropriate candidate for the  planned sedation and procedure and reassessed the patient immediately  prior to sedation and procedure. Moderate intravenous conscious  sedation was supervised by me. The patient was independently monitored  by a registered nurse assigned to the Department of radiology using  automated blood pressure, EKG and pulse oximetry. The patient  tolerated the procedure well. There were no immediate postprocedure  complications. The patient's vital signs were monitored by radiology  nursing staff under my supervision and remained stable  throughout the  study. Radiation dose for this scan was reduced using automated  exposure control, adjustment of the mA and/or kV according to patient  size, or iterative reconstruction technique.    MEDICATIONS: 1 mg Versed, 100 mcg fentanyl, nitroglycerin 1000 units.    Sedation time: 113 minutes.    Fluoroscopy time: 32 minutes.    Radiation dose: 845.78 mGy (Air Kerma).    Contrast: 65 mL Isovue.      Impression    IMPRESSION: Mesenteric angiography did not show evidence for active  extravasation. Small collaterals arising from the celiac trunk and  superior mesenteric artery that appeared to provide some flow to the  region of the proximal duodenum were embolized as above. There was a  small amount of active extravasation from a tiny branch providing flow  to the second portion of the duodenum. This was felt to be secondary  to wire perforation of this vessel. Patient should be monitored for  continued evidence of active bleeding.      ELIZABETH AGUILAR MD   XR Abdomen Port 1 View    Narrative    ABDOMEN ONE VIEW PORTABLE December 28, 2019 9:56 AM     HISTORY: Feeding tube/NG placement.    COMPARISON: None.      Impression    IMPRESSION: The tip of nasogastric tube in the proximal sidehole are  projected over the stomach. Mildly dilated small bowel loops measure  up to 3.7 cm. No definite gas in the colon, suggesting a mechanical  small bowel obstruction. No gross free air on this supine view.  Embolization coils are projected over the abdomen.    AIMEE GOODRICH MD   XR Chest Port 1 View    Narrative    CHEST PORTABLE ONE VIEW December 30, 2019 6:15 AM     HISTORY: Respiratory failure.      Impression    IMPRESSION: Allowing for rotation, the chest is relatively stable in  appearance compared 12/24/2019. There is continued opacification at  the left lung base which may be related to a combination of small  pleural effusion and parenchymal opacity. The right lung appears  grossly clear. Endotracheal tube and  right-sided central catheter  appear unchanged. There is a new nasogastric tube extending into the  stomach, the side port near the gastroesophageal junction.    ABBI PALOMINO MD   XR Chest Port 1 View    Narrative    CHEST ONE VIEW SEMI-UPRIGHT 12/31/2019 4:00 PM     HISTORY: ETT migrated post EGD, need evaluate position.    COMPARISON: 12/30/2019      Impression    IMPRESSION: Endotracheal tube tip 3 cm from the sheila. Right IJ line  noted, no pneumothorax. Probable small left effusion and compressive  atelectasis and/or infiltrate.    JACI GARRETT MD   XR Chest Port 1 View    Narrative    CHEST PORTABLE ONE VIEW   1/1/2020 6:25 AM     HISTORY: Intubated.    COMPARISON: 12/31/2019.      Impression    IMPRESSION: Stable ET tube above the sheila. Right neck central line  tip at the SVC. Persistent opacity at the left lung base that may be  atelectasis or airspace disease. Small left pleural fluid appears  slightly more prominent. Right lung is clear. Patient is rotated.  Stable cardiac silhouette.    JO ANN RAMOS MD   XR Abdomen Port 1 View    Narrative    XR PORTABLE ABDOMEN ONE VIEW   1/1/2020 2:40 PM     HISTORY: Nasogastric tube placement.    COMPARISON: None.      Impression    IMPRESSION: Nasogastric tube tip in the expected position of the  stomach. Dilated small bowel loops identified. Left lung base opacity  or atelectasis again noted. Small left base pleural fluid.    JO ANN RAMOS MD   XR Chest Port 1 View    Narrative    CHEST ONE VIEW PORTABLE   1/2/2020 6:40 AM     HISTORY: Mechanical ventilation.    COMPARISON: 1/1/2020.      Impression    IMPRESSION: Endotracheal tube is not significantly changed, with tip  3.9 cm above the sheila. Right IJ central venous catheter, with tip in  the mid SVC. An enteric tube, tip below the diaphragm. Small left  pleural effusion has a similar appearance to the previous exam, given  differences in technique. Heart size appears stable. Pulmonary  vascularity is within  normal limits.    CLEMENT GARCIA MD   CTV Abdomen Pelvis w Contrast    Narrative    CTV ABDOMEN PELVIS WITH CONTRAST 1/2/2020 5:20 PM     HISTORY: Recurrent gastrointestinal bleed, nonbleeding ulcers noted on  endoscopy but stenotic and can't visualize endoscopically in small  bowel, looking for source of continued bleed.    TECHNIQUE: Initial noncontrast axial images through the abdomen and  pelvis are performed. Axial images from the lung bases to the  symphysis are then performed during arterial phase and portal venous  phase of the contrast bolus. Additional coronal reformatted images are  generated. 80 mL of Isovue 370 are given intravenously. Radiation dose  for this scan was reduced using automated exposure control, adjustment  of the mA and/or kV according to patient size, or iterative  reconstruction technique.    FINDINGS:   Moderate left pleural effusion and small right pleural effusion are  noted.    Abdomen: Initial noncontrast images demonstrates residual oral  contrast within the right and left colon along with the sigmoid colon.  Additional high attenuation material is noted in the region of the  transverse duodenum on series 14, image 32 which may be embolization  coils. Probable hyperdense left renal cortical cyst measures less than  1 cm on series 14, image 28. Small amount of sludge is noted in the  gallbladder. No enlarged lymph nodes. Extensive body wall edema is  present. Moderate ascites is present.    Following contrast administration, there is a normal-appearing liver,  spleen, pancreas and adrenal glands. There is patchy arterial  enhancement of each kidney which on portal venous phase images becomes  more homogeneous. This may be related to patient's impaired renal  function. Probable cyst lower pole left kidney on image 28 of series  13 measures nearly 1 cm. No hydronephrosis or obvious renal calculi.  No enlarged lymph nodes. Prior cholecystectomy.    Nasogastric tube terminates in  the gastric lumen. Arterial phase  images demonstrate enhancement of the gastric mucosa. No evidence of  contrast extravasation into the lumen of the duodenum. Colon is  obscured by previously existing oral contrast. Tiny amount of high  attenuation material is noted in the terminal ileum, not significantly  changed compared to precontrast images. This is likely residual oral  contrast.    Pelvis: Bladder is decompressed with a Swain catheter. The uterus and  rectum are unremarkable. No enlarged pelvic lymph nodes. A large  amount of free pelvic fluid is present. Bone window examination is  unremarkable.      Impression    IMPRESSION:  1. No definite evidence of IV contrast extravasation into the lumen of  the duodenum or proximal to mid small bowel. Previously administered  oral contrast precludes evaluation of the colon and probable terminal  ileum. Gastric mucosa enhances normally. No obvious IV contrast  accumulation in the gastric lumen.  2. Evidence of prior embolization procedure around the area of the  pancreatic head and duodenum.  3. Questionable hyperdense cyst measuring less than 1 cm lower pole  left kidney not well appreciated on postcontrast images. Additional  left renal cortical cyst is noted.    ELIJAH ARMENDARIZ MD   XR Chest Port 1 View    Narrative    XR CHEST PORT 1 VW   1/4/2020 4:00 AM     HISTORY: reps failure day #15 intubation    COMPARISON: 12/2/2020      Impression    IMPRESSION: Endotracheal and transesophageal gastric tubes are in  similar position. Right IJ CVC is in similar position. Stable small  left layering pleural effusion. Development of hazy opacities in the  right lower lobe may represent a small layering pleural effusion  and/or superimposed infectious/inflammatory pneumonitis. Interval  increase in consolidation hazy opacities in the left lung base  concerning for progression of multifocal infectious/inflammatory  pneumonitis with aspiration not excluded. There is a right  lateral  longitudinally oriented thin density is consistent with a skinfold  artifact.    ASIM TELLEZ MD   Echocardiogram Limited    Narrative    [id number]  [id number]  [id number]  [id number]^MAMI^HENRY^JULES           [facility]  Echocardiography Laboratory  [address] [place] [address]        Name: OLYA BEJARANO  MRN: [id number]  : 1960  Study Date: 2019 01:24 PM  Age: 59 yrs  Gender: Female  Patient Location: [facility]  Reason For Study: Other, Please Specify in Comments  Ordering Physician: HENRY BOLAÑOS  Performed By: Malvin Amato     BSA: 1.6 m2  Height: 68 in  Weight: 112 lb  HR: 105  BP: 101/60 mmHg  _____________________________________________________________________________  __        Procedure  Limited Portable Echo Adult.  _____________________________________________________________________________  __        Interpretation Summary     1. Left ventricular systolic function is normal. The visual ejection fraction  is estimated at 60-65%.  2. No regional wall motion abnormalities noted.  3. The right ventricle is normal in structure, function and size.  4. There is moderate (2+) aortic regurgitation.  5. Left pleural effusion is present.  6. No prior studies available for comparison.  _____________________________________________________________________________  __        Left Ventricle  The left ventricle is normal in size. There is normal left ventricular wall  thickness. Left ventricular systolic function is normal. The visual ejection  fraction is estimated at 60-65%. Left ventricular diastolic function is  indeterminate. No regional wall motion abnormalities noted.     Right Ventricle  The right ventricle is normal in structure, function and size.     Atria  The left atrium is mildly dilated. Right atrial size is normal.     Mitral Valve  There is mild (1+) mitral regurgitation.        Tricuspid Valve  The tricuspid valve is normal in structure and  function.     Aortic Valve  The aortic valve is trileaflet with aortic valve sclerosis. There is moderate  (2+) aortic regurgitation.     Pulmonic Valve  The pulmonic valve is not well seen, but is grossly normal.     Vessels  Normal size aorta. IVC diameter <2.1 cm collapsing >50% with sniff suggests a  normal RA pressure of 3 mmHg.     Pericardium  There is no pericardial effusion. Left pleural effusion is present.     _____________________________________________________________________________  __  MMode/2D Measurements & Calculations  IVSd: 0.78 cm  LVIDd: 4.0 cm  LVIDs: 2.2 cm  LVPWd: 0.76 cm  FS: 44.3 %     LV mass(C)d: 88.6 grams  LV mass(C)dI: 55.4 grams/m2  RWT: 0.38        Doppler Measurements & Calculations  AI P1/2t: 247.2 msec  TR max larisa: 285.1 cm/sec  TR max P.5 mmHg           _____________________________________________________________________________  __           Report approved by: Oswaldo Wise 2019 05:41 PM

## 2020-01-09 NOTE — PLAN OF CARE
DATE & TIME: 1/8/2020 1900-0730                     Cognitive Concerns/ Orientation : KRYS, Cymraes speaking, garbled speech.  scheduled for today 0900.  BEHAVIOR & AGGRESSION TOOL COLOR: Green  CIWA SCORE: NA  ABNL VS/O2: Resp 16-22, labored breathing with accessory muscle use. Comfort measures only.   MOBILITY: Bedrest, turn/repo for comfort.   PAIN MANAGMENT: Morphine 5mg given x1 for grimacing/moans with movement and increased respirations  DIET: NPO  BOWEL/BLADDER: Swain catheter, minimal UOP, no BM this shift  ABNL LAB/BG: NA  DRAIN/DEVICES:  Swain catheter, R PICC patent. NGT clamped, q4 60ml free water flushes.  TELEMETRY RHYTHM: NA  SKIN: Swelling and redness to nataly area, miconazole powder applied. +2-3 generalized edema.   TESTS/PROCEDURES: N/a  D/C DAY/GOALS/PLACE: Discharge pending placement, SW following.   OTHER IMPORTANT INFO: Limbs contracted. Frequent oral care completed.

## 2020-01-09 NOTE — PLAN OF CARE
Comfort cares. At 0900, respirations 20, even, non labored. At 0943, during bed bath pt passed away peacefully in her sleep; MD was notified, time of death called at 0943. Family was notified, son Caroline visited pt and gathered belongings EXCEPT earrings which remain on the patient. PICC, hagan, and NG were removed after family decided no autopsy was necessary.

## 2020-01-09 NOTE — PROGRESS NOTES
"D: SW following for discharge planning.   I: Pt has been declined by [facility] home due to being to \"complex.\" The business office is meeting with pt's family this afternoon in an attempt to change her [facility] [place] due to [place] change. If able, [place] would pay for SNF, [facility] does not.   P: SW following.     HALI Collins, LGSW  [phone]  [facility]    "

## 2020-01-09 NOTE — DEATH PRONOUNCEMENT
MD DEATH PRONOUNCEMENT    Called to pronounce Eliza Thurman dead.    Physical Exam: Unresponsive to noxious stimuli, Spontaneous respirations absent, Breath sounds absent and Heart sounds absent    Patient was pronounced dead at 09:43 AM, 2020.    Preliminary Cause of Death: Hemorrhagic shock due to bleeding duodenal ulcer    Active Problems:    Hemorrhagic shock (H)    Severe malnutrition (H)     Infectious disease present?: YES, pneumonia    Communicable disease present? (examples: HIV, chicken pox, TB, Ebola, CJD) :  NO    Multi-drug resistant organism present? (example: MRSA): YES, ESBL    Please consider an autopsy if any of the following exist:  NO Unexpected or unexplained death during or following any dental, medical, or surgical diagnostic treatment procedures.   NO Death of mother at or up to seven days after delivery.     NO All  and pediatric deaths.     NO Death where the cause is sufficiently obscure to delay completion of the death certificate.   NO Deaths in which autopsy would confirm a suspected illness/condition that would affect surviving family members or recipients of transplanted organs.     The following deaths must be reported to the 's Office:  NO A death that may be due entirely or in part to any factors other than natural disease (recent surgery, recent trauma, suspected abuse/neglect).   NO A death that may be an accident, suicide, or homicide.     NO Any sudden, unexpected death in which there is no prior history of significant heart disease or any other condition associated with sudden death.   NO A death under suspicious, unusual, or unexpected circumstances.    NO Any death which is apparently due to natural causes but in which the  does not have a personal physician familiar with the patient s medical history, social, or environmental situation or the circumstances of the terminal event.   NO Any death apparently due to Sudden Infant Death  Syndrome.     NO Deaths that occur during, in association with, or as consequences of a diagnostic, therapeutic, or anesthetic procedure.   NO Any death in which a fracture of a major bone has occurred within the past (6) six months.   NO A death of persons note seen by their physician within 120 days of demise.     NO Any death in which the  was an inmate of a public institution or was in the custody of Law Enforcement personnel.   NO  All unexpected deaths of children   NO Solid organ donors   NO Unidentified bodies   NO Deaths of persons whose bodies are to be cremated or otherwise disposed of so that the bodies will later be unavailable for examination;   NO Deaths unattended by a physician outside of a licensed healthcare facility or licensed residential hospice program   NO Deaths occurring within 24 hours of arrival to a health care facility if death is unexpected.    NO Deaths associated with the decedent s employment.   NO Deaths attributed to acts of terrorism.   NO Any death in which there is uncertainty as to whether it is a medical examiner s care should be discussed with the medical investigator.      Body disposition: Body released to the [facility].    Darinel Vasquez MD  20  10:08 AM